# Patient Record
Sex: MALE | Race: BLACK OR AFRICAN AMERICAN | Employment: OTHER | URBAN - METROPOLITAN AREA
[De-identification: names, ages, dates, MRNs, and addresses within clinical notes are randomized per-mention and may not be internally consistent; named-entity substitution may affect disease eponyms.]

---

## 2017-01-03 ENCOUNTER — HOSPITAL ENCOUNTER (OUTPATIENT)
Dept: OTHER | Age: 52
Discharge: HOME | End: 2017-01-03
Attending: NURSE PRACTITIONER | Admitting: NURSE PRACTITIONER

## 2017-01-03 ENCOUNTER — OFFICE VISIT (OUTPATIENT)
Dept: FAMILY MEDICINE CLINIC | Age: 52
End: 2017-01-03

## 2017-01-03 VITALS
TEMPERATURE: 98.1 F | SYSTOLIC BLOOD PRESSURE: 120 MMHG | WEIGHT: 315 LBS | OXYGEN SATURATION: 96 % | HEART RATE: 98 BPM | BODY MASS INDEX: 49.44 KG/M2 | DIASTOLIC BLOOD PRESSURE: 82 MMHG | HEIGHT: 67 IN

## 2017-01-03 DIAGNOSIS — M79.89 FOOT SWELLING: Primary | ICD-10-CM

## 2017-01-03 DIAGNOSIS — M79.89 FOOT SWELLING: ICD-10-CM

## 2017-01-03 LAB
ALBUMIN SERPL-MCNC: 4.5 G/DL (ref 3.4–5)
ANION GAP SERPL CALCULATED.3IONS-SCNC: 15 MMOL/L (ref 3–16)
BASOPHILS ABSOLUTE: 0 K/UL (ref 0–0.2)
BASOPHILS RELATIVE PERCENT: 0.5 %
BUN BLDV-MCNC: 14 MG/DL (ref 7–20)
CALCIUM SERPL-MCNC: 9.5 MG/DL (ref 8.3–10.6)
CHLORIDE BLD-SCNC: 103 MMOL/L (ref 99–110)
CO2: 25 MMOL/L (ref 21–32)
CREAT SERPL-MCNC: 1.2 MG/DL (ref 0.9–1.3)
EOSINOPHILS ABSOLUTE: 0.1 K/UL (ref 0–0.6)
EOSINOPHILS RELATIVE PERCENT: 1.8 %
GFR AFRICAN AMERICAN: >60
GFR NON-AFRICAN AMERICAN: >60
GLUCOSE BLD-MCNC: 126 MG/DL (ref 70–99)
HCT VFR BLD CALC: 43.5 % (ref 40.5–52.5)
HEMOGLOBIN: 14.1 G/DL (ref 13.5–17.5)
LYMPHOCYTES ABSOLUTE: 1.9 K/UL (ref 1–5.1)
LYMPHOCYTES RELATIVE PERCENT: 28.3 %
MCH RBC QN AUTO: 28.9 PG (ref 26–34)
MCHC RBC AUTO-ENTMCNC: 32.5 G/DL (ref 31–36)
MCV RBC AUTO: 89.2 FL (ref 80–100)
MONOCYTES ABSOLUTE: 0.4 K/UL (ref 0–1.3)
MONOCYTES RELATIVE PERCENT: 6.1 %
NEUTROPHILS ABSOLUTE: 4.2 K/UL (ref 1.7–7.7)
NEUTROPHILS RELATIVE PERCENT: 63.3 %
PDW BLD-RTO: 15.3 % (ref 12.4–15.4)
PHOSPHORUS: 3.6 MG/DL (ref 2.5–4.9)
PLATELET # BLD: 293 K/UL (ref 135–450)
PMV BLD AUTO: 8 FL (ref 5–10.5)
POTASSIUM SERPL-SCNC: 5.1 MMOL/L (ref 3.5–5.1)
RBC # BLD: 4.88 M/UL (ref 4.2–5.9)
SODIUM BLD-SCNC: 143 MMOL/L (ref 136–145)
URIC ACID, SERUM: 9.5 MG/DL (ref 3.5–7.2)
WBC # BLD: 6.7 K/UL (ref 4–11)

## 2017-01-03 PROCEDURE — 99213 OFFICE O/P EST LOW 20 MIN: CPT | Performed by: NURSE PRACTITIONER

## 2017-01-03 RX ORDER — INDOMETHACIN 50 MG/1
50 CAPSULE ORAL 3 TIMES DAILY
Qty: 30 CAPSULE | Refills: 0 | Status: SHIPPED | OUTPATIENT
Start: 2017-01-03 | End: 2017-04-07 | Stop reason: SDUPTHER

## 2017-01-03 ASSESSMENT — ENCOUNTER SYMPTOMS
NAUSEA: 0
SORE THROAT: 0
ALLERGIC/IMMUNOLOGIC NEGATIVE: 1
VISUAL CHANGE: 0
CHANGE IN BOWEL HABIT: 0
SWOLLEN GLANDS: 0
VOMITING: 0
GASTROINTESTINAL NEGATIVE: 1
COUGH: 0
ABDOMINAL PAIN: 0
RESPIRATORY NEGATIVE: 1
EYES NEGATIVE: 1

## 2017-01-04 LAB — SEDIMENTATION RATE, ERYTHROCYTE: 18 MM/HR (ref 0–20)

## 2017-04-07 ENCOUNTER — OFFICE VISIT (OUTPATIENT)
Dept: FAMILY MEDICINE CLINIC | Age: 52
End: 2017-04-07

## 2017-04-07 ENCOUNTER — HOSPITAL ENCOUNTER (OUTPATIENT)
Dept: OTHER | Age: 52
Discharge: HOME | End: 2017-04-07
Attending: FAMILY MEDICINE | Admitting: FAMILY MEDICINE

## 2017-04-07 VITALS
TEMPERATURE: 98 F | SYSTOLIC BLOOD PRESSURE: 130 MMHG | BODY MASS INDEX: 49.44 KG/M2 | HEART RATE: 106 BPM | DIASTOLIC BLOOD PRESSURE: 86 MMHG | WEIGHT: 315 LBS | HEIGHT: 67 IN | OXYGEN SATURATION: 96 %

## 2017-04-07 DIAGNOSIS — R35.0 URINATION FREQUENCY: ICD-10-CM

## 2017-04-07 DIAGNOSIS — M79.89 FOOT SWELLING: ICD-10-CM

## 2017-04-07 DIAGNOSIS — Z11.4 SCREENING FOR HIV (HUMAN IMMUNODEFICIENCY VIRUS): ICD-10-CM

## 2017-04-07 DIAGNOSIS — E78.2 MIXED HYPERLIPIDEMIA: Chronic | ICD-10-CM

## 2017-04-07 DIAGNOSIS — Z11.59 NEED FOR HEPATITIS C SCREENING TEST: ICD-10-CM

## 2017-04-07 DIAGNOSIS — M10.00 IDIOPATHIC GOUT, UNSPECIFIED CHRONICITY, UNSPECIFIED SITE: ICD-10-CM

## 2017-04-07 DIAGNOSIS — M17.0 PRIMARY OSTEOARTHRITIS OF BOTH KNEES: ICD-10-CM

## 2017-04-07 DIAGNOSIS — R73.9 HYPERGLYCEMIA: ICD-10-CM

## 2017-04-07 DIAGNOSIS — Z12.5 SCREENING PSA (PROSTATE SPECIFIC ANTIGEN): ICD-10-CM

## 2017-04-07 DIAGNOSIS — E55.9 VITAMIN D DEFICIENCY: ICD-10-CM

## 2017-04-07 DIAGNOSIS — M17.0 PRIMARY OSTEOARTHRITIS OF BOTH KNEES: Primary | ICD-10-CM

## 2017-04-07 LAB
BILIRUBIN, POC: NORMAL
BLOOD URINE, POC: NORMAL
CLARITY, POC: CLEAR
COLOR, POC: YELLOW
GLUCOSE URINE, POC: NORMAL
KETONES, POC: NORMAL
LEUKOCYTE EST, POC: NORMAL
NITRITE, POC: NORMAL
PH, POC: 8
PROTEIN, POC: NORMAL
SPECIFIC GRAVITY, POC: 1.01
UROBILINOGEN, POC: 0.2

## 2017-04-07 PROCEDURE — 81002 URINALYSIS NONAUTO W/O SCOPE: CPT | Performed by: FAMILY MEDICINE

## 2017-04-07 PROCEDURE — 99214 OFFICE O/P EST MOD 30 MIN: CPT | Performed by: FAMILY MEDICINE

## 2017-04-07 RX ORDER — ALLOPURINOL 300 MG/1
300 TABLET ORAL DAILY
Qty: 90 TABLET | Refills: 1 | Status: SHIPPED | OUTPATIENT
Start: 2017-04-07 | End: 2018-06-28

## 2017-04-07 RX ORDER — ATORVASTATIN CALCIUM 10 MG/1
10 TABLET, FILM COATED ORAL NIGHTLY
Qty: 30 TABLET | Refills: 3 | Status: SHIPPED | OUTPATIENT
Start: 2017-04-07 | End: 2018-03-28

## 2017-04-07 RX ORDER — INDOMETHACIN 50 MG/1
50 CAPSULE ORAL 3 TIMES DAILY
Qty: 30 CAPSULE | Refills: 0 | Status: SHIPPED | OUTPATIENT
Start: 2017-04-07 | End: 2017-05-23 | Stop reason: ALTCHOICE

## 2017-04-08 DIAGNOSIS — Z11.59 NEED FOR HEPATITIS C SCREENING TEST: ICD-10-CM

## 2017-04-08 DIAGNOSIS — R73.9 HYPERGLYCEMIA: ICD-10-CM

## 2017-04-08 DIAGNOSIS — Z12.5 SCREENING PSA (PROSTATE SPECIFIC ANTIGEN): ICD-10-CM

## 2017-04-08 DIAGNOSIS — Z11.4 SCREENING FOR HIV (HUMAN IMMUNODEFICIENCY VIRUS): ICD-10-CM

## 2017-04-08 DIAGNOSIS — E78.2 MIXED HYPERLIPIDEMIA: Chronic | ICD-10-CM

## 2017-04-08 DIAGNOSIS — E55.9 VITAMIN D DEFICIENCY: ICD-10-CM

## 2017-04-08 DIAGNOSIS — M10.00 IDIOPATHIC GOUT, UNSPECIFIED CHRONICITY, UNSPECIFIED SITE: ICD-10-CM

## 2017-04-08 LAB
A/G RATIO: 1.7 (ref 1.1–2.2)
ALBUMIN SERPL-MCNC: 4.5 G/DL (ref 3.4–5)
ALP BLD-CCNC: 82 U/L (ref 40–129)
ALT SERPL-CCNC: 27 U/L (ref 10–40)
ANION GAP SERPL CALCULATED.3IONS-SCNC: 13 MMOL/L (ref 3–16)
AST SERPL-CCNC: 22 U/L (ref 15–37)
BILIRUB SERPL-MCNC: 0.5 MG/DL (ref 0–1)
BUN BLDV-MCNC: 12 MG/DL (ref 7–20)
CALCIUM SERPL-MCNC: 9.8 MG/DL (ref 8.3–10.6)
CHLORIDE BLD-SCNC: 105 MMOL/L (ref 99–110)
CHOLESTEROL, TOTAL: 250 MG/DL (ref 0–199)
CO2: 26 MMOL/L (ref 21–32)
CREAT SERPL-MCNC: 1.2 MG/DL (ref 0.9–1.3)
GFR AFRICAN AMERICAN: >60
GFR NON-AFRICAN AMERICAN: >60
GLOBULIN: 2.7 G/DL
GLUCOSE BLD-MCNC: 129 MG/DL (ref 70–99)
HDLC SERPL-MCNC: 49 MG/DL (ref 40–60)
HEPATITIS C ANTIBODY INTERPRETATION: NORMAL
LDL CHOLESTEROL CALCULATED: 175 MG/DL
POTASSIUM SERPL-SCNC: 4.6 MMOL/L (ref 3.5–5.1)
PROSTATE SPECIFIC ANTIGEN: 0.4 NG/ML (ref 0–4)
SODIUM BLD-SCNC: 144 MMOL/L (ref 136–145)
TOTAL PROTEIN: 7.2 G/DL (ref 6.4–8.2)
TRIGL SERPL-MCNC: 129 MG/DL (ref 0–150)
URIC ACID, SERUM: 7.9 MG/DL (ref 3.5–7.2)
VITAMIN D 25-HYDROXY: 20.3 NG/ML
VLDLC SERPL CALC-MCNC: 26 MG/DL

## 2017-04-09 LAB
ESTIMATED AVERAGE GLUCOSE: 142.7 MG/DL
HBA1C MFR BLD: 6.6 %

## 2017-04-09 ASSESSMENT — ENCOUNTER SYMPTOMS
GASTROINTESTINAL NEGATIVE: 1
RESPIRATORY NEGATIVE: 1
EYES NEGATIVE: 1

## 2017-04-10 LAB — HIV-1 AND HIV-2 ANTIBODIES: NORMAL

## 2017-04-11 DIAGNOSIS — E55.9 VITAMIN D DEFICIENCY: ICD-10-CM

## 2017-04-11 DIAGNOSIS — E11.9 CONTROLLED TYPE 2 DIABETES MELLITUS WITHOUT COMPLICATION, WITHOUT LONG-TERM CURRENT USE OF INSULIN (HCC): Primary | ICD-10-CM

## 2017-05-16 ENCOUNTER — TELEPHONE (OUTPATIENT)
Dept: FAMILY MEDICINE CLINIC | Age: 52
End: 2017-05-16

## 2017-05-16 DIAGNOSIS — M17.0 PRIMARY OSTEOARTHRITIS OF BOTH KNEES: Primary | ICD-10-CM

## 2017-05-23 ENCOUNTER — TELEPHONE (OUTPATIENT)
Dept: FAMILY MEDICINE CLINIC | Age: 52
End: 2017-05-23

## 2017-05-23 DIAGNOSIS — M17.0 PRIMARY OSTEOARTHRITIS OF BOTH KNEES: ICD-10-CM

## 2017-05-23 RX ORDER — DICLOFENAC SODIUM 75 MG/1
75 TABLET, DELAYED RELEASE ORAL 2 TIMES DAILY
Qty: 60 TABLET | Refills: 3 | Status: SHIPPED | OUTPATIENT
Start: 2017-05-23 | End: 2018-07-19

## 2017-05-24 ENCOUNTER — OFFICE VISIT (OUTPATIENT)
Dept: FAMILY MEDICINE CLINIC | Age: 52
End: 2017-05-24

## 2017-05-24 VITALS
HEIGHT: 68 IN | OXYGEN SATURATION: 95 % | BODY MASS INDEX: 47.74 KG/M2 | HEART RATE: 97 BPM | DIASTOLIC BLOOD PRESSURE: 90 MMHG | WEIGHT: 315 LBS | SYSTOLIC BLOOD PRESSURE: 124 MMHG | TEMPERATURE: 98.6 F

## 2017-05-24 DIAGNOSIS — E11.9 CONTROLLED TYPE 2 DIABETES MELLITUS WITHOUT COMPLICATION, WITHOUT LONG-TERM CURRENT USE OF INSULIN (HCC): Primary | ICD-10-CM

## 2017-05-24 DIAGNOSIS — E55.9 VITAMIN D DEFICIENCY: ICD-10-CM

## 2017-05-24 DIAGNOSIS — E78.2 MIXED HYPERLIPIDEMIA: Chronic | ICD-10-CM

## 2017-05-24 DIAGNOSIS — E11.9 CONTROLLED TYPE 2 DIABETES MELLITUS WITHOUT COMPLICATION, WITHOUT LONG-TERM CURRENT USE OF INSULIN (HCC): ICD-10-CM

## 2017-05-24 LAB
ALBUMIN SERPL-MCNC: 4.4 G/DL (ref 3.4–5)
ANION GAP SERPL CALCULATED.3IONS-SCNC: 17 MMOL/L (ref 3–16)
BILIRUBIN, POC: NORMAL
BLOOD URINE, POC: NORMAL
BUN BLDV-MCNC: 13 MG/DL (ref 7–20)
CALCIUM SERPL-MCNC: 9.7 MG/DL (ref 8.3–10.6)
CHLORIDE BLD-SCNC: 102 MMOL/L (ref 99–110)
CHOLESTEROL, TOTAL: 198 MG/DL (ref 0–199)
CLARITY, POC: CLEAR
CO2: 21 MMOL/L (ref 21–32)
COLOR, POC: YELLOW
CREAT SERPL-MCNC: 1 MG/DL (ref 0.9–1.3)
CREATININE URINE POCT: NORMAL
GFR AFRICAN AMERICAN: >60
GFR NON-AFRICAN AMERICAN: >60
GLUCOSE BLD-MCNC: 88 MG/DL (ref 70–99)
GLUCOSE URINE, POC: NORMAL
HDLC SERPL-MCNC: 52 MG/DL (ref 40–60)
KETONES, POC: NORMAL
LDL CHOLESTEROL CALCULATED: 123 MG/DL
LEUKOCYTE EST, POC: NORMAL
MICROALBUMIN/CREAT 24H UR: NORMAL MG/G{CREAT}
MICROALBUMIN/CREAT UR-RTO: NORMAL
NITRITE, POC: NORMAL
PH, POC: 7
PHOSPHORUS: 3.2 MG/DL (ref 2.5–4.9)
POTASSIUM SERPL-SCNC: 5.1 MMOL/L (ref 3.5–5.1)
PROTEIN, POC: NORMAL
SODIUM BLD-SCNC: 140 MMOL/L (ref 136–145)
SPECIFIC GRAVITY, POC: 1.01
TRIGL SERPL-MCNC: 115 MG/DL (ref 0–150)
UROBILINOGEN, POC: 0.2
VITAMIN D 25-HYDROXY: 31.9 NG/ML
VLDLC SERPL CALC-MCNC: 23 MG/DL

## 2017-05-24 PROCEDURE — 82044 UR ALBUMIN SEMIQUANTITATIVE: CPT | Performed by: NURSE PRACTITIONER

## 2017-05-24 PROCEDURE — 99214 OFFICE O/P EST MOD 30 MIN: CPT | Performed by: NURSE PRACTITIONER

## 2017-05-24 PROCEDURE — 81002 URINALYSIS NONAUTO W/O SCOPE: CPT | Performed by: NURSE PRACTITIONER

## 2017-05-24 ASSESSMENT — ENCOUNTER SYMPTOMS
SHORTNESS OF BREATH: 0
GASTROINTESTINAL NEGATIVE: 1
EYES NEGATIVE: 1
RESPIRATORY NEGATIVE: 1
ALLERGIC/IMMUNOLOGIC NEGATIVE: 1

## 2017-05-25 LAB
ESTIMATED AVERAGE GLUCOSE: 134.1 MG/DL
HBA1C MFR BLD: 6.3 %

## 2017-06-05 ENCOUNTER — HOSPITAL ENCOUNTER (OUTPATIENT)
Dept: PHYSICAL THERAPY | Facility: MEDICAL CENTER | Age: 52
Discharge: HOME | End: 2017-06-30
Admitting: FAMILY MEDICINE

## 2017-06-05 ENCOUNTER — TELEPHONE (OUTPATIENT)
Dept: FAMILY MEDICINE CLINIC | Age: 52
End: 2017-06-05

## 2017-06-05 DIAGNOSIS — E11.9 CONTROLLED TYPE 2 DIABETES MELLITUS WITHOUT COMPLICATION, WITHOUT LONG-TERM CURRENT USE OF INSULIN (HCC): Primary | ICD-10-CM

## 2017-06-28 ENCOUNTER — TELEPHONE (OUTPATIENT)
Dept: FAMILY MEDICINE CLINIC | Age: 52
End: 2017-06-28

## 2017-06-28 DIAGNOSIS — E11.9 CONTROLLED TYPE 2 DIABETES MELLITUS WITHOUT COMPLICATION, WITHOUT LONG-TERM CURRENT USE OF INSULIN (HCC): ICD-10-CM

## 2017-06-28 DIAGNOSIS — M17.0 PRIMARY OSTEOARTHRITIS OF BOTH KNEES: Primary | ICD-10-CM

## 2017-07-10 ENCOUNTER — OFFICE VISIT (OUTPATIENT)
Dept: ORTHOPEDIC SURGERY | Age: 52
End: 2017-07-10

## 2017-07-10 VITALS — WEIGHT: 315 LBS | BODY MASS INDEX: 47.74 KG/M2 | HEIGHT: 68 IN

## 2017-07-10 DIAGNOSIS — M25.461 EFFUSION OF RIGHT KNEE: ICD-10-CM

## 2017-07-10 DIAGNOSIS — M25.561 RIGHT KNEE PAIN, UNSPECIFIED CHRONICITY: Primary | ICD-10-CM

## 2017-07-10 PROCEDURE — 99243 OFF/OP CNSLTJ NEW/EST LOW 30: CPT | Performed by: ORTHOPAEDIC SURGERY

## 2017-07-10 PROCEDURE — 73564 X-RAY EXAM KNEE 4 OR MORE: CPT | Performed by: ORTHOPAEDIC SURGERY

## 2017-07-10 PROCEDURE — 20610 DRAIN/INJ JOINT/BURSA W/O US: CPT | Performed by: ORTHOPAEDIC SURGERY

## 2017-08-15 ENCOUNTER — OFFICE VISIT (OUTPATIENT)
Dept: ORTHOPEDIC SURGERY | Age: 52
End: 2017-08-15

## 2017-08-15 VITALS — BODY MASS INDEX: 46.65 KG/M2 | HEIGHT: 69 IN | WEIGHT: 315 LBS

## 2017-08-15 DIAGNOSIS — S83.231D COMPLEX TEAR OF MEDIAL MENISCUS OF RIGHT KNEE AS CURRENT INJURY, SUBSEQUENT ENCOUNTER: Primary | ICD-10-CM

## 2017-08-15 PROCEDURE — 99213 OFFICE O/P EST LOW 20 MIN: CPT | Performed by: ORTHOPAEDIC SURGERY

## 2017-08-25 ENCOUNTER — OFFICE VISIT (OUTPATIENT)
Dept: ORTHOPEDIC SURGERY | Age: 52
End: 2017-08-25

## 2017-08-25 VITALS — WEIGHT: 315 LBS | HEIGHT: 69 IN | BODY MASS INDEX: 46.65 KG/M2

## 2017-08-25 DIAGNOSIS — S83.271D COMPLEX TEAR OF LATERAL MENISCUS OF RIGHT KNEE AS CURRENT INJURY, SUBSEQUENT ENCOUNTER: ICD-10-CM

## 2017-08-25 DIAGNOSIS — S83.231D COMPLEX TEAR OF MEDIAL MENISCUS OF RIGHT KNEE AS CURRENT INJURY, SUBSEQUENT ENCOUNTER: Primary | ICD-10-CM

## 2017-08-25 PROBLEM — S83.271A COMPLEX TEAR OF LATERAL MENISCUS OF RIGHT KNEE AS CURRENT INJURY: Status: ACTIVE | Noted: 2017-08-25

## 2017-08-25 PROBLEM — S83.231A COMPLEX TEAR OF MEDIAL MENISCUS OF RIGHT KNEE AS CURRENT INJURY: Status: ACTIVE | Noted: 2017-08-25

## 2017-08-25 PROCEDURE — 99213 OFFICE O/P EST LOW 20 MIN: CPT | Performed by: ORTHOPAEDIC SURGERY

## 2017-08-28 ENCOUNTER — TELEPHONE (OUTPATIENT)
Dept: ORTHOPEDIC SURGERY | Age: 52
End: 2017-08-28

## 2017-09-01 ENCOUNTER — TELEPHONE (OUTPATIENT)
Dept: ORTHOPEDIC SURGERY | Age: 52
End: 2017-09-01

## 2017-09-01 ENCOUNTER — OFFICE VISIT (OUTPATIENT)
Dept: FAMILY MEDICINE CLINIC | Age: 52
End: 2017-09-01

## 2017-09-01 VITALS
HEART RATE: 105 BPM | BODY MASS INDEX: 46.65 KG/M2 | HEIGHT: 69 IN | SYSTOLIC BLOOD PRESSURE: 126 MMHG | OXYGEN SATURATION: 98 % | TEMPERATURE: 98.1 F | WEIGHT: 315 LBS | DIASTOLIC BLOOD PRESSURE: 86 MMHG

## 2017-09-01 DIAGNOSIS — S83.271S COMPLEX TEAR OF LATERAL MENISCUS OF RIGHT KNEE AS CURRENT INJURY, SEQUELA: ICD-10-CM

## 2017-09-01 DIAGNOSIS — G47.33 OBSTRUCTIVE SLEEP APNEA SYNDROME: Chronic | ICD-10-CM

## 2017-09-01 DIAGNOSIS — Z20.2 EXPOSURE TO TRICHOMONAS: ICD-10-CM

## 2017-09-01 DIAGNOSIS — M10.00 IDIOPATHIC GOUT, UNSPECIFIED CHRONICITY, UNSPECIFIED SITE: ICD-10-CM

## 2017-09-01 DIAGNOSIS — E66.01 MORBID OBESITY WITH BMI OF 45.0-49.9, ADULT (HCC): ICD-10-CM

## 2017-09-01 DIAGNOSIS — N18.2 CHRONIC KIDNEY DISEASE (CKD), STAGE II (MILD): Chronic | ICD-10-CM

## 2017-09-01 DIAGNOSIS — M17.0 PRIMARY OSTEOARTHRITIS OF BOTH KNEES: ICD-10-CM

## 2017-09-01 DIAGNOSIS — E78.2 MIXED HYPERLIPIDEMIA: Chronic | ICD-10-CM

## 2017-09-01 DIAGNOSIS — E11.9 CONTROLLED TYPE 2 DIABETES MELLITUS WITHOUT COMPLICATION, WITHOUT LONG-TERM CURRENT USE OF INSULIN (HCC): ICD-10-CM

## 2017-09-01 DIAGNOSIS — S83.231S COMPLEX TEAR OF MEDIAL MENISCUS OF RIGHT KNEE AS CURRENT INJURY, SEQUELA: ICD-10-CM

## 2017-09-01 DIAGNOSIS — Z01.818 PREOP EXAMINATION: Primary | ICD-10-CM

## 2017-09-01 PROCEDURE — 93000 ELECTROCARDIOGRAM COMPLETE: CPT | Performed by: NURSE PRACTITIONER

## 2017-09-01 PROCEDURE — 99244 OFF/OP CNSLTJ NEW/EST MOD 40: CPT | Performed by: NURSE PRACTITIONER

## 2017-09-04 LAB
APTIMA MEDIA TYPE: NORMAL
SPECIMEN SOURCE: NORMAL
T. VAGINALIS AMPLIFIED: NEGATIVE

## 2017-09-14 ENCOUNTER — HOSPITAL ENCOUNTER (OUTPATIENT)
Dept: SURGERY | Age: 52
Discharge: HOME | End: 2017-09-14
Attending: ORTHOPAEDIC SURGERY | Admitting: ORTHOPAEDIC SURGERY

## 2017-09-14 VITALS
WEIGHT: 315 LBS | BODY MASS INDEX: 47.74 KG/M2 | SYSTOLIC BLOOD PRESSURE: 119 MMHG | HEART RATE: 91 BPM | DIASTOLIC BLOOD PRESSURE: 91 MMHG | HEIGHT: 68 IN | OXYGEN SATURATION: 98 % | RESPIRATION RATE: 16 BRPM | TEMPERATURE: 97 F

## 2017-09-14 LAB
GLUCOSE BLD-MCNC: 127 MG/DL (ref 70–99)
GLUCOSE BLD-MCNC: 90 MG/DL (ref 70–99)
HCT VFR BLD CALC: 45.5 % (ref 40.5–52.5)
HEMOGLOBIN: 14.3 G/DL (ref 13.5–17.5)
MCH RBC QN AUTO: 28.4 PG (ref 26–34)
MCHC RBC AUTO-ENTMCNC: 31.4 G/DL (ref 31–36)
MCV RBC AUTO: 90.3 FL (ref 80–100)
PDW BLD-RTO: 15.1 % (ref 12.4–15.4)
PERFORMED ON: ABNORMAL
PERFORMED ON: NORMAL
PLATELET # BLD: 252 K/UL (ref 135–450)
PMV BLD AUTO: 8 FL (ref 5–10.5)
RBC # BLD: 5.04 M/UL (ref 4.2–5.9)
WBC # BLD: 7.5 K/UL (ref 4–11)

## 2017-09-14 RX ORDER — IBUPROFEN 800 MG/1
800 TABLET ORAL EVERY 6 HOURS PRN
Qty: 60 TABLET | Refills: 3 | Status: SHIPPED | OUTPATIENT
Start: 2017-09-14 | End: 2018-07-19

## 2017-09-14 RX ORDER — HYDROMORPHONE HCL 110MG/55ML
0.5 PATIENT CONTROLLED ANALGESIA SYRINGE INTRAVENOUS EVERY 5 MIN PRN
Status: DISCONTINUED | OUTPATIENT
Start: 2017-09-14 | End: 2017-09-15 | Stop reason: HOSPADM

## 2017-09-14 RX ORDER — SODIUM CHLORIDE, SODIUM LACTATE, POTASSIUM CHLORIDE, CALCIUM CHLORIDE 600; 310; 30; 20 MG/100ML; MG/100ML; MG/100ML; MG/100ML
INJECTION, SOLUTION INTRAVENOUS CONTINUOUS
Status: DISCONTINUED | OUTPATIENT
Start: 2017-09-14 | End: 2017-09-15 | Stop reason: HOSPADM

## 2017-09-14 RX ORDER — SODIUM CHLORIDE 9 MG/ML
INJECTION, SOLUTION INTRAVENOUS CONTINUOUS
Status: DISCONTINUED | OUTPATIENT
Start: 2017-09-14 | End: 2017-09-15 | Stop reason: HOSPADM

## 2017-09-14 RX ORDER — SODIUM CHLORIDE 0.9 % (FLUSH) 0.9 %
10 SYRINGE (ML) INJECTION PRN
Status: DISCONTINUED | OUTPATIENT
Start: 2017-09-14 | End: 2017-09-15 | Stop reason: HOSPADM

## 2017-09-14 RX ORDER — DIPHENHYDRAMINE HYDROCHLORIDE 50 MG/ML
25 INJECTION INTRAMUSCULAR; INTRAVENOUS PRN
Status: DISCONTINUED | OUTPATIENT
Start: 2017-09-14 | End: 2017-09-15 | Stop reason: HOSPADM

## 2017-09-14 RX ORDER — OXYCODONE HYDROCHLORIDE 5 MG/1
5 TABLET ORAL EVERY 4 HOURS PRN
Qty: 20 TABLET | Refills: 0 | Status: SHIPPED | OUTPATIENT
Start: 2017-09-14 | End: 2017-09-21

## 2017-09-14 RX ORDER — SODIUM CHLORIDE 0.9 % (FLUSH) 0.9 %
10 SYRINGE (ML) INJECTION EVERY 12 HOURS SCHEDULED
Status: DISCONTINUED | OUTPATIENT
Start: 2017-09-14 | End: 2017-09-15 | Stop reason: HOSPADM

## 2017-09-14 RX ORDER — OXYCODONE HYDROCHLORIDE 5 MG/1
TABLET ORAL
Status: DISPENSED
Start: 2017-09-14 | End: 2017-09-14

## 2017-09-14 RX ORDER — LIDOCAINE HYDROCHLORIDE 10 MG/ML
0.5 INJECTION, SOLUTION EPIDURAL; INFILTRATION; INTRACAUDAL; PERINEURAL ONCE
Status: DISCONTINUED | OUTPATIENT
Start: 2017-09-14 | End: 2017-09-15 | Stop reason: HOSPADM

## 2017-09-14 RX ORDER — MORPHINE SULFATE 2 MG/ML
1 INJECTION, SOLUTION INTRAMUSCULAR; INTRAVENOUS EVERY 5 MIN PRN
Status: DISCONTINUED | OUTPATIENT
Start: 2017-09-14 | End: 2017-09-15 | Stop reason: HOSPADM

## 2017-09-14 RX ORDER — FENTANYL CITRATE 50 UG/ML
25 INJECTION, SOLUTION INTRAMUSCULAR; INTRAVENOUS EVERY 5 MIN PRN
Status: DISCONTINUED | OUTPATIENT
Start: 2017-09-14 | End: 2017-09-15 | Stop reason: HOSPADM

## 2017-09-14 RX ORDER — OXYCODONE HYDROCHLORIDE 5 MG/1
5 TABLET ORAL
Status: COMPLETED | OUTPATIENT
Start: 2017-09-14 | End: 2017-09-14

## 2017-09-14 RX ORDER — ACETAMINOPHEN 500 MG
1000 TABLET ORAL EVERY 6 HOURS PRN
Qty: 60 TABLET | Refills: 3 | Status: SHIPPED | OUTPATIENT
Start: 2017-09-14 | End: 2018-07-12

## 2017-09-14 RX ORDER — ONDANSETRON 2 MG/ML
4 INJECTION INTRAMUSCULAR; INTRAVENOUS
Status: ACTIVE | OUTPATIENT
Start: 2017-09-14 | End: 2017-09-14

## 2017-09-14 RX ADMIN — FENTANYL CITRATE 25 MCG: 50 INJECTION, SOLUTION INTRAMUSCULAR; INTRAVENOUS at 08:11

## 2017-09-14 RX ADMIN — OXYCODONE HYDROCHLORIDE 5 MG: 5 TABLET ORAL at 08:35

## 2017-09-14 RX ADMIN — FENTANYL CITRATE 25 MCG: 50 INJECTION, SOLUTION INTRAMUSCULAR; INTRAVENOUS at 08:27

## 2017-09-14 RX ADMIN — SODIUM CHLORIDE: 9 INJECTION, SOLUTION INTRAVENOUS at 06:46

## 2017-09-14 ASSESSMENT — PAIN SCALES - GENERAL
PAINLEVEL_OUTOF10: 4
PAINLEVEL_OUTOF10: 8
PAINLEVEL_OUTOF10: 7

## 2017-09-14 ASSESSMENT — PAIN - FUNCTIONAL ASSESSMENT: PAIN_FUNCTIONAL_ASSESSMENT: 0-10

## 2017-09-14 ASSESSMENT — PAIN DESCRIPTION - DESCRIPTORS: DESCRIPTORS: ACHING

## 2017-09-18 DIAGNOSIS — S83.231D COMPLEX TEAR OF MEDIAL MENISCUS OF RIGHT KNEE AS CURRENT INJURY, SUBSEQUENT ENCOUNTER: Primary | ICD-10-CM

## 2017-09-18 DIAGNOSIS — M94.261 CHONDROMALACIA OF MEDIAL FEMORAL CONDYLE, RIGHT: ICD-10-CM

## 2017-09-18 DIAGNOSIS — M22.41 CHONDROMALACIA OF PATELLOFEMORAL JOINT, RIGHT: ICD-10-CM

## 2017-09-18 DIAGNOSIS — M67.261 SYNOVIAL HYPERTROPHY OF RIGHT KNEE: ICD-10-CM

## 2017-09-18 DIAGNOSIS — S83.271D COMPLEX TEAR OF LATERAL MENISCUS OF RIGHT KNEE AS CURRENT INJURY, SUBSEQUENT ENCOUNTER: ICD-10-CM

## 2017-09-19 ENCOUNTER — OFFICE VISIT (OUTPATIENT)
Dept: ORTHOPEDIC SURGERY | Age: 52
End: 2017-09-19

## 2017-09-19 DIAGNOSIS — M17.11 PRIMARY OSTEOARTHRITIS OF RIGHT KNEE: ICD-10-CM

## 2017-09-19 DIAGNOSIS — S83.231D COMPLEX TEAR OF MEDIAL MENISCUS OF RIGHT KNEE AS CURRENT INJURY, SUBSEQUENT ENCOUNTER: Primary | ICD-10-CM

## 2017-09-19 PROCEDURE — 99024 POSTOP FOLLOW-UP VISIT: CPT | Performed by: ORTHOPAEDIC SURGERY

## 2017-09-22 ENCOUNTER — HOSPITAL ENCOUNTER (OUTPATIENT)
Dept: PHYSICAL THERAPY | Age: 52
Discharge: HOME | End: 2017-09-30
Admitting: ORTHOPAEDIC SURGERY

## 2017-09-22 NOTE — FLOWSHEET NOTE
[facility]    Physical Therapy Daily Treatment Note  Date:  2017    Patient Name:  Ekaterina Rodriguez    :  1965  MRN: [id number]  Restrictions/Precautions:    Medical/Treatment Diagnosis Information:  · Diagnosis: S83.231D (ICD-10-CM) - Complex tear of medial meniscus of right knee, S83.271D (ICD-10-CM) - Complex tear of lateral meniscus of right knee  · Treatment Diagnosis: Right Knee Pain  Insurance/Certification information:  PT Insurance Information: [employer]  Physician Information:  Referring Practitioner: Dr. Evy Jones of care signed (Y/N):     Date of Patient follow up with Physician:     G-Code (if applicable):      Date G-Code Applied:         Progress Note: [x]  Yes  []  No  Next due by: Visit #10       Latex Allergy:  [x]NO      []YES  Preferred Language for Healthcare:   [x]English       []other:    Visit # Insurance Allowable   1 30     Pain level:  10/10     SUBJECTIVE:  See eval    OBJECTIVE: See eval  Observation:   Test measurements:      RESTRICTIONS/PRECAUTIONS: OA, Anxiety, Depression    Exercises/Interventions:     DOS-17        Script-11/3/17  Stretching Reps Notes/Last Progression   Bike      Airdyne     Eliptical     Gastroc Stretch  4x30\"    Hamstring Stretch: Tableside 4x30\"    Piriformis Cross Over     Figure 4 Piriformis     Supine ITB      SKC     DKC     Adductor Stretch     Heel Prop/ Prone Hang     Wallslide     Heel Slides/ DKTC with SB     BKFO                   Exercises     Add Iso     Quad Sets 15x5\"    SAQ 2x10    SLR Flex 2x10    SLR ABD     SLR Ext     Clamshells     Prone Donkey Kick     Bridge     Ankle Theraband     BAPS     HR/TR     Squats     Lateral Walk     Single Leg Balance     EOT Hip Ext/ Donkey Kick                  Manual      Hip Mobs with Belt     Knee Mobs/PROM Grade-     Patellar Mobs     Ankle Mobs/PROM Grade-         Therapeutic Exercise and NMR EXR  [] (52705) Provided verbal/tactile cueing for activities related to strengthening, flexibility, endurance, ROM for improvements in LE, proximal hip, and core control with self care, mobility, lifting, ambulation.  [] (58658) Provided verbal/tactile cueing for activities related to improving balance, coordination, kinesthetic sense, posture, motor skill, proprioception  to assist with LE, proximal hip, and core control in self care, mobility, lifting, ambulation and eccentric single leg control.      NMR and Therapeutic Activities:    [] (97426 or 39237) Provided verbal/tactile cueing for activities related to improving balance, coordination, kinesthetic sense, posture, motor skill, proprioception and motor activation to allow for proper function of core, proximal hip and LE with self care and ADLs  [] (07582) Gait Re-education- Provided training and instruction to the patient for proper LE, core and proximal hip recruitment and positioning and eccentric body weight control with ambulation re-education including up and down stairs     Home Exercise Program:    [x] (59162) Reviewed/Progressed HEP activities related to strengthening, flexibility, endurance, ROM of core, proximal hip and LE for functional self-care, mobility, lifting and ambulation/stair navigation   [] (29462)Reviewed/Progressed HEP activities related to improving balance, coordination, kinesthetic sense, posture, motor skill, proprioception of core, proximal hip and LE for self care, mobility, lifting, and ambulation/stair navigation      Manual Treatments:  PROM / STM / Oscillations-Mobs:  G-I, II, III, IV (PA's, Inf., Post.)  [] (26982) Provided manual therapy to mobilize LE, proximal hip and/or LS spine soft tissue/joints for the purpose of modulating pain, promoting relaxation,  increasing ROM, reducing/eliminating soft tissue swelling/inflammation/restriction, improving soft tissue extensibility and allowing for proper ROM for normal function with self care, mobility, lifting and ambulation. Modalities:  CP x15'    Charges:  Timed Code Treatment Minutes: 20   Total Treatment Minutes: 50     [x] EVAL (LOW) 73541 (typically 20 minutes face-to-face)  [] EVAL (MOD) 23955 (typically 30 minutes face-to-face)  [] EVAL (HIGH) 30924 (typically 45 minutes face-to-face)  [] RE-EVAL     [x] CQ(14321) x  1   [] IONTO  [] NMR (84202) x      [] VASO  [] Manual (94017) x       [] Other:  [] TA x       [] Mech Traction (69142)  [] ES(attended) (04564)      [] ES (un) (21791):     GOALS:  1. Independent in HEP and progression per patient tolerance, in order to prevent re-injury. 2. Patient will have a decrease in pain to facilitate improvement in movement, function, and ADLs as indicated by Functional Deficits. Long Term Goals: To be achieved in: 6 weeks  1. Disability index score of 20% or less for the LEFS to assist with reaching prior level of function. 2. Patient will demonstrate increased AROM to 0-120 knee flex and ext to allow for proper joint functioning as indicated by patients Functional Deficits. 3. Patient will demonstrate an increase in Strength to * to allow for proper functional mobility as indicated by patients Functional Deficits. 4. Patient will return to normal stair ambulation without increased symptoms or restriction. 5. Patient will be able to walk all ADL distances with LRAD. Progression Towards Functional goals:  [] Patient is progressing as expected towards functional goals listed. [] Progression is slowed due to complexities listed. [] Progression has been slowed due to co-morbidities.   [x] Plan just implemented, too soon to assess goals progression  [] Other:     ASSESSMENT:  See eval    Treatment/Activity Tolerance:  [x] Patient tolerated treatment well [] Patient limited by fatique  [] Patient limited by pain  [] Patient limited by other medical complications  [] Other:     Prognosis: [] Good [x] Fair  [] Poor    Patient Requires Follow-up: [x] Yes  []

## 2017-09-22 NOTE — PLAN OF CARE
All weightbearing, sleeping, Sit to stand, walking     Type: [x]Constant   []Intermittent  []Radiating []Localized []other:     Numbness/Tingling:     Functional Limitations/Impairments: [x]Sitting [x]Standing [x]Walking    [x]Squatting/bending  [x]Stairs           [x]ADL's  [x]Transfers []Sports/Recreation []Other:    Occupation/School:Unemplyed. Living Status/Prior Level of Function: Independent with ADLs and IADLs, prior to the last year.   (insert highest prior level of function)    OBJECTIVE:     ROM LEFT RIGHT   HIP Flex     HIP Abd     HIP Ext     HIP IR     HIP ER     Knee ext 6 12   Knee Flex 107 60   Ankle PF     Ankle DF     Ankle In     Ankle Ev     Strength  LEFT RIGHT   HIP Flexors     HIP Abductors     HIP Ext     Hip ER     Knee EXT (quad) Good Fair +   Knee Flex (HS)     Ankle DF     Ankle PF     Ankle Inv     Ankle EV          Circumference  Mid  7 cm       LE Dermatomes     LE myotomes       Single Leg Squat:    Joint mobility:    []Normal    [x]Hypo   []Hyper    Palpation: TTP lateral knee    Functional Mobility/Transfers: Min A with step onto table, slow and labored with table mobility and sit to stand    Posture: Obese    Bandages/Dressings/Incisions: Fixated by steri-strip    Gait: (include devices/WB status) antalgic with standard walking. Orthopedic Special Tests: Low Risk of DVT. [x] Patient history, allergies, meds reviewed. Medical chart reviewed. See intake form. Review Of Systems (ROS):  [x]Performed Review of systems (Integumentary, CardioPulmonary, Neurological) by intake and observation. Intake form has been scanned into medical record. Patient has been instructed to contact their primary care physician regarding ROS issues if not already being addressed at this time.         Co-morbidities/Complexities (which will affect course of rehabilitation):   []None           Arthritic conditions   []Rheumatoid arthritis (M05.9)  [x]Osteoarthritis (M19.91) consistent with tendinitis/tendinosis    []signs/symptoms consistent with pathology which may benefit from Dry needling      []other:      Prognosis/Rehab Potential:      []Excellent   []Good    [x]Fair   []Poor    Tolerance of evaluation/treatment:    []Excellent   [x]Good    []Fair   []Poor    Physical Therapy Evaluation Complexity Justification  [x] A history of present problem with:  [] no personal factors and/or comorbidities that impact the plan of care;  [x]1-2 personal factors and/or comorbidities that impact the plan of care  []3 personal factors and/or comorbidities that impact the plan of care  [x] An examination of body systems using standardized tests and measures addressing any of the following: body structures and functions (impairments), activity limitations, and/or participation restrictions;:  [] a total of 1-2 or more elements   [x] a total of 3 or more elements   [] a total of 4 or more elements   [x] A clinical presentation with:  [x] stable and/or uncomplicated characteristics   [] evolving clinical presentation with changing characteristics  [] unstable and unpredictable characteristics;   [x] Clinical decision making of [x] low, [] moderate, [] high complexity using standardized patient assessment instrument and/or measurable assessment of functional outcome. [x] EVAL (LOW) 55700 (typically 20 minutes face-to-face)  [] EVAL (MOD) 54591 (typically 30 minutes face-to-face)  [] EVAL (HIGH) 02533 (typically 45 minutes face-to-face)  [] RE-EVAL     PLAN  Frequency/Duration:  2 days per week for 6 Weeks:  Interventions:  1. Therapeutic exercise including: strength training, ROM/flexibility, NMR and proprioception for the proximal hip, core and Lower extremity  2. Manual therapy as indicated including Dry Needling/IASTM, STM, PROM, Gr I-IV mobilizations, spinal mobilization/manipulation. 3. Modalities as needed including: thermal agents, E-stim, US, iontophoresis as indicated.    4. Patient

## 2017-09-27 ENCOUNTER — HOSPITAL ENCOUNTER (OUTPATIENT)
Dept: PHYSICAL THERAPY | Age: 52
Discharge: HOME | End: 2017-09-28
Admitting: ORTHOPAEDIC SURGERY

## 2017-10-04 ENCOUNTER — HOSPITAL ENCOUNTER (OUTPATIENT)
Dept: PHYSICAL THERAPY | Age: 52
Discharge: HOME | End: 2017-10-05
Admitting: ORTHOPAEDIC SURGERY

## 2017-10-06 ENCOUNTER — HOSPITAL ENCOUNTER (OUTPATIENT)
Dept: PHYSICAL THERAPY | Age: 52
Discharge: HOME | End: 2017-10-07
Admitting: ORTHOPAEDIC SURGERY

## 2017-10-09 ENCOUNTER — HOSPITAL ENCOUNTER (OUTPATIENT)
Dept: PHYSICAL THERAPY | Age: 52
Discharge: HOME | End: 2017-10-10
Admitting: ORTHOPAEDIC SURGERY

## 2017-10-17 ENCOUNTER — OFFICE VISIT (OUTPATIENT)
Dept: ORTHOPEDIC SURGERY | Age: 52
End: 2017-10-17

## 2017-10-17 DIAGNOSIS — S83.231D COMPLEX TEAR OF MEDIAL MENISCUS OF RIGHT KNEE AS CURRENT INJURY, SUBSEQUENT ENCOUNTER: Primary | ICD-10-CM

## 2017-10-17 DIAGNOSIS — S83.271D COMPLEX TEAR OF LATERAL MENISCUS OF RIGHT KNEE AS CURRENT INJURY, SUBSEQUENT ENCOUNTER: ICD-10-CM

## 2017-10-17 PROCEDURE — 99024 POSTOP FOLLOW-UP VISIT: CPT | Performed by: ORTHOPAEDIC SURGERY

## 2017-10-17 NOTE — PROGRESS NOTES
Chief Complaint    Pain (right knee)      History of Present Illness:  Joseph Cueva is a 47 y.o. male. Follow-up for his right knee. Status post right knee arthroscopy with partial medial and lateral meniscectomy. Doing well at this time. Has made progress of physical therapy. States he's having much less pain within the right knee. He does still get some swelling within his right knee           Medical History:  Patient's medications, allergies, past medical, surgical, social and family histories were reviewed and updated as appropriate. Review of Systems:  Pertinent items are noted in HPI  Review of systems reviewed from Patient History Form dated on 10/17/17 and available in the patient's chart under the Media tab. Vital Signs: There were no vitals taken for this visit. General Exam:   Constitutional: Patient is adequately groomed with no evidence of malnutrition  DTRs: Deep tendon reflexes are intact  Mental Status: The patient is oriented to time, place and person. The patient's mood and affect are appropriate. Knee Examination:    Inspection:  Portal sites are well-healed. No erythema or drainage    Palpation:  Mild palpable effusion within the right knee. There is some mild tenderness along the medial joint line    Range of Motion:  Extension of the right knee is to 0°, knee flexion is 100°    Strength:  He is able to do straight leg raise    Special Tests:  Negative Homans sign. No instability varus valgus stress testing    Skin: There are no rashes, ulcerations or lesions. Gait: He is walking with a normal gait      Additional Comments:       Additional Examinations:         Lower Back: Examination of the lower back does not show any tenderness, deformity or injury. Range of motion is unremarkable. There is no gross instability. There are no rashes, ulcerations or lesions.   Strength and tone are normal.           Assessment :  Status post right knee arthroscopy with

## 2017-11-01 ENCOUNTER — HOSPITAL ENCOUNTER (OUTPATIENT)
Dept: PHYSICAL THERAPY | Age: 52
Discharge: HOME | End: 2017-11-30
Attending: ORTHOPAEDIC SURGERY | Admitting: ORTHOPAEDIC SURGERY

## 2018-01-23 ENCOUNTER — OFFICE VISIT (OUTPATIENT)
Dept: FAMILY MEDICINE CLINIC | Age: 53
End: 2018-01-23

## 2018-01-23 VITALS
TEMPERATURE: 98.1 F | WEIGHT: 315 LBS | DIASTOLIC BLOOD PRESSURE: 86 MMHG | SYSTOLIC BLOOD PRESSURE: 134 MMHG | BODY MASS INDEX: 49.93 KG/M2

## 2018-01-23 DIAGNOSIS — E11.9 CONTROLLED TYPE 2 DIABETES MELLITUS WITHOUT COMPLICATION, WITHOUT LONG-TERM CURRENT USE OF INSULIN (HCC): ICD-10-CM

## 2018-01-23 DIAGNOSIS — M10.09 IDIOPATHIC GOUT OF MULTIPLE SITES, UNSPECIFIED CHRONICITY: ICD-10-CM

## 2018-01-23 DIAGNOSIS — H61.23 BILATERAL IMPACTED CERUMEN: ICD-10-CM

## 2018-01-23 DIAGNOSIS — H60.312 ACUTE DIFFUSE OTITIS EXTERNA OF LEFT EAR: ICD-10-CM

## 2018-01-23 DIAGNOSIS — H90.0 CONDUCTIVE HEARING LOSS, BILATERAL: Primary | ICD-10-CM

## 2018-01-23 DIAGNOSIS — E78.2 MIXED HYPERLIPIDEMIA: Chronic | ICD-10-CM

## 2018-01-23 DIAGNOSIS — G47.33 OBSTRUCTIVE SLEEP APNEA SYNDROME: Chronic | ICD-10-CM

## 2018-01-23 LAB
A/G RATIO: 1.5 (ref 1.1–2.2)
ALBUMIN SERPL-MCNC: 4.4 G/DL (ref 3.4–5)
ALP BLD-CCNC: 81 U/L (ref 40–129)
ALT SERPL-CCNC: 16 U/L (ref 10–40)
ANION GAP SERPL CALCULATED.3IONS-SCNC: 13 MMOL/L (ref 3–16)
AST SERPL-CCNC: 14 U/L (ref 15–37)
BILIRUB SERPL-MCNC: <0.2 MG/DL (ref 0–1)
BUN BLDV-MCNC: 15 MG/DL (ref 7–20)
CALCIUM SERPL-MCNC: 9.7 MG/DL (ref 8.3–10.6)
CHLORIDE BLD-SCNC: 106 MMOL/L (ref 99–110)
CHOLESTEROL, TOTAL: 225 MG/DL (ref 0–199)
CO2: 27 MMOL/L (ref 21–32)
CREAT SERPL-MCNC: 1.1 MG/DL (ref 0.9–1.3)
GFR AFRICAN AMERICAN: >60
GFR NON-AFRICAN AMERICAN: >60
GLOBULIN: 2.9 G/DL
GLUCOSE BLD-MCNC: 104 MG/DL (ref 70–99)
HDLC SERPL-MCNC: 46 MG/DL (ref 40–60)
LDL CHOLESTEROL CALCULATED: 164 MG/DL
POTASSIUM SERPL-SCNC: 5.2 MMOL/L (ref 3.5–5.1)
SODIUM BLD-SCNC: 146 MMOL/L (ref 136–145)
TOTAL PROTEIN: 7.3 G/DL (ref 6.4–8.2)
TRIGL SERPL-MCNC: 76 MG/DL (ref 0–150)
URIC ACID, SERUM: 9.3 MG/DL (ref 3.5–7.2)
VLDLC SERPL CALC-MCNC: 15 MG/DL

## 2018-01-23 PROCEDURE — G8417 CALC BMI ABV UP PARAM F/U: HCPCS | Performed by: FAMILY MEDICINE

## 2018-01-23 PROCEDURE — 4130F TOPICAL PREP RX AOE: CPT | Performed by: FAMILY MEDICINE

## 2018-01-23 PROCEDURE — 3017F COLORECTAL CA SCREEN DOC REV: CPT | Performed by: FAMILY MEDICINE

## 2018-01-23 PROCEDURE — G8484 FLU IMMUNIZE NO ADMIN: HCPCS | Performed by: FAMILY MEDICINE

## 2018-01-23 PROCEDURE — 99214 OFFICE O/P EST MOD 30 MIN: CPT | Performed by: FAMILY MEDICINE

## 2018-01-23 PROCEDURE — G8427 DOCREV CUR MEDS BY ELIG CLIN: HCPCS | Performed by: FAMILY MEDICINE

## 2018-01-23 PROCEDURE — 1036F TOBACCO NON-USER: CPT | Performed by: FAMILY MEDICINE

## 2018-01-23 PROCEDURE — 3046F HEMOGLOBIN A1C LEVEL >9.0%: CPT | Performed by: FAMILY MEDICINE

## 2018-01-23 ASSESSMENT — ENCOUNTER SYMPTOMS
GASTROINTESTINAL NEGATIVE: 1
RESPIRATORY NEGATIVE: 1
EYES NEGATIVE: 1

## 2018-01-24 LAB
ESTIMATED AVERAGE GLUCOSE: 134.1 MG/DL
HBA1C MFR BLD: 6.3 %

## 2018-01-24 NOTE — PROGRESS NOTES
1/23/18    Anne Persaud  1965      Chief Complaint   Patient presents with    Otalgia     left ear pain      Ear Pain: Patient presents with bilateral ear pain. Symptoms include bilateral ear pain and plugged sensation in both ears. Symptoms began a few days ago and are gradually worsening since that time. Patient denies dyspnea. Ear history: several previous ear infections. Gout: Patient here for evaluation of acute gouty arthritis. The patient reports no acute gout attacks since last clinic visit. Attacks occur primarily in the right foot. Patient reports his chronic pain is resolved, his joint stiffness is resolved and his joint swelling is resolved. Limitation on activities include none. The patient is avoiding high purine foods and reports consuming 0 alcoholic drinks per days. Dyslipidemia: Patient presents for evaluation of lipids. Compliance with treatment thus far has been good. A repeat fasting lipid profile was not done. The patient does not use medications that may worsen dyslipidemias (corticosteroids, progestins, anabolic steroids, diuretics, beta-blockers, amiodarone, cyclosporine, olanzapine). The patient exercises intermittently. The patient is not known to have coexisting coronary artery disease. Diabetes Mellitus Type II, Follow-up: Patient here for follow-up of Type 2 diabetes mellitus. Current symptoms/problems include hyperglycemia and have been stable. Symptoms have been present for several years. Known diabetic complications: nephropathy  Cardiovascular risk factors: advanced age (older than 54 for men, 72 for women) and obesity (BMI >= 30 kg/m2)  Current diabetic medications include none.      Eye exam current (within one year): yes  Weight trend: stable  Prior visit with dietician: yes -   Current diet: in general, a \"healthy\" diet    Current exercise: aerobics    Current monitoring regimen: none  Home blood sugar records: patient does not test  Any episodes of hypoglycemia? no    Is He on ACE inhibitor or angiotensin II receptor blocker?    Yes   lisinopril (generic)        Vitals:    01/23/18 1132   BP: 134/86   Temp: 98.1 °F (36.7 °C)         Immunization History   Administered Date(s) Administered    Influenza Vaccine, unspecified formulation 10/26/2010, 01/24/2012    Influenza Virus Vaccine 11/09/2012    Tdap (Boostrix, Adacel) 08/09/2017       No Known Allergies  Outpatient Prescriptions Marked as Taking for the 1/23/18 encounter (Office Visit) with Dae Vo MD   Medication Sig Dispense Refill    neomycin-polymyxin-hydrocortisone (CORTISPORIN) [id number] otic solution Place 3 drops in ear(s) 3 times daily for 10 days 1 Bottle 0       Past Medical History:   Diagnosis Date    Chicken pox     Diabetes mellitus ([address]     per H&P    CORTES (generalized anxiety disorder)     Gout     Hyperlipidemia     Obstructive sleep apnea syndrome 08/08/2016    doesn't use CPAP    Panic attacks      Past Surgical History:   Procedure Laterality Date    COLONOSCOPY      HAND SURGERY      KNEE ARTHROSCOPY Right 09/14/2017    RIGHT KNEE ARTHROSCOPY WITH PARTIAL MEDIAL AND LATERAL MENISCECTOMY,CHONDROPLASTY,SYNOVECTOMY    KNEE SURGERY      left meniscus    MENISCECTOMY Left     TONSILLECTOMY      WISDOM TOOTH EXTRACTION       Family History   Problem Relation Age of Onset    Elevated Lipids Mother     High Cholesterol Mother      Social History     Social History    Marital status:      Spouse name: N/A    Number of children: 2    Years of education: N/A     Occupational History    [employer]      Social History Main Topics    Smoking status: Never Smoker    Smokeless tobacco: Never Used    Alcohol use No      Comment: rarely    Drug use: No    Sexual activity: Yes     Partners: Female     Other Topics Concern    Not on file     Social History Narrative    No narrative on file         Any recent diagnostic tests,

## 2018-03-27 DIAGNOSIS — E78.2 MIXED HYPERLIPIDEMIA: ICD-10-CM

## 2018-03-28 RX ORDER — ATORVASTATIN CALCIUM 10 MG/1
10 TABLET, FILM COATED ORAL NIGHTLY
Qty: 90 TABLET | Refills: 0 | Status: SHIPPED | OUTPATIENT
Start: 2018-03-28 | End: 2018-06-28 | Stop reason: SINTOL

## 2018-05-07 ENCOUNTER — TELEPHONE (OUTPATIENT)
Dept: FAMILY MEDICINE CLINIC | Age: 53
End: 2018-05-07

## 2018-05-21 ENCOUNTER — OFFICE VISIT (OUTPATIENT)
Dept: FAMILY MEDICINE CLINIC | Age: 53
End: 2018-05-21

## 2018-05-21 VITALS
DIASTOLIC BLOOD PRESSURE: 80 MMHG | HEIGHT: 67 IN | WEIGHT: 315 LBS | HEART RATE: 88 BPM | OXYGEN SATURATION: 95 % | SYSTOLIC BLOOD PRESSURE: 125 MMHG | BODY MASS INDEX: 49.44 KG/M2

## 2018-05-21 DIAGNOSIS — E11.9 CONTROLLED TYPE 2 DIABETES MELLITUS WITHOUT COMPLICATION, WITHOUT LONG-TERM CURRENT USE OF INSULIN (HCC): ICD-10-CM

## 2018-05-21 DIAGNOSIS — E78.2 MIXED HYPERLIPIDEMIA: Chronic | ICD-10-CM

## 2018-05-21 DIAGNOSIS — E66.01 MORBID OBESITY WITH BMI OF 45.0-49.9, ADULT (HCC): ICD-10-CM

## 2018-05-21 DIAGNOSIS — M50.30 DEGENERATIVE DISC DISEASE, CERVICAL: Primary | ICD-10-CM

## 2018-05-21 DIAGNOSIS — M1A.09X0 IDIOPATHIC CHRONIC GOUT OF MULTIPLE SITES WITHOUT TOPHUS: ICD-10-CM

## 2018-05-21 PROCEDURE — 3017F COLORECTAL CA SCREEN DOC REV: CPT | Performed by: FAMILY MEDICINE

## 2018-05-21 PROCEDURE — 1036F TOBACCO NON-USER: CPT | Performed by: FAMILY MEDICINE

## 2018-05-21 PROCEDURE — G8417 CALC BMI ABV UP PARAM F/U: HCPCS | Performed by: FAMILY MEDICINE

## 2018-05-21 PROCEDURE — 2022F DILAT RTA XM EVC RTNOPTHY: CPT | Performed by: FAMILY MEDICINE

## 2018-05-21 PROCEDURE — 3044F HG A1C LEVEL LT 7.0%: CPT | Performed by: FAMILY MEDICINE

## 2018-05-21 PROCEDURE — 99214 OFFICE O/P EST MOD 30 MIN: CPT | Performed by: FAMILY MEDICINE

## 2018-05-21 PROCEDURE — G8427 DOCREV CUR MEDS BY ELIG CLIN: HCPCS | Performed by: FAMILY MEDICINE

## 2018-05-21 RX ORDER — CELERY SEED OIL
OIL (ML) MISCELLANEOUS
COMMUNITY
End: 2018-07-19

## 2018-05-21 RX ORDER — MULTIVIT-MIN/IRON/FOLIC ACID/K 18-600-40
CAPSULE ORAL
COMMUNITY
End: 2018-07-19

## 2018-05-22 LAB
A/G RATIO: 1.6 (ref 1.1–2.2)
ALBUMIN SERPL-MCNC: 4.5 G/DL (ref 3.4–5)
ALP BLD-CCNC: 83 U/L (ref 40–129)
ALT SERPL-CCNC: 22 U/L (ref 10–40)
ANION GAP SERPL CALCULATED.3IONS-SCNC: 14 MMOL/L (ref 3–16)
AST SERPL-CCNC: 19 U/L (ref 15–37)
BILIRUB SERPL-MCNC: 0.5 MG/DL (ref 0–1)
BUN BLDV-MCNC: 9 MG/DL (ref 7–20)
CALCIUM SERPL-MCNC: 9.6 MG/DL (ref 8.3–10.6)
CHLORIDE BLD-SCNC: 101 MMOL/L (ref 99–110)
CHOLESTEROL, TOTAL: 202 MG/DL (ref 0–199)
CO2: 26 MMOL/L (ref 21–32)
CREAT SERPL-MCNC: 1 MG/DL (ref 0.9–1.3)
ESTIMATED AVERAGE GLUCOSE: 137 MG/DL
GFR AFRICAN AMERICAN: >60
GFR NON-AFRICAN AMERICAN: >60
GLOBULIN: 2.8 G/DL
GLUCOSE BLD-MCNC: 87 MG/DL (ref 70–99)
HBA1C MFR BLD: 6.4 %
HDLC SERPL-MCNC: 51 MG/DL (ref 40–60)
LDL CHOLESTEROL CALCULATED: 117 MG/DL
POTASSIUM SERPL-SCNC: 4.2 MMOL/L (ref 3.5–5.1)
SODIUM BLD-SCNC: 141 MMOL/L (ref 136–145)
TOTAL PROTEIN: 7.3 G/DL (ref 6.4–8.2)
TRIGL SERPL-MCNC: 169 MG/DL (ref 0–150)
URIC ACID, SERUM: 9.6 MG/DL (ref 3.5–7.2)
VLDLC SERPL CALC-MCNC: 34 MG/DL

## 2018-05-23 ENCOUNTER — PATIENT MESSAGE (OUTPATIENT)
Dept: FAMILY MEDICINE CLINIC | Age: 53
End: 2018-05-23

## 2018-05-23 DIAGNOSIS — H69.83 DYSFUNCTION OF BOTH EUSTACHIAN TUBES: Primary | ICD-10-CM

## 2018-05-23 ASSESSMENT — PATIENT HEALTH QUESTIONNAIRE - PHQ9
SUM OF ALL RESPONSES TO PHQ QUESTIONS 1-9: 2
1. LITTLE INTEREST OR PLEASURE IN DOING THINGS: 1
SUM OF ALL RESPONSES TO PHQ9 QUESTIONS 1 & 2: 2
2. FEELING DOWN, DEPRESSED OR HOPELESS: 1

## 2018-05-23 ASSESSMENT — ENCOUNTER SYMPTOMS
GASTROINTESTINAL NEGATIVE: 1
RESPIRATORY NEGATIVE: 1
EYES NEGATIVE: 1

## 2018-06-07 ENCOUNTER — PATIENT MESSAGE (OUTPATIENT)
Dept: FAMILY MEDICINE CLINIC | Age: 53
End: 2018-06-07

## 2018-06-07 DIAGNOSIS — H60.312 ACUTE DIFFUSE OTITIS EXTERNA OF LEFT EAR: ICD-10-CM

## 2018-06-26 ENCOUNTER — PATIENT MESSAGE (OUTPATIENT)
Dept: FAMILY MEDICINE CLINIC | Age: 53
End: 2018-06-26

## 2018-06-26 DIAGNOSIS — M1A.09X0 IDIOPATHIC CHRONIC GOUT OF MULTIPLE SITES WITHOUT TOPHUS: Primary | ICD-10-CM

## 2018-06-28 ENCOUNTER — PATIENT MESSAGE (OUTPATIENT)
Dept: FAMILY MEDICINE CLINIC | Age: 53
End: 2018-06-28

## 2018-06-28 DIAGNOSIS — E78.2 MIXED HYPERLIPIDEMIA: ICD-10-CM

## 2018-06-28 RX ORDER — ALLOPURINOL 300 MG/1
300 TABLET ORAL DAILY
Qty: 90 TABLET | Refills: 1 | Status: SHIPPED | OUTPATIENT
Start: 2018-06-28 | End: 2018-11-26 | Stop reason: CLARIF

## 2018-06-28 RX ORDER — METHYLPREDNISOLONE 4 MG/1
TABLET ORAL
Qty: 1 KIT | Refills: 0 | Status: SHIPPED | OUTPATIENT
Start: 2018-06-28 | End: 2018-07-12

## 2018-06-30 RX ORDER — ATORVASTATIN CALCIUM 10 MG/1
10 TABLET, FILM COATED ORAL NIGHTLY
Qty: 90 TABLET | Refills: 1 | Status: SHIPPED | OUTPATIENT
Start: 2018-06-30 | End: 2018-08-29

## 2018-07-12 ENCOUNTER — OFFICE VISIT (OUTPATIENT)
Dept: ENT CLINIC | Age: 53
End: 2018-07-12

## 2018-07-12 VITALS
DIASTOLIC BLOOD PRESSURE: 77 MMHG | HEART RATE: 102 BPM | SYSTOLIC BLOOD PRESSURE: 118 MMHG | WEIGHT: 315 LBS | TEMPERATURE: 98.1 F | HEIGHT: 68 IN | BODY MASS INDEX: 47.74 KG/M2

## 2018-07-12 DIAGNOSIS — H90.2 EXTERNAL EAR CONDUCTIVE HEARING LOSS: ICD-10-CM

## 2018-07-12 DIAGNOSIS — H61.23 BILATERAL IMPACTED CERUMEN: ICD-10-CM

## 2018-07-12 PROCEDURE — 69210 REMOVE IMPACTED EAR WAX UNI: CPT | Performed by: OTOLARYNGOLOGY

## 2018-07-12 PROCEDURE — 99999 PR OFFICE/OUTPT VISIT,PROCEDURE ONLY: CPT | Performed by: OTOLARYNGOLOGY

## 2018-07-19 ENCOUNTER — OFFICE VISIT (OUTPATIENT)
Dept: BARIATRICS/WEIGHT MGMT | Age: 53
End: 2018-07-19

## 2018-07-19 VITALS
SYSTOLIC BLOOD PRESSURE: 139 MMHG | DIASTOLIC BLOOD PRESSURE: 92 MMHG | HEIGHT: 68 IN | BODY MASS INDEX: 47.74 KG/M2 | WEIGHT: 315 LBS | HEART RATE: 80 BPM

## 2018-07-19 DIAGNOSIS — E66.01 MORBID OBESITY WITH BMI OF 50.0-59.9, ADULT (HCC): Primary | ICD-10-CM

## 2018-07-19 DIAGNOSIS — G47.33 OBSTRUCTIVE SLEEP APNEA: ICD-10-CM

## 2018-07-19 DIAGNOSIS — K21.9 CHRONIC GERD: ICD-10-CM

## 2018-07-19 PROCEDURE — 3017F COLORECTAL CA SCREEN DOC REV: CPT | Performed by: SURGERY

## 2018-07-19 PROCEDURE — 1036F TOBACCO NON-USER: CPT | Performed by: SURGERY

## 2018-07-19 PROCEDURE — 99205 OFFICE O/P NEW HI 60 MIN: CPT | Performed by: SURGERY

## 2018-07-19 PROCEDURE — G8417 CALC BMI ABV UP PARAM F/U: HCPCS | Performed by: SURGERY

## 2018-07-19 PROCEDURE — G8427 DOCREV CUR MEDS BY ELIG CLIN: HCPCS | Performed by: SURGERY

## 2018-07-19 NOTE — PROGRESS NOTES
describes level of activity as - sedentary, struggling w/ gout & steroids, needs knee replacement. Surgery  Patient's greatest concern about having surgery is: anastesia. Patient describes the motivation for weight loss surgery to be: has a  & needs to be able to run around w/ her / look better. Patient does feel confident in his ability to make these changes. The patient's expectations of post-surgical eating habits - voices understanding. Patient states he does understand the consequences of not complying with post-op food guidelines. Patient states he understands the long term changes in food intake that will be necessary for all occasions after surgery for the rest of her life. he does understand the long term food changes. Patient is deemed nutritionally appropriate to proceed. Goals  Weight: 185 lb / 250 lb (inital goal)  Health Improvement: improved joint / lower cholesterol / decreased medication / decreased GERD    Assessment  Nutritional Needs: RMR=(9.99 x 160) + (6.25 x 173) - (4.92 x 53 y.o.) +5= 2418 kcal x 1.4 (sedentary activity factor)= 3385 kcal - 1000 (for 2 lb weight loss/week)= 2385 kcal.    Plan  Surgical procedure desired: sleeve    Plan/Recommendations: NP packet / dietary guidelines    Goals:   -Eat 4-5 times daily  -Avoid high fat and high sugar foods  -Include protein with all meals and snacks  -Avoid carbonation and caffeine  -Avoid calorie containing beverages  -Increase physical activity as tolerated    PES Statement:  Overweight/Obesity related to lack of exercise, sedentary lifestyle, unhealthy eating habits, and unsuccessful diet attempts as evidenced by BMI. Body mass index is 53.58 kg/m². .    Will follow up as necessary.     Abraham Silverman
left eye. No scleral icterus. Neck: Trachea normal and normal range of motion. Neck supple. No JVD present. No tracheal tenderness present. Carotid bruit is not present. No rigidity. No tracheal deviation and no edema present. No thyromegaly present. Cardiovascular: Normal rate, regular rhythm, normal heart sounds, intact distal pulses and normal pulses. Pulmonary/Chest: Effort normal and breath sounds normal. No stridor. No respiratory distress. Patient  has no wheezes. Patient has no rales. Patient exhibits no tenderness and no crepitus. Abdominal: Soft. Normal appearance and bowel sounds are normal. Patient exhibits no distension, no abdominal bruit, no ascites and no mass. There is no hepatosplenomegaly. There is no tenderness. There is no rigidity, no rebound, no guarding and no CVA tenderness. No hernia. Hernia confirmed negative in the ventral area. Musculoskeletal: Normal range of motion. Patient exhibits no edema or tenderness. Lymphadenopathy:        Head (right side): No submental, no submandibular, no preauricular, no posterior auricular and no occipital adenopathy present. Head (left side): No submental, no submandibular, no preauricular, no posterior auricular and no occipital adenopathy present. Patient  has no cervical adenopathy. Right: No supraclavicular adenopathy present. Left: No supraclavicular adenopathy present. Neurological: Patient is alert and oriented to person, place, and time. Patient has normal strength. Coordination and gait normal. GCS eye subscore is 4. GCS verbal subscore is 5. GCS motor subscore is 6. Skin: Skin is warm and dry. No abrasion and no rash noted. Patient  is not diaphoretic. No cyanosis or erythema. Psychiatric: Patient has a normal mood and affect.   speech is normal and behavior is normal. Cognition and memory are normal.             A/P  Patrick Gorman is a very pleasant 46 y.o. male with Obesity,  Body mass index is 53.58

## 2018-08-07 DIAGNOSIS — E66.01 MORBID OBESITY WITH BMI OF 50.0-59.9, ADULT (HCC): ICD-10-CM

## 2018-08-07 LAB
A/G RATIO: 1.7 (ref 1.1–2.2)
ALBUMIN SERPL-MCNC: 4.6 G/DL (ref 3.4–5)
ALP BLD-CCNC: 79 U/L (ref 40–129)
ALT SERPL-CCNC: 25 U/L (ref 10–40)
ANION GAP SERPL CALCULATED.3IONS-SCNC: 11 MMOL/L (ref 3–16)
AST SERPL-CCNC: 22 U/L (ref 15–37)
BASOPHILS ABSOLUTE: 0 K/UL (ref 0–0.2)
BASOPHILS RELATIVE PERCENT: 0.7 %
BILIRUB SERPL-MCNC: 0.6 MG/DL (ref 0–1)
BUN BLDV-MCNC: 10 MG/DL (ref 7–20)
CALCIUM SERPL-MCNC: 9.8 MG/DL (ref 8.3–10.6)
CHLORIDE BLD-SCNC: 106 MMOL/L (ref 99–110)
CHOLESTEROL, TOTAL: 155 MG/DL (ref 0–199)
CO2: 27 MMOL/L (ref 21–32)
CREAT SERPL-MCNC: 1 MG/DL (ref 0.9–1.3)
EOSINOPHILS ABSOLUTE: 0.2 K/UL (ref 0–0.6)
EOSINOPHILS RELATIVE PERCENT: 4.5 %
FOLATE: 10.59 NG/ML (ref 4.78–24.2)
GFR AFRICAN AMERICAN: >60
GFR NON-AFRICAN AMERICAN: >60
GLOBULIN: 2.7 G/DL
GLUCOSE BLD-MCNC: 90 MG/DL (ref 70–99)
HCT VFR BLD CALC: 43.9 % (ref 40.5–52.5)
HDLC SERPL-MCNC: 42 MG/DL (ref 40–60)
HEMOGLOBIN: 14.3 G/DL (ref 13.5–17.5)
IRON SATURATION: 30 % (ref 20–50)
IRON: 97 UG/DL (ref 59–158)
LDL CHOLESTEROL CALCULATED: 92 MG/DL
LYMPHOCYTES ABSOLUTE: 2 K/UL (ref 1–5.1)
LYMPHOCYTES RELATIVE PERCENT: 42.7 %
MCH RBC QN AUTO: 29.2 PG (ref 26–34)
MCHC RBC AUTO-ENTMCNC: 32.6 G/DL (ref 31–36)
MCV RBC AUTO: 89.5 FL (ref 80–100)
MONOCYTES ABSOLUTE: 0.3 K/UL (ref 0–1.3)
MONOCYTES RELATIVE PERCENT: 7.4 %
NEUTROPHILS ABSOLUTE: 2.1 K/UL (ref 1.7–7.7)
NEUTROPHILS RELATIVE PERCENT: 44.7 %
PDW BLD-RTO: 14.8 % (ref 12.4–15.4)
PLATELET # BLD: 290 K/UL (ref 135–450)
PMV BLD AUTO: 8.5 FL (ref 5–10.5)
POTASSIUM SERPL-SCNC: 5 MMOL/L (ref 3.5–5.1)
RBC # BLD: 4.9 M/UL (ref 4.2–5.9)
SODIUM BLD-SCNC: 144 MMOL/L (ref 136–145)
TOTAL IRON BINDING CAPACITY: 326 UG/DL (ref 260–445)
TOTAL PROTEIN: 7.3 G/DL (ref 6.4–8.2)
TRIGL SERPL-MCNC: 104 MG/DL (ref 0–150)
TSH REFLEX: 1.42 UIU/ML (ref 0.27–4.2)
VITAMIN B-12: 628 PG/ML (ref 211–911)
VITAMIN D 25-HYDROXY: 27.9 NG/ML
VLDLC SERPL CALC-MCNC: 21 MG/DL
WBC # BLD: 4.7 K/UL (ref 4–11)

## 2018-08-08 LAB
ESTIMATED AVERAGE GLUCOSE: 142.7 MG/DL
HBA1C MFR BLD: 6.6 %

## 2018-08-09 ENCOUNTER — TELEPHONE (OUTPATIENT)
Dept: FAMILY MEDICINE CLINIC | Age: 53
End: 2018-08-09

## 2018-08-13 ENCOUNTER — HOSPITAL ENCOUNTER (EMERGENCY)
Age: 53
Discharge: LEFT BEFORE DISCHARGE | End: 2018-08-13

## 2018-08-13 PROCEDURE — 4500000002 HC ER NO CHARGE

## 2018-08-23 ENCOUNTER — OFFICE VISIT (OUTPATIENT)
Dept: BARIATRICS/WEIGHT MGMT | Age: 53
End: 2018-08-23

## 2018-08-23 VITALS
WEIGHT: 315 LBS | HEART RATE: 92 BPM | BODY MASS INDEX: 51.45 KG/M2 | SYSTOLIC BLOOD PRESSURE: 143 MMHG | DIASTOLIC BLOOD PRESSURE: 93 MMHG

## 2018-08-23 DIAGNOSIS — K21.9 CHRONIC GERD: ICD-10-CM

## 2018-08-23 DIAGNOSIS — E66.01 MORBID OBESITY WITH BMI OF 50.0-59.9, ADULT (HCC): Primary | ICD-10-CM

## 2018-08-23 DIAGNOSIS — G47.33 OBSTRUCTIVE SLEEP APNEA: ICD-10-CM

## 2018-08-23 PROCEDURE — G8417 CALC BMI ABV UP PARAM F/U: HCPCS | Performed by: SURGERY

## 2018-08-23 PROCEDURE — 1036F TOBACCO NON-USER: CPT | Performed by: SURGERY

## 2018-08-23 PROCEDURE — 99213 OFFICE O/P EST LOW 20 MIN: CPT | Performed by: SURGERY

## 2018-08-23 PROCEDURE — G8427 DOCREV CUR MEDS BY ELIG CLIN: HCPCS | Performed by: SURGERY

## 2018-08-23 PROCEDURE — 3017F COLORECTAL CA SCREEN DOC REV: CPT | Performed by: SURGERY

## 2018-08-23 NOTE — PROGRESS NOTES
Alma Dunham lost 14 lbs over past ~month. Pt gave up alcohol (occasional wine cooler), no fried foods & increased water. Breakfast: egg whites w/ meatless sausage     Snack: none    Lunch: grilled chicken/HB egg w/ pickles on salad    Snack: sometimes fruit w/ HB egg    Dinner: skinless smoke sausage w/ greens OR green beans OR asparagus some desserts a little bit of apple cobbler    Snack: none    Is pt consuming smaller portions? yes - mindful / eating till satisfied / smaller plate    Is pt consuming at least 64 oz of fluids per day? yes - only drinking water / vitamin water / flavor pkts    Is pt consuming carbonated, caffeinated, or sugary beverages? no    Has pt sampled Unjury and/or Nectar protein? Bought some, hasn't tried it yet    Exercise: walking a little bit more, limited some w/ know    Plan/Recommendations:   1. Continue plan  2. Focus on getting 4th meal/snack w/ protein consistently  3. Try protein powder  4.   Avoid simple sugars/dessert - apple cobbler    Handouts: none    Elena Beltran

## 2018-08-23 NOTE — PROGRESS NOTES
Fatty Acids (OMEGA-3 FISH OIL) 1000 MG CAPS, Take by mouth daily, Disp: , Rfl:       Review of Systems - History obtained from the patient  General ROS: negative  Psychological ROS: negative  Endocrine ROS: negative  Respiratory ROS: negative  Cardiovascular ROS: negative  Gastrointestinal ROS:negative  Genito-Urinary ROS: negative  Musculoskeletal ROS: negative   Skin ROS: negative    Physical Exam   Vitals Reviewed   Constitutional: Patient is oriented to person, place, and time. Patient appears well-developed and well-nourished. Patient is active and cooperative. Non-toxic appearance. No distress. Neck: Trachea normal and normal range of motion. No JVD present. Pulmonary/Chest: Effort normal. No accessory muscle usage or stridor. No apnea. No respiratory distress. Cardiovascular: Normal rate and no JVD. Abdominal: Normal appearance. Patient exhibits no distension. Abdomen is soft, obese, non tender. Musculoskeletal: Normal range of motion. Patient exhibits no edema. Neurological: Patient is alert and oriented to person, place, and time. Patient has normal strength. GCS eye subscore is 4. GCS verbal subscore is 5. GCS motor subscore is 6. Skin: Skin is warm and dry. No abrasion and no rash noted. Patient is not diaphoretic. No cyanosis or erythema. Psychiatric: Patient has a normal mood and affect. Speech is normal and behavior is normal. Cognition and memory are normal.       A/P    Hebert Morgan is 46 y.o. male, Body mass index is 51.45 kg/m². pre surgery, has lost 14# since last visit. The patient underwent dietary counseling with registered dietician. I have reviewed, discussed and agree with the dietary plan. Patient is trying hard to keep good dietary and behavior modifications. Patient is monitoring portion sizes, food choices and liquid calories. Patient is trying to exercise regularly as much as possible.   We discussed how his weight affects his overall health including:  Patient Active Problem List   Diagnosis    Routine general medical examination at a health care facility    Staph skin infection    Microalbuminuria    Hyperglycemia    Polyuria    Failure of erection    Chronic kidney disease, stage II (mild)    Benign paroxysmal positional nystagmus    Erectile dysfunction    Vitamin D deficiency    Allergic rhinitis    Benign paroxysmal positional vertigo    Morbid obesity with BMI of 50.0-59.9, adult (HCC)    HLD (hyperlipidemia)    Obstructive sleep apnea syndrome    Panic attack    Controlled type 2 diabetes mellitus without complication, without long-term current use of insulin (HCC)    Primary osteoarthritis of both knees    Complex tear of lateral meniscus of right knee as current injury    Complex tear of medial meniscus of right knee as current injury    Degenerative disc disease, cervical    Idiopathic chronic gout of multiple sites without tophus    Chronic GERD    Hemorrhoid    Numbness    and importance of weight loss to alleviate those co morbid conditions. I encouraged the patient to continue exercise and keeping healthy eating habits. Discussed pre-op labs and work up till now. Also counseled the patient extensively on Surgery. 15 minutes spent counseling the patient, answering questions and addressing concerns as well reviewing labs and/or other studies as well coordinating care. More than 50% was face to face time counseling the patient. RTC in 4 weeks  Obtain rest of pre-op work up / clearances  Diet and Exercise   EGD     Patient advised that its their responsibility to follow up for studies and/or labs ordered today.      Ana Jarquin, DO

## 2018-08-23 NOTE — PATIENT INSTRUCTIONS
Patient received dietary handouts and education. Plan/Recommendations:   1. Continue plan  2. Focus on getting 4th meal/snack w/ protein consistently  3. Try protein powder  4.   Avoid simple sugars - apple cobbler

## 2018-08-29 ENCOUNTER — OFFICE VISIT (OUTPATIENT)
Dept: PULMONOLOGY | Age: 53
End: 2018-08-29

## 2018-08-29 VITALS
SYSTOLIC BLOOD PRESSURE: 118 MMHG | DIASTOLIC BLOOD PRESSURE: 72 MMHG | HEART RATE: 68 BPM | HEIGHT: 68 IN | OXYGEN SATURATION: 98 % | WEIGHT: 315 LBS | BODY MASS INDEX: 47.74 KG/M2

## 2018-08-29 DIAGNOSIS — J30.9 ALLERGIC RHINITIS, UNSPECIFIED SEASONALITY, UNSPECIFIED TRIGGER: Chronic | ICD-10-CM

## 2018-08-29 DIAGNOSIS — G47.33 OBSTRUCTIVE SLEEP APNEA SYNDROME: Primary | Chronic | ICD-10-CM

## 2018-08-29 DIAGNOSIS — E11.9 CONTROLLED TYPE 2 DIABETES MELLITUS WITHOUT COMPLICATION, WITHOUT LONG-TERM CURRENT USE OF INSULIN (HCC): Chronic | ICD-10-CM

## 2018-08-29 DIAGNOSIS — N18.2 CHRONIC KIDNEY DISEASE, STAGE II (MILD): Chronic | ICD-10-CM

## 2018-08-29 DIAGNOSIS — K21.9 CHRONIC GERD: Chronic | ICD-10-CM

## 2018-08-29 PROCEDURE — 2022F DILAT RTA XM EVC RTNOPTHY: CPT | Performed by: NURSE PRACTITIONER

## 2018-08-29 PROCEDURE — 1036F TOBACCO NON-USER: CPT | Performed by: NURSE PRACTITIONER

## 2018-08-29 PROCEDURE — 3044F HG A1C LEVEL LT 7.0%: CPT | Performed by: NURSE PRACTITIONER

## 2018-08-29 PROCEDURE — 99214 OFFICE O/P EST MOD 30 MIN: CPT | Performed by: NURSE PRACTITIONER

## 2018-08-29 PROCEDURE — 3017F COLORECTAL CA SCREEN DOC REV: CPT | Performed by: NURSE PRACTITIONER

## 2018-08-29 PROCEDURE — G8417 CALC BMI ABV UP PARAM F/U: HCPCS | Performed by: NURSE PRACTITIONER

## 2018-08-29 PROCEDURE — G8427 DOCREV CUR MEDS BY ELIG CLIN: HCPCS | Performed by: NURSE PRACTITIONER

## 2018-08-29 RX ORDER — ATORVASTATIN CALCIUM 40 MG/1
40 TABLET, FILM COATED ORAL DAILY
COMMUNITY
End: 2018-11-26 | Stop reason: CLARIF

## 2018-08-29 ASSESSMENT — SLEEP AND FATIGUE QUESTIONNAIRES
HOW LIKELY ARE YOU TO NOD OFF OR FALL ASLEEP WHILE SITTING INACTIVE IN A PUBLIC PLACE: 3
HOW LIKELY ARE YOU TO NOD OFF OR FALL ASLEEP WHILE SITTING QUIETLY AFTER LUNCH WITHOUT ALCOHOL: 3
HOW LIKELY ARE YOU TO NOD OFF OR FALL ASLEEP WHILE SITTING AND READING: 3
ESS TOTAL SCORE: 22
HOW LIKELY ARE YOU TO NOD OFF OR FALL ASLEEP WHILE WATCHING TV: 3
HOW LIKELY ARE YOU TO NOD OFF OR FALL ASLEEP WHILE LYING DOWN TO REST IN THE AFTERNOON WHEN CIRCUMSTANCES PERMIT: 3
HOW LIKELY ARE YOU TO NOD OFF OR FALL ASLEEP IN A CAR, WHILE STOPPED FOR A FEW MINUTES IN TRAFFIC: 1
HOW LIKELY ARE YOU TO NOD OFF OR FALL ASLEEP WHILE SITTING AND TALKING TO SOMEONE: 3
HOW LIKELY ARE YOU TO NOD OFF OR FALL ASLEEP WHEN YOU ARE A PASSENGER IN A CAR FOR AN HOUR WITHOUT A BREAK: 3

## 2018-08-29 ASSESSMENT — ENCOUNTER SYMPTOMS
ABDOMINAL DISTENTION: 0
COUGH: 0
APNEA: 0
SINUS PRESSURE: 0
SHORTNESS OF BREATH: 0
RHINORRHEA: 0
ABDOMINAL PAIN: 0

## 2018-08-29 NOTE — LETTER
[facility]  [address]   [facility]  [address]  [place] [address]  Phone: [phone]  Fax: [phone]    August 29, 2018       Patient: Patrick Gorman   MR Number: [id number]   YOB: 1965   Date of Visit: 8/29/2018       Crow Gregg was seen for a follow up visit today. Here is my assessment and plan as well as an attached copy of his visit today:    No problem-specific Assessment & Plan notes found for this encounter. If you have questions or concerns, please do not hesitate to call me. I look forward to following Leatha Coronado along with you. Sincerely,    Lance Renteria, CARI - CNP    CC providers:   Marshall Wilson, [address] In Basket

## 2018-08-29 NOTE — PROGRESS NOTES
machine at this time  [] Yes  [x] No         Review of Systems   Constitutional: Negative for appetite change, chills, fatigue and fever. HENT: Negative for congestion, nosebleeds, rhinorrhea and sinus pressure. Respiratory: Negative for apnea, cough and shortness of breath. Cardiovascular: Negative for chest pain and palpitations. Gastrointestinal: Negative for abdominal distention and abdominal pain. Neurological: Negative for dizziness and headaches. Social History     Social History    Marital status: Single     Spouse name: N/A    Number of children: 2    Years of education: N/A     Occupational History    [employer]      Social History Main Topics    Smoking status: Never Smoker    Smokeless tobacco: Never Used    Alcohol use No      Comment: rarely    Drug use: No    Sexual activity: Yes     Partners: Female     Other Topics Concern    Not on file     Social History Narrative    No narrative on file       Prior to Admission medications    Medication Sig Start Date End Date Taking?  Authorizing Provider   atorvastatin (LIPITOR) 40 MG tablet Take 40 mg by mouth daily   Yes Historical Provider, MD   allopurinol (ZYLOPRIM) 300 MG tablet Take 1 tablet by mouth daily 6/28/18 6/28/19 Yes Abby Finnegan MD   Cholecalciferol (VITAMIN D) 2000 units CAPS capsule Take 1 capsule by mouth daily   Yes Historical Provider, MD   Omega-3 Fatty Acids (OMEGA-3 FISH OIL) 1000 MG CAPS Take by mouth daily   Yes Historical Provider, MD       Allergies as of 08/29/2018    (No Known Allergies)       Patient Active Problem List   Diagnosis    Routine general medical examination at a health care facility    Staph skin infection    Microalbuminuria    Hyperglycemia    Polyuria    Failure of erection    Chronic kidney disease, stage II (mild)    Benign paroxysmal positional nystagmus    Erectile dysfunction    Vitamin D deficiency    Allergic rhinitis    Benign paroxysmal positional vertigo Primary    needing to treat         Controlled type 2 diabetes mellitus without complication, without long-term current use of insulin (HCC)   (Stable)           Chronic kidney disease, stage II (mild)   (Stable, but not controlled)           Chronic GERD   (Stable)           Allergic rhinitis, unspecified seasonality, unspecified trigger   (Stable)           Class 3 obesity with body mass index (BMI) of 50.0 to 59.9 in adult, unspecified obesity type, unspecified whether serious comorbidity present (HCC)   (Stable)           ORDERS WITHOUT AN ASSOCIATED DIAGNOSIS    atorvastatin (LIPITOR) 40 MG tablet [81711]            The primary encounter diagnosis was Obstructive sleep apnea syndrome. Diagnoses of Controlled type 2 diabetes mellitus without complication, without long-term current use of insulin ([facility] [address] Chronic kidney disease, stage II (mild), Chronic GERD, Allergic rhinitis, unspecified seasonality, unspecified trigger, and Class 3 obesity with body mass index (BMI) of 50.0 to 59.9 in adult, unspecified obesity type, unspecified whether serious comorbidity present [facility]) were also pertinent to this visit. The chronic medical conditions listed are directly related to the primary diagnosis listed above. The management of the primary diagnosis affects the secondary diagnosis and vice versa. Plan:   - Educated patient and reviewed down load from [facility] with the patient   - Continue medications per his PCP and other physicians.   - he instructed not to drive unless had 4 hrs of effective therapy for his PARISH the night before. - Did review the risks of under or untreated PARISH including, but not limited to, higher risks of motor vehicle accidents, stroke, heart attacks, and death. - he understands and accepts all these risks.     - The patient is advised to use the machine every night.   - Patient using  Other [employer] for supplies  - Will see back to monitor compliance, comfort, and control   -F/U: 3 months    No

## 2018-09-04 DIAGNOSIS — E66.01 MORBID OBESITY WITH BMI OF 50.0-59.9, ADULT (HCC): Primary | ICD-10-CM

## 2018-09-04 DIAGNOSIS — G47.33 OBSTRUCTIVE SLEEP APNEA SYNDROME: Chronic | ICD-10-CM

## 2018-09-04 DIAGNOSIS — K21.9 CHRONIC GERD: ICD-10-CM

## 2018-09-10 ENCOUNTER — ANESTHESIA (OUTPATIENT)
Dept: ENDOSCOPY | Age: 53
End: 2018-09-10
Payer: MEDICAID

## 2018-09-10 ENCOUNTER — HOSPITAL ENCOUNTER (OUTPATIENT)
Age: 53
Setting detail: OUTPATIENT SURGERY
Discharge: HOME | End: 2018-09-10
Attending: SURGERY | Admitting: SURGERY
Payer: MEDICAID

## 2018-09-10 ENCOUNTER — ANESTHESIA EVENT (OUTPATIENT)
Dept: ENDOSCOPY | Age: 53
End: 2018-09-10
Payer: MEDICAID

## 2018-09-10 VITALS
WEIGHT: 315 LBS | HEART RATE: 98 BPM | OXYGEN SATURATION: 100 % | RESPIRATION RATE: 22 BRPM | TEMPERATURE: 98.1 F | HEIGHT: 68 IN | BODY MASS INDEX: 47.74 KG/M2 | DIASTOLIC BLOOD PRESSURE: 91 MMHG | SYSTOLIC BLOOD PRESSURE: 109 MMHG

## 2018-09-10 VITALS
DIASTOLIC BLOOD PRESSURE: 74 MMHG | OXYGEN SATURATION: 92 % | SYSTOLIC BLOOD PRESSURE: 124 MMHG | RESPIRATION RATE: 24 BRPM

## 2018-09-10 PROCEDURE — 88305 TISSUE EXAM BY PATHOLOGIST: CPT

## 2018-09-10 PROCEDURE — 3609012400 HC EGD TRANSORAL BIOPSY SINGLE/MULTIPLE: Performed by: SURGERY

## 2018-09-10 PROCEDURE — 3700000000 HC ANESTHESIA ATTENDED CARE: Performed by: SURGERY

## 2018-09-10 PROCEDURE — 7100000011 HC PHASE II RECOVERY - ADDTL 15 MIN: Performed by: SURGERY

## 2018-09-10 PROCEDURE — 2500000003 HC RX 250 WO HCPCS: Performed by: NURSE ANESTHETIST, CERTIFIED REGISTERED

## 2018-09-10 PROCEDURE — 2720000010 HC SURG SUPPLY STERILE: Performed by: SURGERY

## 2018-09-10 PROCEDURE — 2580000003 HC RX 258: Performed by: NURSE ANESTHETIST, CERTIFIED REGISTERED

## 2018-09-10 PROCEDURE — 6360000002 HC RX W HCPCS: Performed by: NURSE ANESTHETIST, CERTIFIED REGISTERED

## 2018-09-10 PROCEDURE — 7100000010 HC PHASE II RECOVERY - FIRST 15 MIN: Performed by: SURGERY

## 2018-09-10 PROCEDURE — 3700000001 HC ADD 15 MINUTES (ANESTHESIA): Performed by: SURGERY

## 2018-09-10 PROCEDURE — 88342 IMHCHEM/IMCYTCHM 1ST ANTB: CPT

## 2018-09-10 RX ORDER — PROPOFOL 10 MG/ML
INJECTION, EMULSION INTRAVENOUS PRN
Status: DISCONTINUED | OUTPATIENT
Start: 2018-09-10 | End: 2018-09-10 | Stop reason: SDUPTHER

## 2018-09-10 RX ORDER — SODIUM CHLORIDE, SODIUM LACTATE, POTASSIUM CHLORIDE, CALCIUM CHLORIDE 600; 310; 30; 20 MG/100ML; MG/100ML; MG/100ML; MG/100ML
INJECTION, SOLUTION INTRAVENOUS CONTINUOUS PRN
Status: DISCONTINUED | OUTPATIENT
Start: 2018-09-10 | End: 2018-09-10 | Stop reason: SDUPTHER

## 2018-09-10 RX ORDER — LIDOCAINE HYDROCHLORIDE 20 MG/ML
INJECTION, SOLUTION EPIDURAL; INFILTRATION; INTRACAUDAL; PERINEURAL PRN
Status: DISCONTINUED | OUTPATIENT
Start: 2018-09-10 | End: 2018-09-10 | Stop reason: SDUPTHER

## 2018-09-10 RX ADMIN — PROPOFOL 100 MG: 10 INJECTION, EMULSION INTRAVENOUS at 11:51

## 2018-09-10 RX ADMIN — PROPOFOL 100 MG: 10 INJECTION, EMULSION INTRAVENOUS at 11:48

## 2018-09-10 RX ADMIN — PROPOFOL 100 MG: 10 INJECTION, EMULSION INTRAVENOUS at 11:55

## 2018-09-10 RX ADMIN — SODIUM CHLORIDE, SODIUM LACTATE, POTASSIUM CHLORIDE, AND CALCIUM CHLORIDE: 600; 310; 30; 20 INJECTION, SOLUTION INTRAVENOUS at 11:35

## 2018-09-10 RX ADMIN — LIDOCAINE HYDROCHLORIDE 100 MG: 20 INJECTION, SOLUTION EPIDURAL; INFILTRATION; INTRACAUDAL; PERINEURAL at 11:48

## 2018-09-10 ASSESSMENT — PULMONARY FUNCTION TESTS
PIF_VALUE: 0
PIF_VALUE: 1
PIF_VALUE: 0

## 2018-09-10 ASSESSMENT — PAIN - FUNCTIONAL ASSESSMENT: PAIN_FUNCTIONAL_ASSESSMENT: 0-10

## 2018-09-10 ASSESSMENT — PAIN DESCRIPTION - DESCRIPTORS: DESCRIPTORS: ACHING

## 2018-09-10 NOTE — ANESTHESIA PRE PROCEDURE
Department of Anesthesiology  Preprocedure Note       Name:  Javier Cerrato   Age:  46 y.o.  :  1965                                          MRN:  [id number]         Date:  9/10/2018      Surgeon: Adamaris Garcia): Sumeet Thomson DO    Procedure: Procedure(s):  EGD BIOPSY    Medications prior to admission:   Prior to Admission medications    Medication Sig Start Date End Date Taking? Authorizing Provider   atorvastatin (LIPITOR) 40 MG tablet Take 40 mg by mouth daily   Yes Historical Provider, MD   allopurinol (ZYLOPRIM) 300 MG tablet Take 1 tablet by mouth daily 18 Yes Dale Smith MD   Cholecalciferol (VITAMIN D) 2000 units CAPS capsule Take 1 capsule by mouth daily   Yes Historical Provider, MD   Omega-3 Fatty Acids (OMEGA-3 FISH OIL) 1000 MG CAPS Take by mouth daily    Historical Provider, MD       Current medications:    No current facility-administered medications for this encounter.         Allergies:  No Known Allergies    Problem List:    Patient Active Problem List   Diagnosis Code    Routine general medical examination at a health care facility Z00.00    Staph skin infection L08.9, B95.8    Microalbuminuria R80.9    Hyperglycemia R73.9    Polyuria R35.8    Failure of erection N52.9    Chronic kidney disease, stage II (mild) N18.2    Benign paroxysmal positional nystagmus H81.10    Erectile dysfunction N52.9    Vitamin D deficiency E55.9    Allergic rhinitis J30.9    Benign paroxysmal positional vertigo H81.10    Morbid obesity with BMI of 50.0-59.9, adult (HCC) E66.01, Z68.43    HLD (hyperlipidemia) E78.5    Obstructive sleep apnea syndrome G47.33    Panic attack F41.0    Controlled type 2 diabetes mellitus without complication, without long-term current use of insulin (HCC) E11.9    Primary osteoarthritis of both knees M17.0    Complex tear of lateral meniscus of right knee as current injury S83.271A    Complex tear of medial meniscus of right knee as current injury B35.087Q    Degenerative disc disease, cervical M50.30    Idiopathic chronic gout of multiple sites without tophus M1A. 09X0    Chronic GERD K21.9    Hemorrhoid K64.9    Numbness R20.0       Past Medical History:        Diagnosis Date    Arthritis     Chicken pox     Chronic GERD 7/19/2018    Diabetes mellitus ([facility] [address]     per H&P    Dizziness     CORTES (generalized anxiety disorder)     Gout     Hyperlipidemia     Obstructive sleep apnea syndrome 08/08/2016    doesn't use CPAP    Panic attacks     Rash        Past Surgical History:        Procedure Laterality Date    COLONOSCOPY      HAND SURGERY      KNEE ARTHROSCOPY Right 09/14/2017    RIGHT KNEE ARTHROSCOPY WITH PARTIAL MEDIAL AND LATERAL MENISCECTOMY,CHONDROPLASTY,SYNOVECTOMY    KNEE SURGERY      left meniscus    MENISCECTOMY Left     TONSILLECTOMY      WISDOM TOOTH EXTRACTION         Social History:    Social History   Substance Use Topics    Smoking status: Never Smoker    Smokeless tobacco: Never Used    Alcohol use No      Comment: rarely                                Counseling given: Not Answered      Vital Signs (Current):   Vitals:    09/10/18 1040   BP: (!) 138/92   Pulse: 86   Resp: 18   Temp: 98.1 °F (36.7 °C)   TempSrc: Oral   SpO2: 96%   Weight: (!) 330 lb (149.7 kg)   Height: 5' 8\" (1.727 m)                                              BP Readings from Last 3 Encounters:   09/10/18 (!) 138/92   08/29/18 118/72   08/23/18 (!) 143/93       NPO Status: Time of last liquid consumption: 2330                        Time of last solid consumption: 2300                        Date of last liquid consumption: 09/09/18                        Date of last solid food consumption: 09/09/18    BMI:   Wt Readings from Last 3 Encounters:   09/10/18 (!) 330 lb (149.7 kg)   08/29/18 (!) 340 lb 3.2 oz (154.3 kg)   08/23/18 (!) 338 lb 6.4 oz (153.5 kg)     Body mass index is 50.18 kg/m².     CBC:   Lab Results   Component Value Date    WBC 4.7

## 2018-09-10 NOTE — ANESTHESIA POSTPROCEDURE EVALUATION
5.0                 08/07/2018 03:19 PM        CL                       106                 08/07/2018 03:19 PM        CO2                      27                  08/07/2018 03:19 PM        BUN                      10                  08/07/2018 03:19 PM        CREATININE               1.0                 08/07/2018 03:19 PM        GLUCOSE                  90                  08/07/2018 03:19 PM   COAGS  No results found for: PROTIME, INR, APTT    Intake & Output:  @24HRIO@    Nausea & Vomiting:  No    Level of Consciousness:  Awake    Pain Assessment:  Adequate analgesia    Anesthesia Complications:  No apparent anesthetic complications    SUMMARY      Vital signs stable  OK to discharge from Stage I post anesthesia care.   Care transferred from Anesthesiology department on discharge from perioperative area

## 2018-09-13 RX ORDER — CLARITHROMYCIN 500 MG/1
500 TABLET, COATED ORAL 2 TIMES DAILY
Qty: 28 TABLET | Refills: 0 | Status: SHIPPED | OUTPATIENT
Start: 2018-09-13 | End: 2018-09-27

## 2018-09-13 RX ORDER — AMOXICILLIN 250 MG/1
500 CAPSULE ORAL 2 TIMES DAILY
Qty: 56 CAPSULE | Refills: 0 | Status: SHIPPED | OUTPATIENT
Start: 2018-09-13 | End: 2018-09-27

## 2018-09-13 RX ORDER — FAMOTIDINE 20 MG/1
20 TABLET, FILM COATED ORAL 2 TIMES DAILY
Qty: 28 TABLET | Refills: 0 | Status: SHIPPED | OUTPATIENT
Start: 2018-09-13 | End: 2018-11-26 | Stop reason: CLARIF

## 2018-11-03 ENCOUNTER — APPOINTMENT (OUTPATIENT)
Dept: GENERAL RADIOLOGY | Age: 53
End: 2018-11-03
Payer: MEDICAID

## 2018-11-03 ENCOUNTER — HOSPITAL ENCOUNTER (EMERGENCY)
Age: 53
Discharge: HOME | End: 2018-11-03
Attending: EMERGENCY MEDICINE
Payer: MEDICAID

## 2018-11-03 VITALS
RESPIRATION RATE: 20 BRPM | SYSTOLIC BLOOD PRESSURE: 164 MMHG | DIASTOLIC BLOOD PRESSURE: 90 MMHG | HEART RATE: 86 BPM | OXYGEN SATURATION: 95 % | TEMPERATURE: 98 F

## 2018-11-03 DIAGNOSIS — R07.81 RIB PAIN ON RIGHT SIDE: Primary | ICD-10-CM

## 2018-11-03 PROCEDURE — 6370000000 HC RX 637 (ALT 250 FOR IP)

## 2018-11-03 PROCEDURE — 99284 EMERGENCY DEPT VISIT MOD MDM: CPT

## 2018-11-03 PROCEDURE — 96372 THER/PROPH/DIAG INJ SC/IM: CPT

## 2018-11-03 PROCEDURE — 71100 X-RAY EXAM RIBS UNI 2 VIEWS: CPT

## 2018-11-03 PROCEDURE — 6360000002 HC RX W HCPCS: Performed by: EMERGENCY MEDICINE

## 2018-11-03 PROCEDURE — 71046 X-RAY EXAM CHEST 2 VIEWS: CPT

## 2018-11-03 RX ORDER — KETOROLAC TROMETHAMINE 30 MG/ML
15 INJECTION, SOLUTION INTRAMUSCULAR; INTRAVENOUS ONCE
Status: COMPLETED | OUTPATIENT
Start: 2018-11-03 | End: 2018-11-03

## 2018-11-03 RX ORDER — LIDOCAINE 50 MG/G
1 PATCH TOPICAL DAILY
Qty: 10 PATCH | Refills: 0 | Status: SHIPPED | OUTPATIENT
Start: 2018-11-03 | End: 2018-11-26 | Stop reason: CLARIF

## 2018-11-03 RX ORDER — LIDOCAINE 4 G/G
PATCH TOPICAL
Status: DISCONTINUED
Start: 2018-11-03 | End: 2018-11-04 | Stop reason: HOSPADM

## 2018-11-03 RX ORDER — LIDOCAINE 4 G/G
1 PATCH TOPICAL ONCE
Status: DISCONTINUED | OUTPATIENT
Start: 2018-11-03 | End: 2018-11-04 | Stop reason: HOSPADM

## 2018-11-03 RX ADMIN — KETOROLAC TROMETHAMINE 15 MG: 30 INJECTION, SOLUTION INTRAMUSCULAR at 21:40

## 2018-11-03 ASSESSMENT — PAIN SCALES - GENERAL
PAINLEVEL_OUTOF10: 7
PAINLEVEL_OUTOF10: 7

## 2018-11-03 ASSESSMENT — PAIN DESCRIPTION - ORIENTATION: ORIENTATION: RIGHT

## 2018-11-03 ASSESSMENT — PAIN DESCRIPTION - LOCATION: LOCATION: RIB CAGE

## 2018-11-03 ASSESSMENT — PAIN DESCRIPTION - PAIN TYPE: TYPE: ACUTE PAIN

## 2018-11-19 ENCOUNTER — OFFICE VISIT (OUTPATIENT)
Dept: CARDIOLOGY CLINIC | Age: 53
End: 2018-11-19
Payer: MEDICAID

## 2018-11-19 VITALS
BODY MASS INDEX: 50.94 KG/M2 | SYSTOLIC BLOOD PRESSURE: 122 MMHG | HEART RATE: 83 BPM | DIASTOLIC BLOOD PRESSURE: 88 MMHG | WEIGHT: 315 LBS

## 2018-11-19 DIAGNOSIS — Z01.810 PREOPERATIVE CARDIOVASCULAR EXAMINATION: ICD-10-CM

## 2018-11-19 DIAGNOSIS — E78.2 MIXED HYPERLIPIDEMIA: Primary | ICD-10-CM

## 2018-11-19 DIAGNOSIS — G47.33 OBSTRUCTIVE SLEEP APNEA SYNDROME: ICD-10-CM

## 2018-11-19 DIAGNOSIS — E78.5 HYPERLIPIDEMIA, UNSPECIFIED HYPERLIPIDEMIA TYPE: Chronic | ICD-10-CM

## 2018-11-19 PROCEDURE — 93000 ELECTROCARDIOGRAM COMPLETE: CPT | Performed by: INTERNAL MEDICINE

## 2018-11-19 PROCEDURE — G8427 DOCREV CUR MEDS BY ELIG CLIN: HCPCS | Performed by: INTERNAL MEDICINE

## 2018-11-19 PROCEDURE — 1036F TOBACCO NON-USER: CPT | Performed by: INTERNAL MEDICINE

## 2018-11-19 PROCEDURE — 3017F COLORECTAL CA SCREEN DOC REV: CPT | Performed by: INTERNAL MEDICINE

## 2018-11-19 PROCEDURE — G8417 CALC BMI ABV UP PARAM F/U: HCPCS | Performed by: INTERNAL MEDICINE

## 2018-11-19 PROCEDURE — 99203 OFFICE O/P NEW LOW 30 MIN: CPT | Performed by: INTERNAL MEDICINE

## 2018-11-19 PROCEDURE — G8484 FLU IMMUNIZE NO ADMIN: HCPCS | Performed by: INTERNAL MEDICINE

## 2018-11-19 ASSESSMENT — ENCOUNTER SYMPTOMS
GASTROINTESTINAL NEGATIVE: 1
ALLERGIC/IMMUNOLOGIC NEGATIVE: 1
EYES NEGATIVE: 1
RESPIRATORY NEGATIVE: 1

## 2018-11-19 NOTE — PROGRESS NOTES
Subjective:      Patient ID: Kim Salazar is a 46 y.o. male    CC: New pt, pre- op cardiac clearance     HPI:  Josefina Badillo is a 46year old male with HX of HPL, PARISH, and GERD. He is here today to establish care and for pre-op cardiac clearance for Laparoscopic Sleeve Gastrectomy. Has no chest pain no presyncope. No Known Allergies    Social History     Social History    Marital status: Single     Spouse name: N/A    Number of children: 2    Years of education: N/A     Occupational History    [employer]      Social History Main Topics    Smoking status: Never Smoker    Smokeless tobacco: Never Used    Alcohol use No      Comment: rarely    Drug use: No    Sexual activity: Yes     Partners: Female     Other Topics Concern    Not on file     Social History Narrative    No narrative on file       Family History   Problem Relation Age of Onset    Elevated Lipids Mother     High Cholesterol Mother    NO CAD in family and mom and dad.     has a past medical history of Arthritis; Chicken pox; Chronic GERD; Diabetes mellitus ([address] Dizziness; CORTES (generalized anxiety disorder); Gout; Hyperlipidemia; Obstructive sleep apnea syndrome; Panic attacks; and Rash. Review of Systems   Constitutional: Negative. HENT: Negative. Eyes: Negative. Respiratory: Negative. Cardiovascular: Negative. Gastrointestinal: Negative. Endocrine: Negative. Genitourinary: Negative. Musculoskeletal: Negative. Skin: Negative. Allergic/Immunologic: Negative. Neurological: Negative. Hematological: Negative. Psychiatric/Behavioral: Negative. Objective:   Physical Exam   Constitutional: He is oriented to person, place, and time. He appears well-developed and well-nourished. No distress. HENT:   Head: Normocephalic and atraumatic. Eyes: Pupils are equal, round, and reactive to light. EOM are normal. Right eye exhibits no discharge. Left eye exhibits no discharge.  No scleral

## 2018-11-26 ENCOUNTER — OFFICE VISIT (OUTPATIENT)
Dept: FAMILY MEDICINE CLINIC | Age: 53
End: 2018-11-26
Payer: MEDICAID

## 2018-11-26 VITALS
RESPIRATION RATE: 16 BRPM | BODY MASS INDEX: 49.44 KG/M2 | WEIGHT: 315 LBS | DIASTOLIC BLOOD PRESSURE: 75 MMHG | TEMPERATURE: 98 F | HEIGHT: 67 IN | SYSTOLIC BLOOD PRESSURE: 122 MMHG | OXYGEN SATURATION: 99 % | HEART RATE: 80 BPM

## 2018-11-26 DIAGNOSIS — Z28.21 PNEUMOCOCCAL VACCINATION DECLINED BY PATIENT: ICD-10-CM

## 2018-11-26 DIAGNOSIS — E55.9 VITAMIN D DEFICIENCY: ICD-10-CM

## 2018-11-26 DIAGNOSIS — E78.2 MIXED HYPERLIPIDEMIA: Chronic | ICD-10-CM

## 2018-11-26 DIAGNOSIS — E11.9 CONTROLLED TYPE 2 DIABETES MELLITUS WITHOUT COMPLICATION, WITHOUT LONG-TERM CURRENT USE OF INSULIN (HCC): ICD-10-CM

## 2018-11-26 DIAGNOSIS — K21.9 CHRONIC GERD: ICD-10-CM

## 2018-11-26 DIAGNOSIS — Z12.11 COLON CANCER SCREENING: ICD-10-CM

## 2018-11-26 DIAGNOSIS — M79.641 HAND PAIN, RIGHT: Primary | ICD-10-CM

## 2018-11-26 DIAGNOSIS — Z28.21 INFLUENZA VACCINATION DECLINED BY PATIENT: ICD-10-CM

## 2018-11-26 DIAGNOSIS — Z12.5 SCREENING PSA (PROSTATE SPECIFIC ANTIGEN): ICD-10-CM

## 2018-11-26 DIAGNOSIS — E66.01 MORBID OBESITY WITH BMI OF 50.0-59.9, ADULT (HCC): ICD-10-CM

## 2018-11-26 DIAGNOSIS — G47.33 OBSTRUCTIVE SLEEP APNEA SYNDROME: Chronic | ICD-10-CM

## 2018-11-26 PROCEDURE — 3017F COLORECTAL CA SCREEN DOC REV: CPT | Performed by: FAMILY MEDICINE

## 2018-11-26 PROCEDURE — G8417 CALC BMI ABV UP PARAM F/U: HCPCS | Performed by: FAMILY MEDICINE

## 2018-11-26 PROCEDURE — 2022F DILAT RTA XM EVC RTNOPTHY: CPT | Performed by: FAMILY MEDICINE

## 2018-11-26 PROCEDURE — G8427 DOCREV CUR MEDS BY ELIG CLIN: HCPCS | Performed by: FAMILY MEDICINE

## 2018-11-26 PROCEDURE — 3044F HG A1C LEVEL LT 7.0%: CPT | Performed by: FAMILY MEDICINE

## 2018-11-26 PROCEDURE — 1036F TOBACCO NON-USER: CPT | Performed by: FAMILY MEDICINE

## 2018-11-26 PROCEDURE — G8484 FLU IMMUNIZE NO ADMIN: HCPCS | Performed by: FAMILY MEDICINE

## 2018-11-26 PROCEDURE — 99214 OFFICE O/P EST MOD 30 MIN: CPT | Performed by: FAMILY MEDICINE

## 2018-11-26 ASSESSMENT — PATIENT HEALTH QUESTIONNAIRE - PHQ9
SUM OF ALL RESPONSES TO PHQ9 QUESTIONS 1 & 2: 0
2. FEELING DOWN, DEPRESSED OR HOPELESS: 0
SUM OF ALL RESPONSES TO PHQ QUESTIONS 1-9: 0
1. LITTLE INTEREST OR PLEASURE IN DOING THINGS: 0
SUM OF ALL RESPONSES TO PHQ QUESTIONS 1-9: 0

## 2018-11-26 ASSESSMENT — ENCOUNTER SYMPTOMS
ANAL BLEEDING: 0
SINUS PAIN: 0
NAUSEA: 0
CONSTIPATION: 0
COLOR CHANGE: 0
EYE PAIN: 0
VOMITING: 0
FACIAL SWELLING: 0
EYE DISCHARGE: 0
SORE THROAT: 0
ABDOMINAL PAIN: 0
ABDOMINAL DISTENTION: 0
SINUS PRESSURE: 0
SHORTNESS OF BREATH: 0
STRIDOR: 0
BACK PAIN: 0
PHOTOPHOBIA: 0
CHEST TIGHTNESS: 0
DIARRHEA: 0
BLOOD IN STOOL: 0
COUGH: 0
WHEEZING: 0
APNEA: 0
RHINORRHEA: 0
EYE ITCHING: 0
TROUBLE SWALLOWING: 0
RECTAL PAIN: 0
VOICE CHANGE: 0
CHOKING: 0
EYE REDNESS: 0

## 2018-11-26 NOTE — PROGRESS NOTES
difficulty urinating, discharge, dysuria, enuresis, flank pain, frequency, genital sores, hematuria, penile pain, penile swelling, scrotal swelling, testicular pain and urgency. Musculoskeletal: Positive for arthralgias. Negative for back pain, gait problem, joint swelling, myalgias, neck pain and neck stiffness. Skin: Negative for color change, pallor, rash and wound. Neurological: Negative for dizziness, tremors, seizures, syncope, facial asymmetry, speech difficulty, weakness, light-headedness, numbness and headaches. Negative for:Visual disturbance/muscular weakness/paralysis/memory problems. Hematological: Negative for adenopathy. Does not bruise/bleed easily. Negative for:Lymph node tenderness   Psychiatric/Behavioral: Negative for agitation, behavioral problems, confusion, decreased concentration, dysphoric mood, hallucinations, self-injury, sleep disturbance and suicidal ideas. The patient is not nervous/anxious and is not hyperactive. Negative for:Homicidal tendencies(HI)       Objective:   Physical Exam   Constitutional: He is oriented to person, place, and time. Vital signs are normal. He appears well-developed and well-nourished. He is cooperative. He does not have a sickly appearance. No distress. HENT:   Nose: Nose normal.   Mouth/Throat: Uvula is midline, oropharynx is clear and moist and mucous membranes are normal.   Hearing intact to nml conversation   Eyes: Pupils are equal, round, and reactive to light. Conjunctivae, EOM and lids are normal.   Neck: Trachea normal and normal range of motion. Neck supple. No JVD present. Carotid bruit is not present. Cardiovascular: Normal rate, regular rhythm, normal heart sounds, intact distal pulses and normal pulses. No murmur heard. No ankle edema. Pulmonary/Chest: Effort normal and breath sounds normal.   CTAB,good AE bilaterally   Abdominal: Soft.  Normal appearance and bowel sounds are normal. He exhibits no

## 2018-11-27 ENCOUNTER — HOSPITAL ENCOUNTER (OUTPATIENT)
Dept: NON INVASIVE DIAGNOSTICS | Age: 53
Discharge: HOME | End: 2018-11-27
Payer: MEDICAID

## 2018-11-27 ENCOUNTER — HOSPITAL ENCOUNTER (OUTPATIENT)
Dept: GENERAL RADIOLOGY | Age: 53
Discharge: HOME | End: 2018-11-27
Payer: MEDICAID

## 2018-11-27 DIAGNOSIS — M79.641 HAND PAIN, RIGHT: ICD-10-CM

## 2018-11-27 LAB
LV EF: 53 %
LVEF MODALITY: NORMAL

## 2018-11-27 PROCEDURE — 6360000004 HC RX CONTRAST MEDICATION: Performed by: INTERNAL MEDICINE

## 2018-11-27 PROCEDURE — 73130 X-RAY EXAM OF HAND: CPT

## 2018-11-27 PROCEDURE — C8929 TTE W OR WO FOL WCON,DOPPLER: HCPCS

## 2018-11-27 RX ADMIN — PERFLUTREN 2.2 MG: 6.52 INJECTION, SUSPENSION INTRAVENOUS at 16:28

## 2018-11-28 DIAGNOSIS — M79.641 RIGHT HAND PAIN: Primary | ICD-10-CM

## 2018-12-03 ENCOUNTER — OFFICE VISIT (OUTPATIENT)
Dept: ORTHOPEDIC SURGERY | Age: 53
End: 2018-12-03
Payer: MEDICAID

## 2018-12-03 ENCOUNTER — TELEPHONE (OUTPATIENT)
Dept: CARDIOLOGY CLINIC | Age: 53
End: 2018-12-03

## 2018-12-03 VITALS — BODY MASS INDEX: 49.44 KG/M2 | WEIGHT: 315 LBS | RESPIRATION RATE: 18 BRPM | HEIGHT: 67 IN

## 2018-12-03 DIAGNOSIS — M18.11 ARTHRITIS OF CARPOMETACARPAL (CMC) JOINT OF RIGHT THUMB: Primary | ICD-10-CM

## 2018-12-03 PROCEDURE — G8484 FLU IMMUNIZE NO ADMIN: HCPCS | Performed by: ORTHOPAEDIC SURGERY

## 2018-12-03 PROCEDURE — G8417 CALC BMI ABV UP PARAM F/U: HCPCS | Performed by: ORTHOPAEDIC SURGERY

## 2018-12-03 PROCEDURE — 1036F TOBACCO NON-USER: CPT | Performed by: ORTHOPAEDIC SURGERY

## 2018-12-03 PROCEDURE — 99213 OFFICE O/P EST LOW 20 MIN: CPT | Performed by: ORTHOPAEDIC SURGERY

## 2018-12-03 PROCEDURE — 3017F COLORECTAL CA SCREEN DOC REV: CPT | Performed by: ORTHOPAEDIC SURGERY

## 2018-12-03 PROCEDURE — G8427 DOCREV CUR MEDS BY ELIG CLIN: HCPCS | Performed by: ORTHOPAEDIC SURGERY

## 2018-12-03 NOTE — TELEPHONE ENCOUNTER
Called and notified pt per Dr. Issac Nino echo is ok and the pt is cleared from a cardiac standpoint and is considered to be an acceptable risk for their planned Laparoscopic surgical  procedure. Pt verbalized understanding.  Letter is in [address] [place] [address] Rd

## 2018-12-15 ENCOUNTER — HOSPITAL ENCOUNTER (EMERGENCY)
Age: 53
Discharge: HOME | End: 2018-12-16
Attending: EMERGENCY MEDICINE
Payer: MEDICAID

## 2018-12-15 VITALS
OXYGEN SATURATION: 95 % | SYSTOLIC BLOOD PRESSURE: 137 MMHG | DIASTOLIC BLOOD PRESSURE: 97 MMHG | TEMPERATURE: 98.6 F | HEART RATE: 85 BPM

## 2018-12-15 DIAGNOSIS — M25.531 WRIST PAIN, ACUTE, RIGHT: Primary | ICD-10-CM

## 2018-12-15 PROCEDURE — 99283 EMERGENCY DEPT VISIT LOW MDM: CPT

## 2018-12-15 RX ORDER — KETOROLAC TROMETHAMINE 30 MG/ML
30 INJECTION, SOLUTION INTRAMUSCULAR; INTRAVENOUS ONCE
Status: COMPLETED | OUTPATIENT
Start: 2018-12-16 | End: 2018-12-16

## 2018-12-15 RX ORDER — PREDNISONE 20 MG/1
60 TABLET ORAL ONCE
Status: COMPLETED | OUTPATIENT
Start: 2018-12-16 | End: 2018-12-16

## 2018-12-15 RX ORDER — METHYLPREDNISOLONE 4 MG/1
TABLET ORAL
Qty: 1 KIT | Refills: 0 | Status: SHIPPED | OUTPATIENT
Start: 2018-12-15 | End: 2018-12-21

## 2018-12-15 ASSESSMENT — PAIN DESCRIPTION - DESCRIPTORS: DESCRIPTORS: ACHING

## 2018-12-15 ASSESSMENT — PAIN SCALES - GENERAL: PAINLEVEL_OUTOF10: 10

## 2018-12-15 ASSESSMENT — PAIN DESCRIPTION - PAIN TYPE: TYPE: ACUTE PAIN

## 2018-12-15 ASSESSMENT — PAIN DESCRIPTION - LOCATION: LOCATION: WRIST

## 2018-12-15 ASSESSMENT — PAIN DESCRIPTION - ORIENTATION: ORIENTATION: RIGHT

## 2018-12-16 PROCEDURE — 96372 THER/PROPH/DIAG INJ SC/IM: CPT

## 2018-12-16 PROCEDURE — 6370000000 HC RX 637 (ALT 250 FOR IP): Performed by: STUDENT IN AN ORGANIZED HEALTH CARE EDUCATION/TRAINING PROGRAM

## 2018-12-16 PROCEDURE — 6360000002 HC RX W HCPCS: Performed by: STUDENT IN AN ORGANIZED HEALTH CARE EDUCATION/TRAINING PROGRAM

## 2018-12-16 RX ADMIN — KETOROLAC TROMETHAMINE 30 MG: 30 INJECTION, SOLUTION INTRAMUSCULAR at 00:01

## 2018-12-16 RX ADMIN — PREDNISONE 60 MG: 20 TABLET ORAL at 00:01

## 2018-12-16 ASSESSMENT — ENCOUNTER SYMPTOMS
COUGH: 0
CONSTIPATION: 0
ABDOMINAL PAIN: 0
VOMITING: 0
SHORTNESS OF BREATH: 0
DIARRHEA: 0
SORE THROAT: 0
PHOTOPHOBIA: 0
NAUSEA: 0
TROUBLE SWALLOWING: 0

## 2018-12-16 ASSESSMENT — PAIN SCALES - GENERAL: PAINLEVEL_OUTOF10: 8

## 2018-12-16 NOTE — ED PROVIDER NOTES
acute, right        Disposition     PATIENT REFERRED TO:  Talha Miles MD  [address]    In 3 days        DISCHARGE MEDICATIONS:  Discharge Medication List as of 12/16/2018 12:03 AM      START taking these medications    Details   methylPREDNISolone (MEDROL, MARLA,) 4 MG tablet Take by mouth., Disp-1 kit, R-0Print             DISPOSITION Decision To Discharge 12/15/2018 11:53:53 PM       Darion Moore MD PhD  Resident  12/16/18 [id number]

## 2019-04-08 ENCOUNTER — OFFICE VISIT (OUTPATIENT)
Dept: FAMILY MEDICINE CLINIC | Age: 54
End: 2019-04-08
Payer: MEDICAID

## 2019-04-08 VITALS
HEIGHT: 67 IN | RESPIRATION RATE: 16 BRPM | TEMPERATURE: 98 F | DIASTOLIC BLOOD PRESSURE: 81 MMHG | SYSTOLIC BLOOD PRESSURE: 125 MMHG | BODY MASS INDEX: 49.44 KG/M2 | HEART RATE: 84 BPM | OXYGEN SATURATION: 98 % | WEIGHT: 315 LBS

## 2019-04-08 DIAGNOSIS — M25.512 ACUTE PAIN OF LEFT SHOULDER: ICD-10-CM

## 2019-04-08 DIAGNOSIS — S76.212A GROIN STRAIN, LEFT, INITIAL ENCOUNTER: Primary | ICD-10-CM

## 2019-04-08 DIAGNOSIS — E55.9 VITAMIN D DEFICIENCY: ICD-10-CM

## 2019-04-08 DIAGNOSIS — E11.9 CONTROLLED TYPE 2 DIABETES MELLITUS WITHOUT COMPLICATION, WITHOUT LONG-TERM CURRENT USE OF INSULIN (HCC): ICD-10-CM

## 2019-04-08 DIAGNOSIS — Z12.5 SCREENING PSA (PROSTATE SPECIFIC ANTIGEN): ICD-10-CM

## 2019-04-08 DIAGNOSIS — Z12.11 COLON CANCER SCREENING: ICD-10-CM

## 2019-04-08 DIAGNOSIS — E78.2 MIXED HYPERLIPIDEMIA: ICD-10-CM

## 2019-04-08 DIAGNOSIS — E66.01 MORBID OBESITY WITH BMI OF 50.0-59.9, ADULT (HCC): ICD-10-CM

## 2019-04-08 DIAGNOSIS — R10.32 LEFT GROIN PAIN: ICD-10-CM

## 2019-04-08 PROCEDURE — G8417 CALC BMI ABV UP PARAM F/U: HCPCS | Performed by: FAMILY MEDICINE

## 2019-04-08 PROCEDURE — 1036F TOBACCO NON-USER: CPT | Performed by: FAMILY MEDICINE

## 2019-04-08 PROCEDURE — 3017F COLORECTAL CA SCREEN DOC REV: CPT | Performed by: FAMILY MEDICINE

## 2019-04-08 PROCEDURE — 3046F HEMOGLOBIN A1C LEVEL >9.0%: CPT | Performed by: FAMILY MEDICINE

## 2019-04-08 PROCEDURE — G8427 DOCREV CUR MEDS BY ELIG CLIN: HCPCS | Performed by: FAMILY MEDICINE

## 2019-04-08 PROCEDURE — 99214 OFFICE O/P EST MOD 30 MIN: CPT | Performed by: FAMILY MEDICINE

## 2019-04-08 PROCEDURE — 2022F DILAT RTA XM EVC RTNOPTHY: CPT | Performed by: FAMILY MEDICINE

## 2019-04-08 RX ORDER — NAPROXEN 500 MG/1
500 TABLET ORAL 2 TIMES DAILY WITH MEALS
Qty: 30 TABLET | Refills: 0 | Status: SHIPPED | OUTPATIENT
Start: 2019-04-08 | End: 2019-09-30 | Stop reason: CLARIF

## 2019-04-08 ASSESSMENT — ENCOUNTER SYMPTOMS
COUGH: 0
STRIDOR: 0
EYES NEGATIVE: 1
ABDOMINAL PAIN: 0
SHORTNESS OF BREATH: 0
RECTAL PAIN: 0
DIARRHEA: 0
WHEEZING: 0
VOMITING: 0
CHOKING: 0
BLOOD IN STOOL: 0
ABDOMINAL DISTENTION: 0
CHEST TIGHTNESS: 0
APNEA: 0
ANAL BLEEDING: 0
NAUSEA: 0
CONSTIPATION: 0

## 2019-04-08 ASSESSMENT — PATIENT HEALTH QUESTIONNAIRE - PHQ9
SUM OF ALL RESPONSES TO PHQ QUESTIONS 1-9: 0
2. FEELING DOWN, DEPRESSED OR HOPELESS: 0
SUM OF ALL RESPONSES TO PHQ QUESTIONS 1-9: 0
1. LITTLE INTEREST OR PLEASURE IN DOING THINGS: 0
SUM OF ALL RESPONSES TO PHQ9 QUESTIONS 1 & 2: 0

## 2019-04-08 NOTE — PATIENT INSTRUCTIONS
Patient Education        Groin Strain: Care Instructions  Your Care Instructions  A groin strain is an injury that happens when you tear or overstretch (pull) a groin muscle. The groin muscles are in the area on either side of the body in the folds where the belly joins the legs. You can strain a groin muscle during exercise, such as running, skating, kicking in soccer, or playing basketball. It can happen when you lift, push, or pull heavy objects. You might pull a groin muscle when you fall. The injury can range from a minor pull to a more serious tear of the muscle. You may feel pain and tenderness that's worse when you squeeze your legs together. You may also have pain when you raise the knee of the injured side. There may be swelling or bruising in the groin area or inner thigh. If you have a bad strain, you may walk with a limp while it heals. Rest and other home care can help the muscle heal. Healing can take up to 3 weeks or more. Your doctor may want to see you again in 2 to 3 weeks. Follow-up care is a key part of your treatment and safety. Be sure to make and go to all appointments, and call your doctor if you are having problems. It's also a good idea to know your test results and keep a list of the medicines you take. How can you care for yourself at home? · Be safe with medicines. Read and follow all instructions on the label. ? If the doctor gave you a prescription medicine for pain, take it as prescribed. ? If you are not taking a prescription pain medicine, ask your doctor if you can take an over-the-counter medicine. · Rest and protect your injured or sore groin area for 1 to 2 weeks. Stop, change, or take a break from any activity that may be causing your pain or soreness. Do not do intense activities while you still have pain. · Put ice or a cold pack on your groin area for 10 to 20 minutes at a time.  Try to do this every 1 to 2 hours for the next 3 days (when you are awake) or until the swelling goes down. Put a thin cloth between the ice and your skin. · After 2 or 3 days, if your swelling is gone, apply heat. Put a warm water bottle, a heating pad set on low, or a warm cloth on your groin area. Do not go to sleep with a heating pad on your skin. · If your doctor gave you crutches, make sure you use them as directed. · Wear snug shorts or underwear that support the injured area. When should you call for help? Call your doctor now or seek immediate medical care if:    · You have new or severe pain or swelling in the groin area.     · Your groin or upper thigh is cool or pale or changes color.     · You have tingling, weakness, or numbness in your groin or leg.     · You cannot move your leg.     · You cannot put weight on your leg.    Watch closely for changes in your health, and be sure to contact your doctor if:    · You do not get better as expected. Where can you learn more? Go to https://Adaptive Payments.Friendemic. org and sign in to your [employer] account. Enter J059 in the ReefEdge box to learn more about \"Groin Strain: Care Instructions. \"     If you do not have an account, please click on the \"Sign Up Now\" link. Current as of: September 20, 2018  Content Version: 11.9  © 4843-2348 [employer], Incorporated. Care instructions adapted under license by [facility] ([facility]). If you have questions about a medical condition or this instruction, always ask your healthcare professional. [place] [address] any warranty or liability for your use of this information. Patient Education        Shoulder Stretches: Exercises  Your Care Instructions  Here are some examples of exercises for your shoulder. Start each exercise slowly. Ease off the exercise if you start to have pain. Your doctor or physical therapist will tell you when you can start these exercises and which ones will work best for you.   How to do the exercises  Shoulder stretch    1.  a doorway and place one arm against the door frame. Your elbow should be a little higher than your shoulder. 2. Relax your shoulders as you lean forward, allowing your chest and shoulder muscles to stretch. You can also turn your body slightly away from your arm to stretch the muscles even more. 3. Hold for 15 to 30 seconds. 4. Repeat 2 to 4 times with each arm. Shoulder and chest stretch    1. Shoulder and chest stretch  2. While sitting, relax your upper body so you slump slightly in your chair. 3. As you breathe in, straighten your back and open your arms out to the sides. 4. Gently pull your shoulder blades back and downward. 5. Hold for 15 to 30 seconds as your breathe normally. 6. Repeat 2 to 4 times. Overhead stretch    1. Reach up over your head with both arms. 2. Hold for 15 to 30 seconds. 3. Repeat 2 to 4 times. Follow-up care is a key part of your treatment and safety. Be sure to make and go to all appointments, and call your doctor if you are having problems. It's also a good idea to know your test results and keep a list of the medicines you take. Where can you learn more? Go to https://doForms.Exo Labs. org and sign in to your [employer] account. Enter S254 in the SkillSurvey box to learn more about \"Shoulder Stretches: Exercises. \"     If you do not have an account, please click on the \"Sign Up Now\" link. Current as of: September 20, 2018  Content Version: 11.9  © 9131-3925 arGEN[employer]X. Care instructions adapted under license by [facility] ([facility]). If you have questions about a medical condition or this instruction, always ask your healthcare professional. [place] [address] any warranty or liability for your use of this information. Patient Education        Groin Strain: Care Instructions  Your Care Instructions  A groin strain is an injury that happens when you tear or overstretch (pull) a groin muscle.  The groin muscles are in the area on either side of the body in the folds where the belly joins the legs. You can strain a groin muscle during exercise, such as running, skating, kicking in soccer, or playing basketball. It can happen when you lift, push, or pull heavy objects. You might pull a groin muscle when you fall. The injury can range from a minor pull to a more serious tear of the muscle. You may feel pain and tenderness that's worse when you squeeze your legs together. You may also have pain when you raise the knee of the injured side. There may be swelling or bruising in the groin area or inner thigh. If you have a bad strain, you may walk with a limp while it heals. Rest and other home care can help the muscle heal. Healing can take up to 3 weeks or more. Your doctor may want to see you again in 2 to 3 weeks. Follow-up care is a key part of your treatment and safety. Be sure to make and go to all appointments, and call your doctor if you are having problems. It's also a good idea to know your test results and keep a list of the medicines you take. How can you care for yourself at home? · Be safe with medicines. Read and follow all instructions on the label. ? If the doctor gave you a prescription medicine for pain, take it as prescribed. ? If you are not taking a prescription pain medicine, ask your doctor if you can take an over-the-counter medicine. · Rest and protect your injured or sore groin area for 1 to 2 weeks. Stop, change, or take a break from any activity that may be causing your pain or soreness. Do not do intense activities while you still have pain. · Put ice or a cold pack on your groin area for 10 to 20 minutes at a time. Try to do this every 1 to 2 hours for the next 3 days (when you are awake) or until the swelling goes down. Put a thin cloth between the ice and your skin. · After 2 or 3 days, if your swelling is gone, apply heat.  Put a warm water bottle, a heating pad set on low, or a warm cloth on your groin area. Do not go to sleep with a heating pad on your skin. · If your doctor gave you crutches, make sure you use them as directed. · Wear snug shorts or underwear that support the injured area. When should you call for help? Call your doctor now or seek immediate medical care if:    · You have new or severe pain or swelling in the groin area.     · Your groin or upper thigh is cool or pale or changes color.     · You have tingling, weakness, or numbness in your groin or leg.     · You cannot move your leg.     · You cannot put weight on your leg.    Watch closely for changes in your health, and be sure to contact your doctor if:    · You do not get better as expected. Where can you learn more? Go to https://Buddy.Nodality. org and sign in to your [employer] account. Enter E900 in the NephroPlus box to learn more about \"Groin Strain: Care Instructions. \"     If you do not have an account, please click on the \"Sign Up Now\" link. Current as of: September 20, 2018  Content Version: 11.9  © 2427-5865 pic5, Incorporated. Care instructions adapted under license by [facility] ([facility]). If you have questions about a medical condition or this instruction, always ask your healthcare professional. [address] any warranty or liability for your use of this information.

## 2019-04-08 NOTE — PROGRESS NOTES
Subjective:      Patient ID: Octavia Pichardo is a 46 y.o. male. HPI  Presenting w/new complaint of left sided sharp groin pain x 2weeks. Preceding injury:no but was coughing approx 2weeks ago due to uri that triggered this. Pearlene Lo is better. No groin mass felt. Associated w/nothing else. Worsening factors:walking/new recent exercise regime. Improving factor:otc advil helps. Denies testicular or scrotal swelling,redness/vomiting/fever/chills/unexplained weight loss/diarrhea/constipation/blood in stool/tarry stool/appetite changes/n-v/rash/diarrhea/abdo pain. Presenting w/new complaint of left shoulder pain x 4weeks described as mild sharp pain. Preceding injury:no  Associated w/nothing else. Worsened(aggravated) by shoulder ROM & abduction. +night time shoulder pain. Improves with otc advil. Denies BUE weakness-paralysis/skin wounds-ulcers-lesions. No Known Allergies    No current outpatient medications on file prior to visit. No current facility-administered medications on file prior to visit. Past Medical History:   Diagnosis Date    Arthritis     Chicken pox     Chronic GERD 7/19/2018    Diabetes mellitus ([facility] [address]     per H&P    Dizziness     CORTES (generalized anxiety disorder)     Gout     Hyperlipidemia     Obstructive sleep apnea syndrome 08/08/2016    doesn't use CPAP    Panic attacks     Rash     Vitamin D deficiency 8/3/2015           Social History     Tobacco Use    Smoking status: Never Smoker    Smokeless tobacco: Never Used   Substance Use Topics    Alcohol use: No     Alcohol/week: 0.0 oz     Comment: rarely    Drug use: No     Social History     Substance and Sexual Activity   Drug Use No           Review of Systems   Constitutional: Negative for activity change, appetite change, chills, diaphoresis, fatigue, fever and unexpected weight change. HENT: Negative. Eyes: Negative. Negative for visual disturbance.    Respiratory: Negative for apnea, cough, choking, chest tightness, shortness of breath, wheezing and stridor. Cardiovascular: Negative for chest pain, palpitations and leg swelling. Gastrointestinal: Negative for abdominal distention, abdominal pain, anal bleeding, blood in stool, constipation, diarrhea, nausea, rectal pain and vomiting. Endocrine: Negative for cold intolerance, heat intolerance, polydipsia, polyphagia and polyuria. Genitourinary: Negative. Musculoskeletal: Positive for arthralgias. Skin: Negative for rash and wound. Neurological: Negative. Negative for dizziness and light-headedness. Hematological: Negative for adenopathy. Objective:   Physical Exam   Constitutional: He is oriented to person, place, and time. Vital signs are normal. He appears well-developed and well-nourished. He is cooperative. He does not have a sickly appearance. No distress. HENT:   Nose: Nose normal.   Mouth/Throat: Uvula is midline, oropharynx is clear and moist and mucous membranes are normal. No oropharyngeal exudate. Hearing intact to nml conversation   Eyes: Pupils are equal, round, and reactive to light. Conjunctivae, EOM and lids are normal.   Neck: Trachea normal and normal range of motion. Neck supple. Cardiovascular: Normal rate, regular rhythm, normal heart sounds, intact distal pulses and normal pulses. Pulmonary/Chest: Effort normal and breath sounds normal.   CTAB,good AE bilaterally   Abdominal: Soft. Normal appearance and bowel sounds are normal. He exhibits no distension and no mass. There is no splenomegaly or hepatomegaly. There is no tenderness. There is no rebound and no guarding. Hernia confirmed negative in the right inguinal area and confirmed negative in the left inguinal area. Genitourinary: Testes normal and penis normal. Cremasteric reflex is present. Musculoskeletal:        Right shoulder: Normal.        Left shoulder: He exhibits tenderness.         Right elbow: Normal.       Left elbow: Normal. Right wrist: Normal.        Left wrist: Normal.        Right hip: Normal.        Left hip: Normal.        Right knee: Normal.        Left knee: Normal.        Right ankle: Normal. Achilles tendon normal.        Left ankle: Normal. Achilles tendon normal.        Cervical back: Normal.        Right upper arm: Normal.        Left upper arm: Normal.        Right forearm: Normal.        Left forearm: Normal.        Arms:       Right hand: Normal.        Left hand: Normal.        Right upper leg: Normal.        Left upper leg: Normal.        Right lower leg: Normal.        Left lower leg: Normal.        Right foot: Normal.        Left foot: Normal.   Bilateral feet:+DPA/PTA(2+). 10g monofilament testing=wnl. Vibratory testing=wnl. Nml neurosensory-motor testing. No skin lesions. Nml nails. Capillary refill=2-3secs. Left groin is location of reported pain:no TTP. BLE & BUE:nml neurosensory-motor-vascular exam.     Lymphadenopathy:     He has no cervical adenopathy. No inguinal adenopathy noted on the right or left side. Right: No inguinal adenopathy present. Left: No inguinal adenopathy present. Neurological: He is alert and oriented to person, place, and time. He has normal reflexes. Skin: Skin is warm, dry and intact. Capillary refill takes less than 2 seconds. No rash noted. Good skin turgor. Capillary refill=2-3 secs. Psychiatric: He has a normal mood and affect. Assessment:       Diagnosis Orders   1. Groin strain, left, initial encounter  VSS/well appearing. Likely groin strain explaining his groin pain. Has been exercising-walking:advised to avoid exercising until improved. Rest/NSAIDs/ice-heat pad. Possible med side effects reviewed. Pt' wishes to proceed w/med      naproxen (NAPROSYN) 500 MG tablet   2. Acute pain of left shoulder  Rest/NSAIDs/ice-heat pad. Xray eval if not improving. naproxen (NAPROSYN) 500 MG tablet    XR SHOULDER LEFT (MIN 2 VIEWS)   3.  Controlled type 2 diabetes mellitus without complication, without long-term current use of insulin (HCC)  Stable. A1c due. Diabetes:Check feet daily. Yrly eye checks advised. Education: Reviewed ABCs of diabetes management (respective goals in parentheses):  A1C (<7), blood pressure (<130/80), and cholesterol (LDL <100). Counseled at this visit on the following: diabetes complication prevention, foot care. 4. Mixed hyperlipidemia  Labs due. 5. Vitamin D deficiency  Lab due. 6. Colon cancer screening  Colonoscopy report:pt' to release record to verify f/u testing. Pt' aware this is for colon cancer testing & importance of following GI re-testing recommendations. 7. Morbid obesity with BMI of 50.0-59.9, adult ([facility] [address]  Diet regime reviewed. 8. Screening PSA (prostate specific antigen)  Pt' has lab order. 9. Left groin pain  See note#1.  naproxen (NAPROSYN) 500 MG tablet             Plan:      Pt' left office in good condition. Call or return to clinic prn if these symptoms worsen or fail to improve as anticipated. Advised to go to local ER or call 911 for any worrisome signs/sx including but not limited to worsening of current complaints. Obtain labs/diagnostic tests as discussed today & call back for results within 2-7days.       Francisco Christian MD

## 2019-06-07 DIAGNOSIS — M1A.09X0 IDIOPATHIC CHRONIC GOUT OF MULTIPLE SITES WITHOUT TOPHUS: ICD-10-CM

## 2019-06-07 RX ORDER — ALLOPURINOL 300 MG/1
300 TABLET ORAL DAILY
Qty: 90 TABLET | Refills: 0 | Status: SHIPPED | OUTPATIENT
Start: 2019-06-07 | End: 2019-08-19

## 2019-07-12 ENCOUNTER — OFFICE VISIT (OUTPATIENT)
Dept: INTERNAL MEDICINE CLINIC | Age: 54
End: 2019-07-12
Payer: MEDICAID

## 2019-07-12 VITALS
WEIGHT: 315 LBS | DIASTOLIC BLOOD PRESSURE: 70 MMHG | HEART RATE: 104 BPM | OXYGEN SATURATION: 95 % | BODY MASS INDEX: 52.47 KG/M2 | SYSTOLIC BLOOD PRESSURE: 130 MMHG

## 2019-07-12 DIAGNOSIS — R59.0 ENLARGED LYMPH NODE IN NECK: Primary | ICD-10-CM

## 2019-07-12 DIAGNOSIS — R10.32 GROIN PAIN, LEFT: ICD-10-CM

## 2019-07-12 DIAGNOSIS — R59.9 ENLARGED LYMPH NODE: ICD-10-CM

## 2019-07-12 PROCEDURE — G8427 DOCREV CUR MEDS BY ELIG CLIN: HCPCS | Performed by: NURSE PRACTITIONER

## 2019-07-12 PROCEDURE — 99213 OFFICE O/P EST LOW 20 MIN: CPT | Performed by: NURSE PRACTITIONER

## 2019-07-12 PROCEDURE — 3017F COLORECTAL CA SCREEN DOC REV: CPT | Performed by: NURSE PRACTITIONER

## 2019-07-12 PROCEDURE — G8417 CALC BMI ABV UP PARAM F/U: HCPCS | Performed by: NURSE PRACTITIONER

## 2019-07-12 PROCEDURE — 1036F TOBACCO NON-USER: CPT | Performed by: NURSE PRACTITIONER

## 2019-07-12 RX ORDER — CYCLOBENZAPRINE HCL 10 MG
10 TABLET ORAL NIGHTLY PRN
Qty: 30 TABLET | Refills: 0 | Status: SHIPPED | OUTPATIENT
Start: 2019-07-12 | End: 2019-07-22

## 2019-07-12 ASSESSMENT — ENCOUNTER SYMPTOMS
DIARRHEA: 0
ABDOMINAL PAIN: 0
SINUS PRESSURE: 0
BACK PAIN: 0
SORE THROAT: 0
WHEEZING: 0
SHORTNESS OF BREATH: 0
SINUS PAIN: 0
CONSTIPATION: 0
COLOR CHANGE: 0
COUGH: 0

## 2019-07-12 NOTE — ASSESSMENT & PLAN NOTE
Firm, non tender nodule noted  Not associated with illness or infection   Obtain CT of neck to evaluate

## 2019-07-12 NOTE — PROGRESS NOTES
Subjective:      Patient ID: Sergey Finch is a 53 y.o. male. Chief Complaint   Patient presents with    Leg Pain    Cyst     thinks he has a swollen lymph node     HPI  Hali Marshall is here for concern of a bump on his neck. First noticed a bump about a week ago. He had a small pimple after shaving adjacent to this bump which has since resolved. He though originally that his lymph node was swollen because of the pimple but it has not gone away. He denies fevers, sinus drainage, cough, or URI. Has not increased or decreased in size. He is unsure if the nodule was there prior to one week ago. He is also having some leg pain in his left upper thigh. He states it feels like a pulled muscle. He has been exercising more trying to lose weight. He was given naproxen by his PCP but this has not helped. He has not tried anything else. Denies testicular pain or swelling. Review of Systems   Constitutional: Negative for chills, fatigue and fever. HENT: Negative for congestion, ear discharge, ear pain, postnasal drip, sinus pressure, sinus pain, sneezing and sore throat. Respiratory: Negative for cough, shortness of breath and wheezing. Cardiovascular: Negative for chest pain and palpitations. Gastrointestinal: Negative for abdominal pain, constipation and diarrhea. Musculoskeletal: Positive for myalgias (right thigh). Negative for arthralgias and back pain. Skin: Negative for color change, pallor and rash. Neurological: Negative for dizziness, syncope, weakness, light-headedness and headaches. Hematological: Positive for adenopathy (right neck ). Does not bruise/bleed easily. Psychiatric/Behavioral: Negative for behavioral problems, confusion and sleep disturbance. The patient is not nervous/anxious. Objective:   Physical Exam   Constitutional: He is oriented to person, place, and time. He appears well-developed and well-nourished. HENT:   Head: Normocephalic and atraumatic.

## 2019-07-13 DIAGNOSIS — E78.2 MIXED HYPERLIPIDEMIA: Chronic | ICD-10-CM

## 2019-07-13 DIAGNOSIS — E55.9 VITAMIN D DEFICIENCY: ICD-10-CM

## 2019-07-13 DIAGNOSIS — Z12.5 SCREENING PSA (PROSTATE SPECIFIC ANTIGEN): ICD-10-CM

## 2019-07-13 DIAGNOSIS — E11.9 CONTROLLED TYPE 2 DIABETES MELLITUS WITHOUT COMPLICATION, WITHOUT LONG-TERM CURRENT USE OF INSULIN (HCC): ICD-10-CM

## 2019-07-13 LAB
A/G RATIO: 1.8 (ref 1.1–2.2)
ALBUMIN SERPL-MCNC: 4.3 G/DL (ref 3.4–5)
ALP BLD-CCNC: 68 U/L (ref 40–129)
ALT SERPL-CCNC: 29 U/L (ref 10–40)
ANION GAP SERPL CALCULATED.3IONS-SCNC: 12 MMOL/L (ref 3–16)
AST SERPL-CCNC: 25 U/L (ref 15–37)
BILIRUB SERPL-MCNC: 0.6 MG/DL (ref 0–1)
BUN BLDV-MCNC: 10 MG/DL (ref 7–20)
CALCIUM SERPL-MCNC: 9.7 MG/DL (ref 8.3–10.6)
CHLORIDE BLD-SCNC: 105 MMOL/L (ref 99–110)
CHOLESTEROL, TOTAL: 205 MG/DL (ref 0–199)
CO2: 25 MMOL/L (ref 21–32)
CREAT SERPL-MCNC: 0.9 MG/DL (ref 0.9–1.3)
CREATININE URINE: 116.2 MG/DL (ref 39–259)
GFR AFRICAN AMERICAN: >60
GFR NON-AFRICAN AMERICAN: >60
GLOBULIN: 2.4 G/DL
GLUCOSE BLD-MCNC: 98 MG/DL (ref 70–99)
HDLC SERPL-MCNC: 45 MG/DL (ref 40–60)
LDL CHOLESTEROL CALCULATED: 133 MG/DL
MICROALBUMIN UR-MCNC: <1.2 MG/DL
MICROALBUMIN/CREAT UR-RTO: NORMAL MG/G (ref 0–30)
POTASSIUM SERPL-SCNC: 4.7 MMOL/L (ref 3.5–5.1)
PROSTATE SPECIFIC ANTIGEN: 0.6 NG/ML (ref 0–4)
SODIUM BLD-SCNC: 142 MMOL/L (ref 136–145)
TOTAL PROTEIN: 6.7 G/DL (ref 6.4–8.2)
TRIGL SERPL-MCNC: 133 MG/DL (ref 0–150)
VITAMIN D 25-HYDROXY: 24.4 NG/ML
VLDLC SERPL CALC-MCNC: 27 MG/DL

## 2019-07-14 LAB
ESTIMATED AVERAGE GLUCOSE: 128.4 MG/DL
HBA1C MFR BLD: 6.1 %

## 2019-07-18 ENCOUNTER — TELEPHONE (OUTPATIENT)
Dept: INTERNAL MEDICINE CLINIC | Age: 54
End: 2019-07-18

## 2019-07-18 DIAGNOSIS — R59.9 ENLARGED LYMPH NODE: Primary | ICD-10-CM

## 2019-07-19 ENCOUNTER — HOSPITAL ENCOUNTER (OUTPATIENT)
Dept: CT IMAGING | Age: 54
Discharge: HOME | End: 2019-07-19
Payer: MEDICAID

## 2019-07-19 DIAGNOSIS — R59.9 ENLARGED LYMPH NODE: ICD-10-CM

## 2019-07-19 PROCEDURE — 6360000004 HC RX CONTRAST MEDICATION: Performed by: FAMILY MEDICINE

## 2019-07-19 PROCEDURE — 70491 CT SOFT TISSUE NECK W/DYE: CPT

## 2019-07-19 RX ADMIN — IOPAMIDOL 80 ML: 755 INJECTION, SOLUTION INTRAVENOUS at 12:59

## 2019-07-24 ENCOUNTER — PATIENT MESSAGE (OUTPATIENT)
Dept: FAMILY MEDICINE CLINIC | Age: 54
End: 2019-07-24

## 2019-07-24 DIAGNOSIS — S76.212A GROIN STRAIN, LEFT, INITIAL ENCOUNTER: Primary | ICD-10-CM

## 2019-08-05 ENCOUNTER — HOSPITAL ENCOUNTER (OUTPATIENT)
Dept: PHYSICAL THERAPY | Age: 54
Setting detail: THERAPIES SERIES
Discharge: HOME | End: 2019-08-05
Payer: MEDICAID

## 2019-08-05 PROCEDURE — 97530 THERAPEUTIC ACTIVITIES: CPT

## 2019-08-05 PROCEDURE — 97161 PT EVAL LOW COMPLEX 20 MIN: CPT

## 2019-08-05 ASSESSMENT — PAIN DESCRIPTION - DESCRIPTORS: DESCRIPTORS: ACHING;DISCOMFORT;DULL;NAGGING

## 2019-08-05 ASSESSMENT — PAIN DESCRIPTION - LOCATION: LOCATION: HIP

## 2019-08-05 ASSESSMENT — PAIN DESCRIPTION - ORIENTATION: ORIENTATION: LEFT

## 2019-08-05 ASSESSMENT — PAIN DESCRIPTION - ONSET: ONSET: ON-GOING

## 2019-08-05 ASSESSMENT — PAIN DESCRIPTION - PAIN TYPE: TYPE: CHRONIC PAIN

## 2019-08-05 ASSESSMENT — PAIN DESCRIPTION - FREQUENCY: FREQUENCY: CONTINUOUS

## 2019-08-05 ASSESSMENT — PAIN SCALES - GENERAL: PAINLEVEL_OUTOF10: 3

## 2019-08-05 NOTE — PROGRESS NOTES
Limits    Social/Functional History  Social/Functional History  Lives With: Alone  Type of Home: House  Home Layout: Two level  Home Access: Stairs to enter with rails  Entrance Stairs - Number of Steps: 12  Entrance Stairs - Rails: None  Bathroom Shower/Tub: Tub/Shower unit  Bathroom Toilet: Standard  Home Equipment: Quad cane  Receives Help From: Friend(s)  ADL Assistance: Independent  Homemaking Assistance: Independent  Ambulation Assistance: Independent  Transfer Assistance: Independent  Active : Yes  Mode of Transportation: Car  Occupation: Unemployed    Objective   Observation/Palpation  Posture: Fair  Palpation: unable to palpate location of pain in left anterior medial hip, pain present with stretch into hip abduction or feels pinch with hip adduction  Observation: patient has classification of morbidly obesity, limitations present secondary to physical presentation    AROM RLE (degrees)  RLE AROM: WFL  RLE General AROM: relatively full ROM secondary to excessive adipose tissue  AROM LLE (degrees)  LLE AROM : WFL  LLE General AROM: pain with overpressure into hip abduction and adduction with knee flexed - relatively full ROM secondary to excessive adipose tissue  Spine  Lumbar: relatively full ROM - pain in L hip with R trunk SB and Rotation    Strength RLE  Strength RLE: WFL  Comment: grossly 5/5  Strength LLE  Strength LLE: WFL  Comment: pain present in L groin with hip flexion and IR/ER testing, no loss of strength; grossly 5/5        Sensation  Overall Sensation Status: WNL     Transfers  Sit to Stand: Independent  Stand to sit:  Independent  Car Transfer: Modified independent(uses UEs to lift L LE into car)  Comment: 30s sit<>stand = 13 (uses momentum to perform continual standing)       Ambulation  Ambulation?: Yes  Ambulation 1  Surface: level tile  Device: No Device  Assistance: Independent  Quality of Gait: antlagic gait with trunk shift during L LE stance, due to increased weight pt has FOLLOW UP: Yes  Activity Tolerance  Activity Tolerance: Patient Tolerated treatment well;Patient limited by pain         Plan   Plan  Times per week: 2x  Plan weeks: 6 weeks  Specific instructions for Next Treatment: initiate aquatic therapy  Current Treatment Recommendations: Strengthening, Gait Training, Manual Therapy - Joint Manipulation, Patient/Caregiver Education & Training, Aquatics, ROM, Stair training, Balance Training, Pain Management, Neuromuscular Re-education, Modalities, Functional Mobility Training, Endurance Training, Manual Therapy - Soft Tissue Mobilization, Positioning, Home Exercise Program, Transfer Training    OutComes Score  LEFS Total Score: 32 (08/05/19 1815)                Goals  Short term goals  Time Frame for Short term goals: 2 weeks   Short term goal 1: Independent in HEP and progression per patient tolerance, in order to prevent re-injury  Long term goals  Time Frame for Long term goals : 6 weeks / Discharge  Long term goal 1: Patient will demonstrate increased L LE AROM to full ROM without pain or compensations to allow for proper joint functioning as indicated by patients Functional Deficits. Long term goal 2: Patient will return to functional activities including community navigation for 60 minutes without increased symptoms or restriction. Long term goal 3: Patient will perform stairs with reciprocal pattern with good eccentric control for lowering foot to step without compensations or pain.    Patient Goals   Patient goals : weight loss and diminished pain       Therapy Time   Individual Concurrent Group Co-treatment   Time In 7477         Time Out 1755         Minutes 43         Timed Code Treatment Minutes: [address] Espinoza PT, DPT

## 2019-08-05 NOTE — FLOWSHEET NOTE
Modalities:  [] (08879) Vasopneumatic compression: Utilized vasopneumatic compression to decrease edema / swelling for the purpose of improving mobility and quad tone / recruitment which will allow for increased overall function including but not limited to self-care, transfers, ambulation, and ascending / descending stairs. Modalities:      Charges:  Timed Code Treatment Minutes: 30   Total Treatment Minutes: 43     [x] EVAL - LOW (96107)   [] EVAL - MOD (84428)  [] EVAL - HIGH (66056)  [] RE-EVAL (52799)  [] TE (82241) x      [] Ionto  [] NMR (94052) x      [] Vaso  [] Manual (93492) x      [] Ultrasound  [x] TA x 2       [] Mech Traction (54152)  [] Gait Training x     [] ES (un) (04459):   [] Aquatic therapy x   [] Other:   [] Group:     GOALS:   Short term goals  Time Frame for Short term goals: 2 weeks   Short term goal 1: Independent in HEP and progression per patient tolerance, in order to prevent re-injury    Long term goals  Time Frame for Long term goals : 6 weeks / Discharge  Long term goal 1: Patient will demonstrate increased L LE AROM to full ROM without pain or compensations to allow for proper joint functioning as indicated by patients Functional Deficits. Long term goal 2: Patient will return to functional activities including community navigation for 60 minutes without increased symptoms or restriction. Long term goal 3: Patient will perform stairs with reciprocal pattern with good eccentric control for lowering foot to step without compensations or pain. Patient Goals   Patient goals : weight loss and diminished pain    Progression Towards Functional goals:  [] Patient is progressing as expected towards functional goals listed. [] Progression is slowed due to complexities listed. [] Progression has been slowed due to co-morbidities.   [x] Plan just implemented, too soon to assess goals progression  [] Other:     Persisting Functional

## 2019-08-13 ENCOUNTER — TELEPHONE (OUTPATIENT)
Dept: FAMILY MEDICINE CLINIC | Age: 54
End: 2019-08-13

## 2019-08-19 ENCOUNTER — APPOINTMENT (OUTPATIENT)
Dept: GENERAL RADIOLOGY | Age: 54
End: 2019-08-19
Payer: MEDICAID

## 2019-08-19 ENCOUNTER — HOSPITAL ENCOUNTER (EMERGENCY)
Age: 54
Discharge: HOME | End: 2019-08-19
Attending: EMERGENCY MEDICINE
Payer: MEDICAID

## 2019-08-19 ENCOUNTER — APPOINTMENT (OUTPATIENT)
Dept: PHYSICAL THERAPY | Age: 54
End: 2019-08-19
Payer: MEDICAID

## 2019-08-19 VITALS
SYSTOLIC BLOOD PRESSURE: 139 MMHG | OXYGEN SATURATION: 99 % | HEIGHT: 68 IN | BODY MASS INDEX: 47.74 KG/M2 | RESPIRATION RATE: 16 BRPM | DIASTOLIC BLOOD PRESSURE: 94 MMHG | WEIGHT: 315 LBS | TEMPERATURE: 98.6 F | HEART RATE: 80 BPM

## 2019-08-19 DIAGNOSIS — M16.12 ARTHRITIS OF LEFT HIP: Primary | ICD-10-CM

## 2019-08-19 DIAGNOSIS — L73.9 FOLLICULITIS: ICD-10-CM

## 2019-08-19 PROCEDURE — 73502 X-RAY EXAM HIP UNI 2-3 VIEWS: CPT

## 2019-08-19 PROCEDURE — 99283 EMERGENCY DEPT VISIT LOW MDM: CPT

## 2019-08-19 RX ORDER — CEPHALEXIN 500 MG/1
500 CAPSULE ORAL 4 TIMES DAILY
Qty: 28 CAPSULE | Refills: 0 | Status: SHIPPED | OUTPATIENT
Start: 2019-08-19 | End: 2019-08-26

## 2019-08-19 ASSESSMENT — PAIN DESCRIPTION - LOCATION: LOCATION: LEG

## 2019-08-19 ASSESSMENT — PAIN SCALES - GENERAL: PAINLEVEL_OUTOF10: 5

## 2019-08-19 ASSESSMENT — PAIN DESCRIPTION - PAIN TYPE: TYPE: ACUTE PAIN

## 2019-08-19 ASSESSMENT — PAIN DESCRIPTION - DESCRIPTORS: DESCRIPTORS: TENDER;TIGHTNESS;ACHING

## 2019-08-19 ASSESSMENT — PAIN DESCRIPTION - FREQUENCY: FREQUENCY: CONTINUOUS

## 2019-08-19 ASSESSMENT — PAIN DESCRIPTION - ONSET: ONSET: ON-GOING

## 2019-08-19 ASSESSMENT — PAIN DESCRIPTION - PROGRESSION: CLINICAL_PROGRESSION: NOT CHANGED

## 2019-08-19 ASSESSMENT — PAIN DESCRIPTION - ORIENTATION: ORIENTATION: LEFT

## 2019-08-19 NOTE — ED PROVIDER NOTES
[facility]  EMERGENCY DEPARTMENT ENCOUNTER          [facility] RESIDENT NOTE       Date of evaluation: 8/19/2019    Chief Complaint     Leg Pain (pt stated he has been having pain in L leg with knots and swelling)      History of Present Illness     Lewis Colindres is a 53 y.o. male PMH diabetes, GERD, CORTES, gout, CKD stage II, HLD who presents with left hip pain 2 months duration. Patient states that he has had pain in his lateral right hip that has gotten progressively worse over the last 2 months with no inciting incident. He has been following up with his PCP for this problem and tried naproxen and muscle relaxers with no relief. Patient has pain with all range of motion left hip and is scheduled to receive aquatic therapy for his hip pain. He was supposed to receive it for the first time today, but the pool was closed. Patient states that he came here today because he was not able to get into his PCP. Patient also states that his girlfriend noticed swelling on his left inner thigh as well as small nodules yesterday. Patient denies any other problems including radiation of pain, fevers, chills, shortness of breath, chest pain. Review of Systems     Review of Systems   All other systems reviewed and are negative. Past Medical, Surgical, Family, and Social History     He has a past medical history of Arthritis, Chicken pox, Chronic GERD, Diabetes mellitus ([address] Dizziness, CORTES (generalized anxiety disorder), Gout, Hyperlipidemia, Obstructive sleep apnea syndrome, Panic attacks, Rash, and Vitamin D deficiency. He has a past surgical history that includes knee surgery; Hand surgery; Cisco tooth extraction; Tonsillectomy; Colonoscopy (2017); meniscectomy (Left); Knee arthroscopy (Right, 09/14/2017); and Upper gastrointestinal endoscopy (N/A, 9/10/2018).   His family history includes Elevated Lipids in his mother; High Cholesterol in his mother; No Known Problems in his brother, brother, father, maternal grandfather, maternal grandmother, paternal grandfather, paternal grandmother, and sister. He reports that he has never smoked. He has never used smokeless tobacco. He reports that he does not drink alcohol or use drugs. Medications     Previous Medications    NAPROXEN (NAPROSYN) 500 MG TABLET    Take 1 tablet by mouth 2 times daily (with meals)       Allergies     He has No Known Allergies. Physical Exam     INITIAL VITALS: BP: (!) 128/91, Temp: 98.6 °F (37 °C), Pulse: 92, Resp: 18, SpO2: 94 %   Physical Exam   Constitutional: He is oriented to person, place, and time. He appears well-developed and well-nourished. Patient is a morbidly obese male who is sitting up in bed no apparent distress. HENT:   Head: Normocephalic and atraumatic. Eyes: Pupils are equal, round, and reactive to light. EOM are normal.   Neck: Normal range of motion. Neck supple. Cardiovascular: Normal rate, regular rhythm, normal heart sounds and intact distal pulses. Pulmonary/Chest: Effort normal and breath sounds normal.   Abdominal: Soft. Bowel sounds are normal.   Musculoskeletal: Normal range of motion. He exhibits no tenderness. Patient has full range of motion, but pain is worse with range of motion. Neurological: He is alert and oriented to person, place, and time. No sensory deficit. Skin: Skin is warm and dry. Capillary refill takes less than 2 seconds. There is erythema. Patient has slight amount of swelling and erythema left inner thigh which appears to be folliculitis. Psychiatric: He has a normal mood and affect. Diagnostic Results       RADIOLOGY:  XR HIP 2-3 VW W PELVIS LEFT   Final Result   Impression:    No acute osseous injury. Mild to moderate left hip osteoarthritis. LABS:   No results found for this visit on 08/19/19.       RECENT VITALS:  BP: (!) 129/91, Temp: 98.6 °F (37 °C),Pulse: 80, Resp: 16, SpO2: 99 %     Procedures       ED Course     Nursing Notes, Past Medical

## 2019-08-19 NOTE — ED TRIAGE NOTES
Pt came in with complaints of leg swelling, knots on legs, tenderness, and pain. Tried to get into PCP, but they cant get him in. Per pt they said to come to ED. Pt complaining of pain of a 5 that is tender and aching.

## 2019-08-19 NOTE — ED NOTES
Patient prepared for and ready to be discharged. Patient discharged at this time in no acute distress after verbalizing understanding of discharge instructions. Patient left after receiving After Visit Summary instructions.           Vivian Griffiths RN  08/19/19 2738
Alert and oriented to person, place and time, memory intact, behavior appropriate to situation, PERRL.

## 2019-08-23 ENCOUNTER — HOSPITAL ENCOUNTER (OUTPATIENT)
Dept: PHYSICAL THERAPY | Age: 54
Setting detail: THERAPIES SERIES
Discharge: HOME | End: 2019-08-23
Payer: MEDICAID

## 2019-09-03 ENCOUNTER — HOSPITAL ENCOUNTER (OUTPATIENT)
Dept: PHYSICAL THERAPY | Age: 54
Setting detail: THERAPIES SERIES
Discharge: HOME | End: 2019-09-03
Payer: MEDICAID

## 2019-09-03 NOTE — PROGRESS NOTES
Physical Therapy  Cancellation/No-show Note  Patient Name:  Kashif Lamb  :  1965   Date:  9/3/2019  Cancelled visits to date: 1  No-shows to date: 3    Patient status for today's appointment patient:  []  [address]    []  Rescheduled appointment  [x]  No-show  ,  (pool), 9/3 (pool)     Reason given by patient:  []  Patient ill  []  Conflicting appointment  []  No transportation    []  Conflict with work  [x]  No reason given  []  Other:     Comments:  Has an interview     Phone call information:   [x]  Phone call made today to patient at 3:00pm at number provided:      []  Patient answered, conversation as follows:    [x]  Patient did not answer, message left as follows: mailbox is full so unable to leave a message   []  Phone call not made today    Electronically signed by:  Cecile Ochoa, PT, DPT

## 2019-09-30 ENCOUNTER — OFFICE VISIT (OUTPATIENT)
Dept: FAMILY MEDICINE CLINIC | Age: 54
End: 2019-09-30
Payer: MEDICAID

## 2019-09-30 VITALS
HEART RATE: 79 BPM | DIASTOLIC BLOOD PRESSURE: 82 MMHG | BODY MASS INDEX: 47.74 KG/M2 | WEIGHT: 315 LBS | OXYGEN SATURATION: 99 % | TEMPERATURE: 98.7 F | RESPIRATION RATE: 16 BRPM | HEIGHT: 68 IN | SYSTOLIC BLOOD PRESSURE: 131 MMHG

## 2019-09-30 DIAGNOSIS — M25.552 LEFT HIP PAIN: Primary | ICD-10-CM

## 2019-09-30 DIAGNOSIS — E11.9 CONTROLLED TYPE 2 DIABETES MELLITUS WITHOUT COMPLICATION, WITHOUT LONG-TERM CURRENT USE OF INSULIN (HCC): ICD-10-CM

## 2019-09-30 DIAGNOSIS — M16.12 ARTHRITIS OF LEFT HIP: ICD-10-CM

## 2019-09-30 DIAGNOSIS — E66.01 MORBID OBESITY (HCC): ICD-10-CM

## 2019-09-30 DIAGNOSIS — L73.9 FOLLICULITIS: ICD-10-CM

## 2019-09-30 DIAGNOSIS — E78.2 MIXED HYPERLIPIDEMIA: ICD-10-CM

## 2019-09-30 PROCEDURE — G8427 DOCREV CUR MEDS BY ELIG CLIN: HCPCS | Performed by: FAMILY MEDICINE

## 2019-09-30 PROCEDURE — 3017F COLORECTAL CA SCREEN DOC REV: CPT | Performed by: FAMILY MEDICINE

## 2019-09-30 PROCEDURE — 3044F HG A1C LEVEL LT 7.0%: CPT | Performed by: FAMILY MEDICINE

## 2019-09-30 PROCEDURE — 1036F TOBACCO NON-USER: CPT | Performed by: FAMILY MEDICINE

## 2019-09-30 PROCEDURE — 2022F DILAT RTA XM EVC RTNOPTHY: CPT | Performed by: FAMILY MEDICINE

## 2019-09-30 PROCEDURE — 99214 OFFICE O/P EST MOD 30 MIN: CPT | Performed by: FAMILY MEDICINE

## 2019-09-30 PROCEDURE — G8417 CALC BMI ABV UP PARAM F/U: HCPCS | Performed by: FAMILY MEDICINE

## 2019-09-30 ASSESSMENT — ENCOUNTER SYMPTOMS
NAUSEA: 0
ABDOMINAL PAIN: 0
STRIDOR: 0
RECTAL PAIN: 0
WHEEZING: 0
BLOOD IN STOOL: 0
ANAL BLEEDING: 0
CHEST TIGHTNESS: 0
APNEA: 0
SHORTNESS OF BREATH: 0
CONSTIPATION: 0
DIARRHEA: 0
VOMITING: 0
CHOKING: 0
COUGH: 0
ABDOMINAL DISTENTION: 0

## 2019-09-30 NOTE — PROGRESS NOTES
Vitamin D deficiency 8/3/2015           Social History     Tobacco Use    Smoking status: Never Smoker    Smokeless tobacco: Never Used   Substance Use Topics    Alcohol use: No     Alcohol/week: 0.0 standard drinks     Comment: rarely    Drug use: No     Social History     Substance and Sexual Activity   Drug Use No           Review of Systems   Constitutional: Negative for activity change, appetite change, chills, diaphoresis, fatigue, fever and unexpected weight change. HENT: Negative. Respiratory: Negative for apnea, cough, choking, chest tightness, shortness of breath, wheezing and stridor. Cardiovascular: Negative for chest pain, palpitations and leg swelling. Gastrointestinal: Negative for abdominal distention, abdominal pain, anal bleeding, blood in stool, constipation, diarrhea, nausea, rectal pain and vomiting. Endocrine: Negative for cold intolerance, heat intolerance, polydipsia, polyphagia and polyuria. Skin: Negative for rash and wound. Neurological: Negative for dizziness and light-headedness. Hematological: Negative for adenopathy. Objective:   Physical Exam   Constitutional: He is oriented to person, place, and time. He appears well-developed and well-nourished. He is cooperative. He does not have a sickly appearance. No distress. HENT:   Nose: Nose normal.   Mouth/Throat: Uvula is midline, oropharynx is clear and moist and mucous membranes are normal.   Hearing intact to nml conversation   Eyes: Pupils are equal, round, and reactive to light. Conjunctivae, EOM and lids are normal.   Neck: Trachea normal and normal range of motion. Neck supple. Cardiovascular: Normal rate, regular rhythm, normal heart sounds, intact distal pulses and normal pulses. Exam reveals no gallop. No murmur heard. No ankle edema. Pulmonary/Chest: Effort normal and breath sounds normal.   CTAB,good AE bilaterally   Abdominal: Soft.  Normal appearance and bowel sounds are normal. He

## 2019-10-04 ENCOUNTER — TELEPHONE (OUTPATIENT)
Dept: FAMILY MEDICINE CLINIC | Age: 54
End: 2019-10-04

## 2019-10-04 ENCOUNTER — HOSPITAL ENCOUNTER (EMERGENCY)
Age: 54
Discharge: HOME | End: 2019-10-04
Payer: MEDICAID

## 2019-10-04 ENCOUNTER — HOSPITAL ENCOUNTER (OUTPATIENT)
Dept: PHYSICAL THERAPY | Age: 54
Setting detail: THERAPIES SERIES
Discharge: HOME | End: 2019-10-04
Payer: MEDICAID

## 2019-10-04 VITALS
RESPIRATION RATE: 18 BRPM | BODY MASS INDEX: 42.44 KG/M2 | WEIGHT: 280 LBS | HEIGHT: 68 IN | TEMPERATURE: 98.2 F | OXYGEN SATURATION: 100 % | DIASTOLIC BLOOD PRESSURE: 97 MMHG | SYSTOLIC BLOOD PRESSURE: 140 MMHG | HEART RATE: 98 BPM

## 2019-10-04 DIAGNOSIS — H00.011 HORDEOLUM EXTERNUM OF RIGHT UPPER EYELID: Primary | ICD-10-CM

## 2019-10-04 PROCEDURE — 97110 THERAPEUTIC EXERCISES: CPT

## 2019-10-04 PROCEDURE — 6370000000 HC RX 637 (ALT 250 FOR IP): Performed by: PHYSICIAN ASSISTANT

## 2019-10-04 PROCEDURE — 99282 EMERGENCY DEPT VISIT SF MDM: CPT

## 2019-10-04 PROCEDURE — 97161 PT EVAL LOW COMPLEX 20 MIN: CPT

## 2019-10-04 RX ORDER — CIPROFLOXACIN HYDROCHLORIDE 3.5 MG/ML
SOLUTION/ DROPS TOPICAL
Refills: 0 | COMMUNITY
Start: 2019-10-03 | End: 2019-11-07 | Stop reason: ALTCHOICE

## 2019-10-04 RX ORDER — TETRACAINE HYDROCHLORIDE 5 MG/ML
1 SOLUTION OPHTHALMIC ONCE
Status: COMPLETED | OUTPATIENT
Start: 2019-10-04 | End: 2019-10-04

## 2019-10-04 RX ADMIN — FLUORESCEIN SODIUM 1 MG: 1 STRIP OPHTHALMIC at 23:51

## 2019-10-04 RX ADMIN — TETRACAINE HYDROCHLORIDE 1 DROP: 5 SOLUTION OPHTHALMIC at 23:51

## 2019-10-15 ENCOUNTER — HOSPITAL ENCOUNTER (OUTPATIENT)
Dept: PHYSICAL THERAPY | Age: 54
Setting detail: THERAPIES SERIES
Discharge: HOME | End: 2019-10-15
Payer: MEDICAID

## 2019-10-15 PROCEDURE — 97110 THERAPEUTIC EXERCISES: CPT

## 2019-10-15 PROCEDURE — 97140 MANUAL THERAPY 1/> REGIONS: CPT

## 2019-10-17 ENCOUNTER — APPOINTMENT (OUTPATIENT)
Dept: PHYSICAL THERAPY | Age: 54
End: 2019-10-17
Payer: MEDICAID

## 2019-10-18 ENCOUNTER — HOSPITAL ENCOUNTER (OUTPATIENT)
Dept: PHYSICAL THERAPY | Age: 54
Setting detail: THERAPIES SERIES
Discharge: HOME | End: 2019-10-18
Payer: MEDICAID

## 2019-10-18 PROCEDURE — 97110 THERAPEUTIC EXERCISES: CPT

## 2019-10-18 PROCEDURE — 97140 MANUAL THERAPY 1/> REGIONS: CPT

## 2019-10-24 ENCOUNTER — HOSPITAL ENCOUNTER (OUTPATIENT)
Dept: PHYSICAL THERAPY | Age: 54
Setting detail: THERAPIES SERIES
Discharge: HOME | End: 2019-10-24
Payer: MEDICAID

## 2019-10-24 PROCEDURE — 97140 MANUAL THERAPY 1/> REGIONS: CPT

## 2019-10-24 PROCEDURE — 97110 THERAPEUTIC EXERCISES: CPT

## 2019-10-31 ENCOUNTER — HOSPITAL ENCOUNTER (OUTPATIENT)
Dept: PHYSICAL THERAPY | Age: 54
Setting detail: THERAPIES SERIES
Discharge: HOME | End: 2019-10-31
Payer: MEDICAID

## 2019-10-31 PROCEDURE — 97140 MANUAL THERAPY 1/> REGIONS: CPT

## 2019-10-31 PROCEDURE — 97110 THERAPEUTIC EXERCISES: CPT

## 2019-11-05 ENCOUNTER — HOSPITAL ENCOUNTER (OUTPATIENT)
Dept: PHYSICAL THERAPY | Age: 54
Setting detail: THERAPIES SERIES
Discharge: HOME | End: 2019-11-05
Payer: MEDICAID

## 2019-11-05 PROCEDURE — 97140 MANUAL THERAPY 1/> REGIONS: CPT

## 2019-11-05 PROCEDURE — 97110 THERAPEUTIC EXERCISES: CPT

## 2019-11-07 ENCOUNTER — HOSPITAL ENCOUNTER (EMERGENCY)
Age: 54
Discharge: HOME | End: 2019-11-07
Attending: EMERGENCY MEDICINE
Payer: MEDICAID

## 2019-11-07 ENCOUNTER — HOSPITAL ENCOUNTER (OUTPATIENT)
Dept: PHYSICAL THERAPY | Age: 54
Setting detail: THERAPIES SERIES
Discharge: HOME | End: 2019-11-07
Payer: MEDICAID

## 2019-11-07 VITALS
OXYGEN SATURATION: 98 % | RESPIRATION RATE: 18 BRPM | HEART RATE: 83 BPM | SYSTOLIC BLOOD PRESSURE: 128 MMHG | TEMPERATURE: 98.1 F | DIASTOLIC BLOOD PRESSURE: 92 MMHG

## 2019-11-07 DIAGNOSIS — M10.072 ACUTE IDIOPATHIC GOUT OF LEFT ANKLE: Primary | ICD-10-CM

## 2019-11-07 PROCEDURE — 96372 THER/PROPH/DIAG INJ SC/IM: CPT

## 2019-11-07 PROCEDURE — 99283 EMERGENCY DEPT VISIT LOW MDM: CPT

## 2019-11-07 PROCEDURE — 6360000002 HC RX W HCPCS: Performed by: EMERGENCY MEDICINE

## 2019-11-07 RX ORDER — DEXAMETHASONE SODIUM PHOSPHATE 4 MG/ML
10 INJECTION, SOLUTION INTRA-ARTICULAR; INTRALESIONAL; INTRAMUSCULAR; INTRAVENOUS; SOFT TISSUE ONCE
Status: COMPLETED | OUTPATIENT
Start: 2019-11-07 | End: 2019-11-07

## 2019-11-07 RX ORDER — METHYLPREDNISOLONE 4 MG/1
TABLET ORAL
Qty: 1 KIT | Refills: 0 | Status: SHIPPED | OUTPATIENT
Start: 2019-11-07 | End: 2019-11-13

## 2019-11-07 RX ADMIN — DEXAMETHASONE SODIUM PHOSPHATE 10 MG: 4 INJECTION, SOLUTION INTRAMUSCULAR; INTRAVENOUS at 09:58

## 2019-11-07 ASSESSMENT — PAIN DESCRIPTION - ORIENTATION: ORIENTATION: LEFT

## 2019-11-07 ASSESSMENT — PAIN DESCRIPTION - PAIN TYPE: TYPE: ACUTE PAIN

## 2019-11-07 ASSESSMENT — PAIN DESCRIPTION - FREQUENCY: FREQUENCY: CONTINUOUS

## 2019-11-07 ASSESSMENT — PAIN SCALES - GENERAL: PAINLEVEL_OUTOF10: 9

## 2019-11-07 ASSESSMENT — ENCOUNTER SYMPTOMS: RESPIRATORY NEGATIVE: 1

## 2019-11-07 ASSESSMENT — PAIN - FUNCTIONAL ASSESSMENT: PAIN_FUNCTIONAL_ASSESSMENT: PREVENTS OR INTERFERES SOME ACTIVE ACTIVITIES AND ADLS

## 2019-11-07 ASSESSMENT — PAIN DESCRIPTION - LOCATION: LOCATION: ANKLE

## 2019-11-07 ASSESSMENT — PAIN DESCRIPTION - ONSET: ONSET: PROGRESSIVE

## 2019-11-07 ASSESSMENT — PAIN DESCRIPTION - PROGRESSION: CLINICAL_PROGRESSION: GRADUALLY WORSENING

## 2019-11-07 ASSESSMENT — PAIN DESCRIPTION - DESCRIPTORS: DESCRIPTORS: THROBBING

## 2019-11-12 ENCOUNTER — HOSPITAL ENCOUNTER (OUTPATIENT)
Dept: PHYSICAL THERAPY | Age: 54
Setting detail: THERAPIES SERIES
Discharge: HOME | End: 2019-11-12
Payer: MEDICAID

## 2019-11-12 PROCEDURE — 97140 MANUAL THERAPY 1/> REGIONS: CPT

## 2019-11-12 PROCEDURE — 97110 THERAPEUTIC EXERCISES: CPT

## 2019-11-14 ENCOUNTER — HOSPITAL ENCOUNTER (OUTPATIENT)
Dept: PHYSICAL THERAPY | Age: 54
Setting detail: THERAPIES SERIES
Discharge: HOME | End: 2019-11-14
Payer: MEDICAID

## 2019-11-14 PROCEDURE — 97110 THERAPEUTIC EXERCISES: CPT

## 2019-11-14 PROCEDURE — 97140 MANUAL THERAPY 1/> REGIONS: CPT

## 2019-11-19 ENCOUNTER — HOSPITAL ENCOUNTER (OUTPATIENT)
Dept: PHYSICAL THERAPY | Age: 54
Setting detail: THERAPIES SERIES
Discharge: HOME | End: 2019-11-19
Payer: MEDICAID

## 2019-11-19 PROCEDURE — 97110 THERAPEUTIC EXERCISES: CPT

## 2019-11-21 ENCOUNTER — HOSPITAL ENCOUNTER (OUTPATIENT)
Dept: PHYSICAL THERAPY | Age: 54
Setting detail: THERAPIES SERIES
Discharge: HOME | End: 2019-11-21
Payer: MEDICAID

## 2019-11-21 ENCOUNTER — APPOINTMENT (OUTPATIENT)
Dept: PHYSICAL THERAPY | Age: 54
End: 2019-11-21
Payer: MEDICAID

## 2019-11-21 PROCEDURE — 97110 THERAPEUTIC EXERCISES: CPT

## 2019-12-30 ENCOUNTER — OFFICE VISIT (OUTPATIENT)
Dept: FAMILY MEDICINE CLINIC | Age: 54
End: 2019-12-30
Payer: MEDICAID

## 2019-12-30 VITALS
DIASTOLIC BLOOD PRESSURE: 82 MMHG | WEIGHT: 315 LBS | BODY MASS INDEX: 47.74 KG/M2 | SYSTOLIC BLOOD PRESSURE: 135 MMHG | HEIGHT: 68 IN | OXYGEN SATURATION: 98 % | HEART RATE: 75 BPM | RESPIRATION RATE: 20 BRPM | TEMPERATURE: 98 F

## 2019-12-30 DIAGNOSIS — E11.9 CONTROLLED TYPE 2 DIABETES MELLITUS WITHOUT COMPLICATION, WITHOUT LONG-TERM CURRENT USE OF INSULIN (HCC): ICD-10-CM

## 2019-12-30 DIAGNOSIS — E78.2 MIXED HYPERLIPIDEMIA: ICD-10-CM

## 2019-12-30 DIAGNOSIS — H61.23 BILATERAL IMPACTED CERUMEN: ICD-10-CM

## 2019-12-30 DIAGNOSIS — H93.8X3 EAR FULLNESS, BILATERAL: Primary | ICD-10-CM

## 2019-12-30 LAB
A/G RATIO: 1.5 (ref 1.1–2.2)
ALBUMIN SERPL-MCNC: 4.6 G/DL (ref 3.4–5)
ALP BLD-CCNC: 81 U/L (ref 40–129)
ALT SERPL-CCNC: 18 U/L (ref 10–40)
ANION GAP SERPL CALCULATED.3IONS-SCNC: 13 MMOL/L (ref 3–16)
AST SERPL-CCNC: 14 U/L (ref 15–37)
BILIRUB SERPL-MCNC: 0.7 MG/DL (ref 0–1)
BUN BLDV-MCNC: 8 MG/DL (ref 7–20)
CALCIUM SERPL-MCNC: 9.8 MG/DL (ref 8.3–10.6)
CHLORIDE BLD-SCNC: 101 MMOL/L (ref 99–110)
CHOLESTEROL, TOTAL: 212 MG/DL (ref 0–199)
CO2: 26 MMOL/L (ref 21–32)
CREAT SERPL-MCNC: 1 MG/DL (ref 0.9–1.3)
GFR AFRICAN AMERICAN: >60
GFR NON-AFRICAN AMERICAN: >60
GLOBULIN: 3 G/DL
GLUCOSE BLD-MCNC: 83 MG/DL (ref 70–99)
HDLC SERPL-MCNC: 46 MG/DL (ref 40–60)
LDL CHOLESTEROL CALCULATED: 139 MG/DL
POTASSIUM SERPL-SCNC: 4.3 MMOL/L (ref 3.5–5.1)
SODIUM BLD-SCNC: 140 MMOL/L (ref 136–145)
TOTAL PROTEIN: 7.6 G/DL (ref 6.4–8.2)
TRIGL SERPL-MCNC: 133 MG/DL (ref 0–150)
VLDLC SERPL CALC-MCNC: 27 MG/DL

## 2019-12-30 PROCEDURE — 3017F COLORECTAL CA SCREEN DOC REV: CPT | Performed by: FAMILY MEDICINE

## 2019-12-30 PROCEDURE — 2022F DILAT RTA XM EVC RTNOPTHY: CPT | Performed by: FAMILY MEDICINE

## 2019-12-30 PROCEDURE — G8484 FLU IMMUNIZE NO ADMIN: HCPCS | Performed by: FAMILY MEDICINE

## 2019-12-30 PROCEDURE — 99214 OFFICE O/P EST MOD 30 MIN: CPT | Performed by: FAMILY MEDICINE

## 2019-12-30 PROCEDURE — G8427 DOCREV CUR MEDS BY ELIG CLIN: HCPCS | Performed by: FAMILY MEDICINE

## 2019-12-30 PROCEDURE — G8417 CALC BMI ABV UP PARAM F/U: HCPCS | Performed by: FAMILY MEDICINE

## 2019-12-30 PROCEDURE — 3044F HG A1C LEVEL LT 7.0%: CPT | Performed by: FAMILY MEDICINE

## 2019-12-30 PROCEDURE — 1036F TOBACCO NON-USER: CPT | Performed by: FAMILY MEDICINE

## 2019-12-30 PROCEDURE — 69210 REMOVE IMPACTED EAR WAX UNI: CPT | Performed by: FAMILY MEDICINE

## 2019-12-30 ASSESSMENT — ENCOUNTER SYMPTOMS
EYE DISCHARGE: 0
ABDOMINAL PAIN: 0
SORE THROAT: 0
STRIDOR: 0
CHOKING: 0
FACIAL SWELLING: 0
CHEST TIGHTNESS: 0
EYE ITCHING: 0
APNEA: 0
RHINORRHEA: 0
ABDOMINAL DISTENTION: 0
RECTAL PAIN: 0
DIARRHEA: 0
EYE REDNESS: 0
BLOOD IN STOOL: 0
COUGH: 0
SINUS PAIN: 0
WHEEZING: 0
SINUS PRESSURE: 0
PHOTOPHOBIA: 0
SHORTNESS OF BREATH: 0
TROUBLE SWALLOWING: 0
VOMITING: 0
EYE PAIN: 0
NAUSEA: 0
VOICE CHANGE: 0
ANAL BLEEDING: 0
CONSTIPATION: 0

## 2019-12-31 LAB
ESTIMATED AVERAGE GLUCOSE: 122.6 MG/DL
HBA1C MFR BLD: 5.9 %

## 2020-01-02 ENCOUNTER — COMMUNITY OUTREACH (OUTPATIENT)
Dept: OTHER | Age: 55
End: 2020-01-02

## 2020-01-02 NOTE — PROGRESS NOTES
Patient contacted [address] [facility] ([address] [facility] this date. Patient had been referred to the program by his primary care provider office, [facility]. Until very recently, patient had Medicaid. He has been employed temporarily since October, 2019, which put his income over for [place] eligibility. Patient thinks he enrolled in a [employer] plan for 2020, but has made no premium payment. As a result, it is unlikely he has coverage at this time. Patient was recently seen by his PCP and had lab work completed at a [facility]. He is worried about his ability to pay for these services upon receipt of bills. Based on his stated income at the time of services, patient likely meets eligibility for substantial financial assistance with [facility] ([facility] Patient reviewed the [address] [place] [address] financial assistance process with patient in detail and offered to help him apply upon receipt of bills. In addition, patient anticipates his employment will discontinue at the end of January. He plans to re-apply for Medicaid at the end of February. If approved, coverage will be retroactive to February 1, 2020.

## 2020-05-27 ENCOUNTER — PATIENT MESSAGE (OUTPATIENT)
Dept: FAMILY MEDICINE CLINIC | Age: 55
End: 2020-05-27

## 2020-06-10 ENCOUNTER — OFFICE VISIT (OUTPATIENT)
Dept: ENT CLINIC | Age: 55
End: 2020-06-10
Payer: MEDICAID

## 2020-06-10 VITALS
DIASTOLIC BLOOD PRESSURE: 94 MMHG | HEART RATE: 88 BPM | SYSTOLIC BLOOD PRESSURE: 134 MMHG | HEIGHT: 68 IN | WEIGHT: 315 LBS | BODY MASS INDEX: 47.74 KG/M2 | TEMPERATURE: 97.9 F

## 2020-06-10 PROCEDURE — 69210 REMOVE IMPACTED EAR WAX UNI: CPT | Performed by: OTOLARYNGOLOGY

## 2020-06-10 PROCEDURE — G8417 CALC BMI ABV UP PARAM F/U: HCPCS | Performed by: OTOLARYNGOLOGY

## 2020-06-10 PROCEDURE — 3017F COLORECTAL CA SCREEN DOC REV: CPT | Performed by: OTOLARYNGOLOGY

## 2020-06-10 PROCEDURE — 99213 OFFICE O/P EST LOW 20 MIN: CPT | Performed by: OTOLARYNGOLOGY

## 2020-06-10 PROCEDURE — 1036F TOBACCO NON-USER: CPT | Performed by: OTOLARYNGOLOGY

## 2020-06-10 PROCEDURE — G8427 DOCREV CUR MEDS BY ELIG CLIN: HCPCS | Performed by: OTOLARYNGOLOGY

## 2020-06-10 ASSESSMENT — ENCOUNTER SYMPTOMS
STRIDOR: 0
SINUS PAIN: 0
COUGH: 0
SINUS PRESSURE: 0
EYE DISCHARGE: 0
SORE THROAT: 0
VOMITING: 0
FACIAL SWELLING: 0
SHORTNESS OF BREATH: 0
PHOTOPHOBIA: 0
EYE ITCHING: 0
TROUBLE SWALLOWING: 0
WHEEZING: 0
CHOKING: 0
VOICE CHANGE: 0
BLOOD IN STOOL: 0
NAUSEA: 0
CONSTIPATION: 0
RHINORRHEA: 0
BACK PAIN: 0
COLOR CHANGE: 0
DIARRHEA: 0

## 2020-06-10 NOTE — PROGRESS NOTES
[facility]  [address], [address]  P: [phone]  F: [phone]       Patient     Geo Deshpande  1965    ChiefComplaint     Chief Complaint   Patient presents with    Cerumen Impaction     Patient is here today because both ears are impacted with wax, he says the left is worse than the right       History of Present Illness     Geo Deshpande is a pleasant 53 y.o. male who presents as a new patient today for bilateral cerumen impaction. He typically has impacted cerumen once a year. He is experiencing ear pain left greater than right as well as hearing loss left greater than right. He denies any history of ear infections or ear surgeries. Denies tinnitus. Denies dizziness. Denies any otorrhea. No other associated symptoms. Past Medical History     Past Medical History:   Diagnosis Date    Arthritis     Chicken pox     Chronic GERD 7/19/2018    Diabetes mellitus ([address]     per H&P    Dizziness     CORTES (generalized anxiety disorder)     Gout     Hyperlipidemia     Obstructive sleep apnea syndrome 08/08/2016    doesn't use CPAP    Panic attacks     Rash     Vitamin D deficiency 8/3/2015       Past Surgical History     Past Surgical History:   Procedure Laterality Date    COLONOSCOPY  2017    Per pt' was nml.     HAND SURGERY      KNEE ARTHROSCOPY Right 09/14/2017    RIGHT KNEE ARTHROSCOPY WITH PARTIAL MEDIAL AND LATERAL MENISCECTOMY,CHONDROPLASTY,SYNOVECTOMY    KNEE SURGERY      left meniscus    MENISCECTOMY Left     TONSILLECTOMY      UPPER GASTROINTESTINAL ENDOSCOPY N/A 9/10/2018    EGD BIOPSY performed by Greg Summers DO at [facility] EXTRACTION         Family History     Family History   Problem Relation Age of Onset    Elevated Lipids Mother     High Cholesterol Mother     No Known Problems Father     No Known Problems Brother     No Known Problems Brother     No Known Problems Sister     No Known

## 2020-06-25 ENCOUNTER — PATIENT MESSAGE (OUTPATIENT)
Dept: FAMILY MEDICINE CLINIC | Age: 55
End: 2020-06-25

## 2020-06-26 ENCOUNTER — TELEMEDICINE (OUTPATIENT)
Dept: FAMILY MEDICINE CLINIC | Age: 55
End: 2020-06-26
Payer: MEDICAID

## 2020-06-26 PROCEDURE — 3046F HEMOGLOBIN A1C LEVEL >9.0%: CPT | Performed by: FAMILY MEDICINE

## 2020-06-26 PROCEDURE — 2022F DILAT RTA XM EVC RTNOPTHY: CPT | Performed by: FAMILY MEDICINE

## 2020-06-26 PROCEDURE — 3017F COLORECTAL CA SCREEN DOC REV: CPT | Performed by: FAMILY MEDICINE

## 2020-06-26 PROCEDURE — 99214 OFFICE O/P EST MOD 30 MIN: CPT | Performed by: FAMILY MEDICINE

## 2020-06-26 PROCEDURE — G8427 DOCREV CUR MEDS BY ELIG CLIN: HCPCS | Performed by: FAMILY MEDICINE

## 2020-06-26 ASSESSMENT — ENCOUNTER SYMPTOMS
ABDOMINAL PAIN: 0
DIARRHEA: 0
WHEEZING: 0
CONSTIPATION: 0
CHEST TIGHTNESS: 0
ABDOMINAL DISTENTION: 0
CHOKING: 0
BLOOD IN STOOL: 0
COUGH: 0
ANAL BLEEDING: 0
APNEA: 0
SHORTNESS OF BREATH: 0
STRIDOR: 0
VOMITING: 0
RECTAL PAIN: 0
NAUSEA: 0

## 2020-06-26 NOTE — PROGRESS NOTES
Subjective:      Patient ID: Sophy Garner is a 63 y.o. male. HPI  Patient is  being evaluated by a Virtual Visit (video visit) encounter to address concerns as mentioned above. A caregiver was present when appropriate. Due to this being a TeleHealth encounter (During  public health emergency), evaluation of the following organ systems was limited: Vitals/Constitutional/EENT/Resp/CV/GI//MS/Neuro/Skin/Heme-Lymph-Imm. Pursuant to the emergency declaration under the [address] [place] [address], [address] [place] [address] authority and the [employer] Act, this Virtual Visit was conducted with patient's (and/or legal guardian's) consent, to reduce the patient's risk of exposure to COVID-19 and provide necessary medical care. The patient (and/or legal guardian) has also been advised to contact this office for worsening conditions or problems, and seek emergency medical treatment and/or call 911 if deemed necessary. Services were provided through a video synchronous discussion virtually to substitute for in-person clinic visit. Patient and provider were located at their individual homes. Video visit today conducted via [employer]. Peola Dryden well:Preprandial-fasting BS/2hr postprandial BS:has not checked recently. HBA1c: 6.6 on 18. was  6.1 on 19. LDL <100:92 on 18. 133 on 19. No new associated or worsening factors. Denies polyuria/polyphagia/polydipsia/BLE skin lesions/BLE paresthesia. Left hip pain >10months. Xray 19 showed Mild to moderate left hip osteoarthritis. p't request referral for PT/aquatic therapy to [facility]. Preceding injury:none. Associated w/nothing else. Improves w/rest.  Worsens w/ROM. Denies BLE ukwbeqei-olmbznjdgoq-naodlqexs/urinary or bowel control loss or change/saddle anesthesia/gait abnormality. Vit D def:doing well. Lab due.   Associated with nothing

## 2020-07-01 ENCOUNTER — TELEPHONE (OUTPATIENT)
Dept: FAMILY MEDICINE CLINIC | Age: 55
End: 2020-07-01

## 2020-07-01 NOTE — TELEPHONE ENCOUNTER
OV: 6/26/2020  CB: [phone]    Patient called for referral regarding aquatic therapy at [facility]. Referral is not in patient charts. Please advise.

## 2020-07-02 ENCOUNTER — NURSE ONLY (OUTPATIENT)
Dept: FAMILY MEDICINE CLINIC | Age: 55
End: 2020-07-02

## 2020-07-02 DIAGNOSIS — E11.9 CONTROLLED TYPE 2 DIABETES MELLITUS WITHOUT COMPLICATION, WITHOUT LONG-TERM CURRENT USE OF INSULIN (HCC): ICD-10-CM

## 2020-07-02 DIAGNOSIS — M1A.09X0 IDIOPATHIC CHRONIC GOUT OF MULTIPLE SITES WITHOUT TOPHUS: ICD-10-CM

## 2020-07-02 DIAGNOSIS — E78.2 MIXED HYPERLIPIDEMIA: ICD-10-CM

## 2020-07-02 DIAGNOSIS — E55.9 VITAMIN D DEFICIENCY: ICD-10-CM

## 2020-07-02 LAB
A/G RATIO: 1.7 (ref 1.1–2.2)
ALBUMIN SERPL-MCNC: 4.4 G/DL (ref 3.4–5)
ALP BLD-CCNC: 63 U/L (ref 40–129)
ALT SERPL-CCNC: 18 U/L (ref 10–40)
ANION GAP SERPL CALCULATED.3IONS-SCNC: 13 MMOL/L (ref 3–16)
AST SERPL-CCNC: 18 U/L (ref 15–37)
BILIRUB SERPL-MCNC: <0.2 MG/DL (ref 0–1)
BUN BLDV-MCNC: 7 MG/DL (ref 7–20)
CALCIUM SERPL-MCNC: 9.5 MG/DL (ref 8.3–10.6)
CHLORIDE BLD-SCNC: 105 MMOL/L (ref 99–110)
CHOLESTEROL, TOTAL: 221 MG/DL (ref 0–199)
CO2: 24 MMOL/L (ref 21–32)
CREAT SERPL-MCNC: 1 MG/DL (ref 0.9–1.3)
GFR AFRICAN AMERICAN: >60
GFR NON-AFRICAN AMERICAN: >60
GLOBULIN: 2.6 G/DL
GLUCOSE BLD-MCNC: 102 MG/DL (ref 70–99)
HDLC SERPL-MCNC: 40 MG/DL (ref 40–60)
LDL CHOLESTEROL CALCULATED: 161 MG/DL
POTASSIUM SERPL-SCNC: 4.9 MMOL/L (ref 3.5–5.1)
SODIUM BLD-SCNC: 142 MMOL/L (ref 136–145)
TOTAL PROTEIN: 7 G/DL (ref 6.4–8.2)
TRIGL SERPL-MCNC: 98 MG/DL (ref 0–150)
VLDLC SERPL CALC-MCNC: 20 MG/DL

## 2020-07-02 PROCEDURE — 36415 COLL VENOUS BLD VENIPUNCTURE: CPT | Performed by: FAMILY MEDICINE

## 2020-07-03 LAB
ESTIMATED AVERAGE GLUCOSE: 125.5 MG/DL
HBA1C MFR BLD: 6 %
URIC ACID, SERUM: 8.5 MG/DL (ref 3.5–7.2)
VITAMIN D 25-HYDROXY: 33.9 NG/ML

## 2020-07-07 ENCOUNTER — OFFICE VISIT (OUTPATIENT)
Dept: ORTHOPEDIC SURGERY | Age: 55
End: 2020-07-07
Payer: MEDICAID

## 2020-07-07 VITALS — TEMPERATURE: 97.7 F | BODY MASS INDEX: 47.74 KG/M2 | HEIGHT: 68 IN | WEIGHT: 315 LBS

## 2020-07-07 PROCEDURE — 1036F TOBACCO NON-USER: CPT | Performed by: PHYSICIAN ASSISTANT

## 2020-07-07 PROCEDURE — G8417 CALC BMI ABV UP PARAM F/U: HCPCS | Performed by: PHYSICIAN ASSISTANT

## 2020-07-07 PROCEDURE — G8427 DOCREV CUR MEDS BY ELIG CLIN: HCPCS | Performed by: PHYSICIAN ASSISTANT

## 2020-07-07 PROCEDURE — 99204 OFFICE O/P NEW MOD 45 MIN: CPT | Performed by: PHYSICIAN ASSISTANT

## 2020-07-07 PROCEDURE — 3017F COLORECTAL CA SCREEN DOC REV: CPT | Performed by: PHYSICIAN ASSISTANT

## 2020-07-07 ASSESSMENT — ENCOUNTER SYMPTOMS: BACK PAIN: 1

## 2020-07-07 NOTE — PROGRESS NOTES
Patient Name: Alia Reyes  : 1965  DOS: 2020        Chief Complaint:   Chief Complaint   Patient presents with    New Patient     left hip       History of Present Illness:  Alia Reyes is a 63 y.o. male who presents with a chief complaint of left hip pain for the past few years. Patient notes intermittent pain especially with long periods of standing walking and weightbearing. Notes pain is on the lateral aspect of the hip as well as in the anterior groin. Notes he has been doing stretch therapy for the past month with some improvements and is planning to start aqua therapy in the next few days to hopefully gain more improvement in regards to controlling pain in the hip. He notes he has utilized Aleve OTC which does help and reduction of his pain and allows him to sleep. Rates his overall pain today as a 6-7 out of 10. Patient denies utilization of any assistive walking devices at present time. Denies numbness burning tingling radiating pains down the leg. Denies any fall trauma or injury. Patient notes he did play a lot of football in high school and had multiple injuries and issues related to that especially in regards to his knees. She notes he has fairly significant arthritis on bilateral knees. He notes he is hopeful to try and get some of the weight off he has tried multiple diets and has had limited success believes that the start of gout and recurrent utilization of steroids is contributed to part of his weight gain problem. Medical History  Current Medications:   Prior to Admission medications    Medication Sig Start Date End Date Taking?  Authorizing Provider   clomiPHENE (CLOMID) 50 MG tablet TK 1 T PO 3 TIMES A WK 20  Yes Historical Provider, MD     Allergies: No Known Allergies  Past Medical History:   Diagnosis Date    Arthritis     Chicken pox     Chronic GERD 2018    Diabetes mellitus [address] [address]     per H&P    Dizziness     CORTES (generalized anxiety Known Problems Paternal Grandfather          Review of Systems:   Review of Systems   Constitutional: Negative for chills and fever. HENT: Negative for nosebleeds. Eyes: Negative for double vision. Cardiovascular: Negative for chest pain. Gastrointestinal: Negative for abdominal pain. Musculoskeletal: Positive for joint pain and myalgias. Skin: Negative for rash. Neurological: Negative for seizures. Psychiatric/Behavioral: Negative for hallucinations. All other systems reviewed and negative     Assessment   Vital Signs:   Temp 97.7 °F (36.5 °C) (Temporal)   Ht 5' 7.99\" (1.727 m)   Wt (!) 339 lb 1.1 oz (153.8 kg)   BMI 51.57 kg/m²     Physical Exam   Constitutional: Patient is oriented to person, place, and time and well-developed, well-nourished, and in no distress. HENT:   Head: Normocephalic and atraumatic. Eyes: Pupils are equal, round, and reactive to light. Neck: No tracheal deviation present. No thyromegaly present. Pulmonary/Chest: Effort normal.   Abdominal: Soft. There is no guarding. Musculoskeletal: Patient exhibits tenderness and pain. Neurological: Patient is alert and oriented to person, place, and time. Skin: Skin is warm. Psychiatric: Affect normal.       Left Hip Examination    Inspection: There is no swelling or ecchymosis. There is no obvious deformity. Palpation: There is tenderness over the greater trochanter. There is anterior groin tenderness. Range of Motion: [id number] ER30  IR25    Strength: Hip flexors rated: 4/5. Special Tests: Trendelenberg sign: positive       Skin: There are no rashes, ulcerations or lesions. Gait: Patient walks with a limp. Skin shows no rashes/ecchymosis to the affected area, no hyperesthesias, no discoloration, no temperature or color discrepancies. NEUROLOGICALLY: There is no evidence for sensory or motor deficits in the extremity. Coordination appears full with no spacticity or rigidity.  Reflexes appear to be symmetric. Distal circulation intact. No signs of RSD. Additional Comments:     Diagnostic Test Findings:   2 views of the left hip AP and lateral views were performed and reviewed today revealing moderate to severe osteoarthritic changes throughout the left hip. No evidence of femoral head compression fracture. No evidence of dislocation. Procedure(s):   No new procedure performed at today's date    Assessment and Plan:  1. Chronic left hip pain    2. Primary osteoarthritis of left hip        No follow-ups on file. The orders below, if any, were placed during this visit:   Orders Placed This Encounter   Procedures    XR HIP LEFT (2-3 VIEWS)     Standing Status:   Future     Number of Occurrences:   1     Standing Expiration Date:   7/7/2021     Order Specific Question:   Reason for exam:     Answer:   pain       Impression:   [unfilled]    Treatment Plan:   -Gave patient information regarding over-the-counter anti-inflammatory supplements advised utilization of multiple supplements to help with reduction overall irritation inflammation.   -Gave patient prescription for diclofenac 50 mg 1 twice daily as needed pain  -Advised patient to pursue aqua therapy to help with reduction in pain and improvement maintenance of range of motion of the hip.  -Advised patient to pursue continued dieting and attempts at weight loss and recommend pursuance of 5/2 diet  -Advised patient to follow-up in 6 to 8 weeks for repeat evaluation         JENNIFER Ponce

## 2020-07-07 NOTE — PROGRESS NOTES
Review of Systems   Genitourinary: Positive for frequency. Loss of urine / incontinence    Musculoskeletal: Positive for back pain and neck pain. All other systems reviewed and are negative.

## 2020-10-23 ENCOUNTER — HOSPITAL ENCOUNTER (EMERGENCY)
Age: 55
Discharge: HOME | End: 2020-10-23
Attending: EMERGENCY MEDICINE
Payer: MEDICAID

## 2020-10-23 VITALS
DIASTOLIC BLOOD PRESSURE: 82 MMHG | SYSTOLIC BLOOD PRESSURE: 136 MMHG | TEMPERATURE: 98.6 F | OXYGEN SATURATION: 95 % | RESPIRATION RATE: 21 BRPM | HEART RATE: 98 BPM

## 2020-10-23 PROCEDURE — 6370000000 HC RX 637 (ALT 250 FOR IP): Performed by: PHYSICIAN ASSISTANT

## 2020-10-23 PROCEDURE — 99282 EMERGENCY DEPT VISIT SF MDM: CPT

## 2020-10-23 RX ORDER — CEPHALEXIN 500 MG/1
500 CAPSULE ORAL 4 TIMES DAILY
Qty: 28 CAPSULE | Refills: 0 | Status: SHIPPED | OUTPATIENT
Start: 2020-10-23 | End: 2020-10-30

## 2020-10-23 RX ORDER — NAPROXEN 500 MG/1
500 TABLET ORAL ONCE
Status: COMPLETED | OUTPATIENT
Start: 2020-10-23 | End: 2020-10-23

## 2020-10-23 RX ADMIN — NAPROXEN 500 MG: 500 TABLET ORAL at 02:09

## 2020-10-23 ASSESSMENT — ENCOUNTER SYMPTOMS
RESPIRATORY NEGATIVE: 1
ABDOMINAL PAIN: 0
SORE THROAT: 0
VOMITING: 0
RHINORRHEA: 0
NAUSEA: 0

## 2020-10-23 ASSESSMENT — PAIN DESCRIPTION - ORIENTATION: ORIENTATION: LEFT

## 2020-10-23 ASSESSMENT — PAIN SCALES - GENERAL
PAINLEVEL_OUTOF10: 6
PAINLEVEL_OUTOF10: 6

## 2020-10-23 ASSESSMENT — PAIN DESCRIPTION - LOCATION: LOCATION: NECK

## 2020-10-23 NOTE — ED TRIAGE NOTES
Pt states yesterday morning he noticed a lump on the left side of his neck that is causing him pain 6/10. Pt denies sob, no cp, no fever. Pt also states the gout in his ankle is causing him pain .

## 2020-10-23 NOTE — ED PROVIDER NOTES
[facility]          PHYSICIAN ASSISTANT NOTE       Date of evaluation: 10/23/2020    Chief Complaint     Neck Pain      History of Present Illness     Jono Arreola is a 63 y.o. male who presents to the emergency department with 2 complaints. His first complaint is a \"lump in the back of his head\". The patient states he noticed this Wednesday night into Thursday morning. He states the size is about remain the same but he continued to have some pain so he came in for evaluation. He denies headache, visual changes or neck pain. He denies URI type symptoms such as cough, ear pain, congestion or rhinorrhea. Denies chest pain, shortness of breath, neck or back pain. He does state that he has pain in his right ankle from gout. He has a chronic history of gout and states this is typical for his gout. [place] Founds He denies injury or trauma to that ankle. Denies numbness or tingling. She has not taken any over-the-counter medicines for this. Review of Systems     Review of Systems   Constitutional: Negative for chills and fever. HENT: Negative. Negative for congestion, rhinorrhea and sore throat. Respiratory: Negative. Cardiovascular: Negative for chest pain, palpitations and leg swelling. Gastrointestinal: Negative for abdominal pain, nausea and vomiting. Genitourinary: Negative. Musculoskeletal: Negative. Skin:        Positive for left scalp pain   Neurological: Negative. Psychiatric/Behavioral: Negative. All other systems reviewed and are negative. Past Medical, Surgical, Family, and Social History     He has a past medical history of Arthritis, Chicken pox, Chronic GERD, Diabetes mellitus ([address] Dizziness, CORTES (generalized anxiety disorder), Gout, Hyperlipidemia, Obstructive sleep apnea syndrome, Panic attacks, Rash, and Vitamin D deficiency.   He has a past surgical history that includes knee surgery; Hand surgery; [place] tooth extraction; Tonsillectomy; Colonoscopy (2017); meniscectomy (Left); Knee arthroscopy (Right, 09/14/2017); and Upper gastrointestinal endoscopy (N/A, 9/10/2018). His family history includes Elevated Lipids in his mother; High Cholesterol in his mother; No Known Problems in his brother, brother, father, maternal grandfather, maternal grandmother, paternal grandfather, paternal grandmother, and sister. He reports that he has never smoked. He has never used smokeless tobacco. He reports that he does not drink alcohol or use drugs. Medications     Previous Medications    CLOMIPHENE (CLOMID) 50 MG TABLET    TK 1 T PO 3 TIMES A WK       Allergies     He has No Known Allergies. Physical Exam     INITIAL VITALS: BP: (!) 159/116, Temp: 98.6 °F (37 °C), Pulse: 98, Resp: 21, SpO2: 95 %  Physical Exam  Vitals signs and nursing note reviewed. Constitutional:       General: He is not in acute distress. Appearance: Normal appearance. He is well-developed and normal weight. HENT:      Head: Normocephalic and atraumatic. Right Ear: External ear normal.      Left Ear: External ear normal.      Nose: Nose normal.   Eyes:      General:         Right eye: No discharge. Left eye: No discharge. Extraocular Movements: Extraocular movements intact. Conjunctiva/sclera: Conjunctivae normal.      Pupils: Pupils are equal, round, and reactive to light. Neck:      Musculoskeletal: Normal range of motion and neck supple. Cardiovascular:      Rate and Rhythm: Normal rate and regular rhythm. Pulses: Normal pulses. Heart sounds: Normal heart sounds. Pulmonary:      Effort: Pulmonary effort is normal.      Breath sounds: Normal breath sounds. No wheezing, rhonchi or rales. Abdominal:      General: Bowel sounds are normal.      Palpations: Abdomen is soft. Tenderness: There is no abdominal tenderness. Musculoskeletal: Normal range of motion. Skin:     General: Skin is warm and dry.       Capillary Refill: Capillary refill takes less than 2 seconds. Comments: On examination of the left occipital scalp he has an indurated area that is not erythematous. No fluctuance to palpation. No warmth. Neurological:      General: No focal deficit present. Mental Status: He is alert and oriented to person, place, and time. Psychiatric:         Mood and Affect: Mood normal.         Behavior: Behavior normal.         Thought Content: Thought content normal.         Judgment: Judgment normal.         Diagnostic Results       RADIOLOGY:  No orders to display       LABS:   No results found for this visit on 10/23/20. RECENT VITALS:  BP: (!) 159/116, Temp: 98.6 °F (37 °C), Pulse: 98, Resp: 21, SpO2: 95 %     Procedures         ED Course     Nursing Notes, Past Medical Hx,Past Surgical Hx, Social Hx, Allergies, and Family Hx were reviewed. The patient was given the following medications:  Orders Placed This Encounter   Medications    naproxen (NAPROSYN) tablet 500 mg    diclofenac (VOLTAREN) 50 MG EC tablet     Sig: Take 1 tablet by mouth 2 times daily (with meals)     Dispense:  60 tablet     Refill:  0    cephALEXin (KEFLEX) 500 MG capsule     Sig: Take 1 capsule by mouth 4 times daily for 7 days     Dispense:  28 capsule     Refill:  0       CONSULTS:  None    MEDICAL DECISION MAKING / ASSESSMENT / Delano Pascual is a 63 y.o. male who presented to the emergency department with 2 complaints. In regards to his right ankle pain he has a history of gout, states this feels similar and for this will be treated with anti-inflammatories. He states this is helped him in the past.  He has had no injury or trauma therefore I do not feel imaging is necessary at this time. He has no findings concerning for cellulitis. In regards to the occipital scalp he has an indurated area that is not particularly warm nor is it erythematous however given the pain may be an early infection versus lymphadenopathy. He will be given a 7-day course of Keflex and is to follow-up with his primary care physician. He is to return to the emergency department for any worsening symptoms or concerns as discussed. This patient was also evaluated by the attending physician. All care plans were discussed and agreed upon. Clinical Impression     1. Neck pain    2. Right ankle pain, unspecified chronicity        Disposition     PATIENT REFERRED TO:  No follow-up provider specified.     DISCHARGE MEDICATIONS:  New Prescriptions    CEPHALEXIN (KEFLEX) 500 MG CAPSULE    Take 1 capsule by mouth 4 times daily for 7 days       DISPOSITION Decision To Discharge 10/23/2020 02:18:30 AM     JENNIFER Hunter  10/23/20 0240

## 2020-11-22 ENCOUNTER — HOSPITAL ENCOUNTER (EMERGENCY)
Age: 55
Discharge: HOME | End: 2020-11-22
Attending: EMERGENCY MEDICINE
Payer: MEDICAID

## 2020-11-22 ENCOUNTER — APPOINTMENT (OUTPATIENT)
Dept: GENERAL RADIOLOGY | Age: 55
End: 2020-11-22
Payer: MEDICAID

## 2020-11-22 VITALS
TEMPERATURE: 100.3 F | HEART RATE: 84 BPM | OXYGEN SATURATION: 100 % | SYSTOLIC BLOOD PRESSURE: 184 MMHG | DIASTOLIC BLOOD PRESSURE: 103 MMHG

## 2020-11-22 LAB
RAPID INFLUENZA  B AGN: NEGATIVE
RAPID INFLUENZA A AGN: NEGATIVE

## 2020-11-22 PROCEDURE — 71045 X-RAY EXAM CHEST 1 VIEW: CPT

## 2020-11-22 PROCEDURE — 6370000000 HC RX 637 (ALT 250 FOR IP): Performed by: STUDENT IN AN ORGANIZED HEALTH CARE EDUCATION/TRAINING PROGRAM

## 2020-11-22 PROCEDURE — U0003 INFECTIOUS AGENT DETECTION BY NUCLEIC ACID (DNA OR RNA); SEVERE ACUTE RESPIRATORY SYNDROME CORONAVIRUS 2 (SARS-COV-2) (CORONAVIRUS DISEASE [COVID-19]), AMPLIFIED PROBE TECHNIQUE, MAKING USE OF HIGH THROUGHPUT TECHNOLOGIES AS DESCRIBED BY CMS-2020-01-R: HCPCS

## 2020-11-22 PROCEDURE — 99283 EMERGENCY DEPT VISIT LOW MDM: CPT

## 2020-11-22 PROCEDURE — 87804 INFLUENZA ASSAY W/OPTIC: CPT

## 2020-11-22 RX ORDER — BENZONATATE 100 MG/1
100 CAPSULE ORAL 3 TIMES DAILY PRN
Qty: 30 CAPSULE | Refills: 0 | Status: SHIPPED | OUTPATIENT
Start: 2020-11-22 | End: 2020-11-29

## 2020-11-22 RX ORDER — ACETAMINOPHEN 500 MG
1000 TABLET ORAL ONCE
Status: COMPLETED | OUTPATIENT
Start: 2020-11-22 | End: 2020-11-22

## 2020-11-22 RX ADMIN — ACETAMINOPHEN 1000 MG: 500 TABLET ORAL at 21:34

## 2020-11-22 ASSESSMENT — PAIN SCALES - GENERAL
PAINLEVEL_OUTOF10: 6
PAINLEVEL_OUTOF10: 6

## 2020-11-22 ASSESSMENT — PAIN DESCRIPTION - LOCATION: LOCATION: GENERALIZED

## 2020-11-22 ASSESSMENT — PAIN DESCRIPTION - DESCRIPTORS: DESCRIPTORS: ACHING

## 2020-11-22 ASSESSMENT — PAIN DESCRIPTION - PAIN TYPE: TYPE: ACUTE PAIN

## 2020-11-23 ENCOUNTER — CARE COORDINATION (OUTPATIENT)
Dept: CARE COORDINATION | Age: 55
End: 2020-11-23

## 2020-11-23 LAB — SARS-COV-2, PCR: DETECTED

## 2020-11-23 NOTE — CARE COORDINATION
Patient contacted regarding Consuelo Esaley. Discussed COVID-19 related testing which was pending at this time. Test results were pending. Patient informed of results, if available? Yes    Care Transition Nurse/ Ambulatory Care Manager contacted the patient by telephone to perform post discharge assessment. Call within 2 business days of discharge: Yes. Verified name and  with patient as identifiers. Provided introduction to self, and explanation of the CTN/ACM role, and reason for call due to risk factors for infection and/or exposure to COVID-19. Symptoms reviewed with patient who verbalized the following symptoms: no new symptoms and no worsening symptoms. Due to no new or worsening symptoms encounter was not routed to provider for escalation. Discussed follow-up appointments. If no appointment was previously scheduled, appointment scheduling offered: plans to call after [facility] follow up appointment(s): No future appointments. Non-[facility] follow up appointment(s): na    Non-face-to-face services provided:  see above     Advance Care Planning:   Does patient have an Advance Directive:  not on file. Patient has following risk factors of: no known risk factors. CTN/ACM reviewed discharge instructions, medical action plan and red flags such as increased shortness of breath, increasing fever and signs of decompensation with patient who verbalized understanding. Discussed exposure protocols and quarantine with CDC Guidelines What to do if you are sick with coronavirus disease .  Patient was given an opportunity for questions and concerns. The patient agrees to contact the [employer] exposure line [phone], local [facility] department [place] [facility]: ([phone]) and PCP office for questions related to their healthcare. CTN/ACM provided contact information for future needs.     Reviewed and educated patient on any new and changed medications related to discharge diagnosis Patient/family/caregiver given information for [employer] and agrees to enroll yes  Patient's preferred e-mail:  [email or web address]  Patient's preferred phone number: [phone]  Based on Loop alert triggers, patient will be contacted by nurse care manager for worsening symptoms. Pt will be further monitored by COVID Loop Team based on severity of symptoms and risk factors. Reports no changes. Plans on getting his prescription today since it was too late lastnight to get them filled.

## 2020-11-23 NOTE — ED PROVIDER NOTES
[address] [facility] RESIDENT NOTE       Date of evaluation: 11/22/2020    Chief Complaint     Concern For COVID-19      History of Present Illness     Cielo Huratdo is a 63 y.o. male with a history of hypertension and hyperlipidemia who presents with myalgias, fatigue, and cough productive of white sputum for the past 4 days. The patient had a known exposure to coronavirus 5 days ago. He felt that symptoms were somewhat worse today so presented to the emergency department. He denies any chest pain, shortness of breath, fevers at home, lower extremity edema, diarrhea, nausea or vomiting, or decreased p.o. intake. He has no other complaints. Review of Systems     Review of Systems   All other systems reviewed and are negative. Past Medical, Surgical, Family, and Social History     He has a past medical history of Arthritis, Chicken pox, Chronic GERD, Diabetes mellitus ([address] Dizziness, CORTES (generalized anxiety disorder), Gout, Hyperlipidemia, Obstructive sleep apnea syndrome, Panic attacks, Rash, and Vitamin D deficiency. He has a past surgical history that includes knee surgery; Hand surgery; [place] tooth extraction; Tonsillectomy; Colonoscopy (2017); meniscectomy (Left); Knee arthroscopy (Right, 09/14/2017); and Upper gastrointestinal endoscopy (N/A, 9/10/2018). His family history includes Elevated Lipids in his mother; High Cholesterol in his mother; No Known Problems in his brother, brother, father, maternal grandfather, maternal grandmother, paternal grandfather, paternal grandmother, and sister. He reports that he has never smoked. He has never used smokeless tobacco. He reports that he does not drink alcohol or use drugs.     Medications     Previous Medications    CLOMIPHENE (CLOMID) 50 MG TABLET    TK 1 T PO 3 TIMES A WK    DICLOFENAC (VOLTAREN) 50 MG EC TABLET    Take 1 tablet by mouth 2 times daily (with meals)       Allergies     He has No Known Allergies. Physical Exam     INITIAL VITALS: BP: (!) 151/108, Temp: 100.3 °F (37.9 °C), Pulse: 106,  , SpO2: 95 %   Physical Exam  Constitutional:       General: He is not in acute distress. Appearance: He is well-developed. He is obese. He is not diaphoretic. HENT:      Head: Normocephalic and atraumatic. Right Ear: External ear normal.      Left Ear: External ear normal.      Nose: Nose normal.   Eyes:      Conjunctiva/sclera: Conjunctivae normal.      Pupils: Pupils are equal, round, and reactive to light. Cardiovascular:      Rate and Rhythm: Normal rate and regular rhythm. Heart sounds: Normal heart sounds. Pulmonary:      Effort: Pulmonary effort is normal. No respiratory distress. Breath sounds: Rhonchi (All lung fields) present. Abdominal:      General: There is no distension. Palpations: Abdomen is soft. Tenderness: There is no abdominal tenderness. Musculoskeletal: Normal range of motion. General: No tenderness. Skin:     General: Skin is warm and dry. Findings: No rash. Neurological:      General: No focal deficit present. Mental Status: He is alert and oriented to person, place, and time. Motor: No abnormal muscle tone. Psychiatric:         Mood and Affect: Mood normal.         Behavior: Behavior normal.         Thought Content: Thought content normal.         DiagnosticResults       RADIOLOGY:  XR CHEST PORTABLE   Final Result      Normal.          LABS:   Results for orders placed or performed during the hospital encounter of 11/22/20   Rapid Flu Swab    Specimen: Nasopharyngeal   Result Value Ref Range    Rapid Influenza A Ag Negative Negative    Rapid Influenza B Ag Negative Negative       RECENT VITALS:  BP: (!) 151/108, Temp: 100.3 °F (37.9 °C), Pulse: 106, , SpO2: 95 %     Procedures         ED Course     Nursing Notes, Past Medical Hx, Past Surgical Hx, Social Hx, Allergies, and Family Hx were reviewed.     The patient was

## 2020-11-23 NOTE — ED PROVIDER NOTES
ED Attending Attestation Note     Date of evaluation: 11/22/2020    This patient was seen by the resident. I have seen and examined the patient, agree with the workup, evaluation, management and diagnosis. The care plan has been discussed. I was present for any procedures performed in the resident's  note and have made edits to the note where appropriate. My assessment reveals 63 y.o. male presenting for cough and shortness of breath. Has had Covid exposures and symptoms most consistent with Covid. No respiratory distress, is borderline febrile here, equal chest excursion, speaking in full sentences.          Emelina Givens MD  11/23/20 [id number]

## 2020-12-04 ENCOUNTER — APPOINTMENT (OUTPATIENT)
Dept: GENERAL RADIOLOGY | Age: 55
DRG: 137 | End: 2020-12-04
Payer: MEDICAID

## 2020-12-04 ENCOUNTER — HOSPITAL ENCOUNTER (INPATIENT)
Age: 55
LOS: 1 days | Discharge: HOME | DRG: 137 | End: 2020-12-05
Attending: EMERGENCY MEDICINE | Admitting: INTERNAL MEDICINE
Payer: MEDICAID

## 2020-12-04 ENCOUNTER — APPOINTMENT (OUTPATIENT)
Dept: CT IMAGING | Age: 55
DRG: 137 | End: 2020-12-04
Payer: MEDICAID

## 2020-12-04 ENCOUNTER — NURSE TRIAGE (OUTPATIENT)
Dept: OTHER | Facility: CLINIC | Age: 55
End: 2020-12-04

## 2020-12-04 PROBLEM — J12.82 PNEUMONIA DUE TO COVID-19 VIRUS: Status: ACTIVE | Noted: 2020-12-04

## 2020-12-04 PROBLEM — U07.1 PNEUMONIA DUE TO COVID-19 VIRUS: Status: ACTIVE | Noted: 2020-12-04

## 2020-12-04 LAB
A/G RATIO: 0.9 (ref 1.1–2.2)
ABO/RH: NORMAL
ALBUMIN SERPL-MCNC: 4.1 G/DL (ref 3.4–5)
ALP BLD-CCNC: 72 U/L (ref 40–129)
ALT SERPL-CCNC: 43 U/L (ref 10–40)
ANION GAP SERPL CALCULATED.3IONS-SCNC: 13 MMOL/L (ref 3–16)
ANTIBODY SCREEN: NORMAL
APTT: 30.3 SEC (ref 24.2–36.2)
AST SERPL-CCNC: 41 U/L (ref 15–37)
BASE EXCESS VENOUS: 4.4 MMOL/L (ref -2–3)
BASOPHILS ABSOLUTE: 0 K/UL (ref 0–0.2)
BASOPHILS RELATIVE PERCENT: 0.6 %
BILIRUB SERPL-MCNC: 0.6 MG/DL (ref 0–1)
BUN BLDV-MCNC: 11 MG/DL (ref 7–20)
C-REACTIVE PROTEIN: 99.9 MG/L (ref 0–5.1)
CALCIUM SERPL-MCNC: 9.9 MG/DL (ref 8.3–10.6)
CARBOXYHEMOGLOBIN: 1 % (ref 0–1.5)
CHLORIDE BLD-SCNC: 96 MMOL/L (ref 99–110)
CO2: 28 MMOL/L (ref 21–32)
CREAT SERPL-MCNC: 1.1 MG/DL (ref 0.9–1.3)
D DIMER: 401 NG/ML DDU (ref 0–229)
EOSINOPHILS ABSOLUTE: 0 K/UL (ref 0–0.6)
EOSINOPHILS RELATIVE PERCENT: 0.7 %
FERRITIN: 547.6 NG/ML (ref 30–400)
FIBRINOGEN: 783 MG/DL (ref 200–397)
GFR AFRICAN AMERICAN: >60
GFR NON-AFRICAN AMERICAN: >60
GLOBULIN: 4.8 G/DL
GLUCOSE BLD-MCNC: 109 MG/DL (ref 70–99)
GLUCOSE BLD-MCNC: 212 MG/DL (ref 70–99)
GLUCOSE BLD-MCNC: 80 MG/DL (ref 70–99)
HCO3 VENOUS: 31.3 MMOL/L (ref 24–28)
HCT VFR BLD CALC: 45 % (ref 40.5–52.5)
HEMOGLOBIN, VEN, REDUCED: 79.2 %
HEMOGLOBIN: 14.7 G/DL (ref 13.5–17.5)
INR BLD: 1.11 (ref 0.86–1.14)
LACTATE DEHYDROGENASE: 382 U/L (ref 100–190)
LACTIC ACID: 1.3 MMOL/L (ref 0.4–2)
LYMPHOCYTES ABSOLUTE: 1 K/UL (ref 1–5.1)
LYMPHOCYTES RELATIVE PERCENT: 18.6 %
MCH RBC QN AUTO: 29.5 PG (ref 26–34)
MCHC RBC AUTO-ENTMCNC: 32.7 G/DL (ref 31–36)
MCV RBC AUTO: 90.1 FL (ref 80–100)
METHEMOGLOBIN VENOUS: 0.5 % (ref 0–1.5)
MONOCYTES ABSOLUTE: 0.7 K/UL (ref 0–1.3)
MONOCYTES RELATIVE PERCENT: 13.1 %
NEUTROPHILS ABSOLUTE: 3.5 K/UL (ref 1.7–7.7)
NEUTROPHILS RELATIVE PERCENT: 67 %
O2 SAT, VEN: 20 %
PCO2, VEN: 55 MMHG (ref 41–51)
PDW BLD-RTO: 14.3 % (ref 12.4–15.4)
PERFORMED ON: ABNORMAL
PERFORMED ON: ABNORMAL
PH VENOUS: 7.37 (ref 7.35–7.45)
PLATELET # BLD: 364 K/UL (ref 135–450)
PMV BLD AUTO: 7.9 FL (ref 5–10.5)
PO2, VEN: 19.1 MMHG (ref 25–40)
POTASSIUM REFLEX MAGNESIUM: 4.1 MMOL/L (ref 3.5–5.1)
PRO-BNP: 8 PG/ML (ref 0–124)
PROCALCITONIN: 0.08 NG/ML (ref 0–0.15)
PROTHROMBIN TIME: 12.9 SEC (ref 10–13.2)
RBC # BLD: 4.99 M/UL (ref 4.2–5.9)
SODIUM BLD-SCNC: 137 MMOL/L (ref 136–145)
TCO2 CALC VENOUS: 33 MMOL/L
TOTAL PROTEIN: 8.9 G/DL (ref 6.4–8.2)
TROPONIN: <0.01 NG/ML
WBC # BLD: 5.2 K/UL (ref 4–11)

## 2020-12-04 PROCEDURE — 87631 RESP VIRUS 3-5 TARGETS: CPT

## 2020-12-04 PROCEDURE — 71045 X-RAY EXAM CHEST 1 VIEW: CPT

## 2020-12-04 PROCEDURE — 6370000000 HC RX 637 (ALT 250 FOR IP): Performed by: PHYSICIAN ASSISTANT

## 2020-12-04 PROCEDURE — 85384 FIBRINOGEN ACTIVITY: CPT

## 2020-12-04 PROCEDURE — 6370000000 HC RX 637 (ALT 250 FOR IP): Performed by: INTERNAL MEDICINE

## 2020-12-04 PROCEDURE — 86850 RBC ANTIBODY SCREEN: CPT

## 2020-12-04 PROCEDURE — 6370000000 HC RX 637 (ALT 250 FOR IP): Performed by: STUDENT IN AN ORGANIZED HEALTH CARE EDUCATION/TRAINING PROGRAM

## 2020-12-04 PROCEDURE — 71260 CT THORAX DX C+: CPT

## 2020-12-04 PROCEDURE — 87070 CULTURE OTHR SPECIMN AEROBIC: CPT

## 2020-12-04 PROCEDURE — 2580000003 HC RX 258: Performed by: STUDENT IN AN ORGANIZED HEALTH CARE EDUCATION/TRAINING PROGRAM

## 2020-12-04 PROCEDURE — 83880 ASSAY OF NATRIURETIC PEPTIDE: CPT

## 2020-12-04 PROCEDURE — 6360000004 HC RX CONTRAST MEDICATION: Performed by: STUDENT IN AN ORGANIZED HEALTH CARE EDUCATION/TRAINING PROGRAM

## 2020-12-04 PROCEDURE — 2060000000 HC ICU INTERMEDIATE R&B

## 2020-12-04 PROCEDURE — 80053 COMPREHEN METABOLIC PANEL: CPT

## 2020-12-04 PROCEDURE — 82306 VITAMIN D 25 HYDROXY: CPT

## 2020-12-04 PROCEDURE — 84484 ASSAY OF TROPONIN QUANT: CPT

## 2020-12-04 PROCEDURE — 85730 THROMBOPLASTIN TIME PARTIAL: CPT

## 2020-12-04 PROCEDURE — 83615 LACTATE (LD) (LDH) ENZYME: CPT

## 2020-12-04 PROCEDURE — 82803 BLOOD GASES ANY COMBINATION: CPT

## 2020-12-04 PROCEDURE — 36415 COLL VENOUS BLD VENIPUNCTURE: CPT

## 2020-12-04 PROCEDURE — 85610 PROTHROMBIN TIME: CPT

## 2020-12-04 PROCEDURE — 96374 THER/PROPH/DIAG INJ IV PUSH: CPT

## 2020-12-04 PROCEDURE — 87205 SMEAR GRAM STAIN: CPT

## 2020-12-04 PROCEDURE — 86900 BLOOD TYPING SEROLOGIC ABO: CPT

## 2020-12-04 PROCEDURE — 85379 FIBRIN DEGRADATION QUANT: CPT

## 2020-12-04 PROCEDURE — 6360000002 HC RX W HCPCS: Performed by: PHYSICIAN ASSISTANT

## 2020-12-04 PROCEDURE — 99283 EMERGENCY DEPT VISIT LOW MDM: CPT

## 2020-12-04 PROCEDURE — 83605 ASSAY OF LACTIC ACID: CPT

## 2020-12-04 PROCEDURE — 85025 COMPLETE CBC W/AUTO DIFF WBC: CPT

## 2020-12-04 PROCEDURE — 82728 ASSAY OF FERRITIN: CPT

## 2020-12-04 PROCEDURE — 86140 C-REACTIVE PROTEIN: CPT

## 2020-12-04 PROCEDURE — 86901 BLOOD TYPING SEROLOGIC RH(D): CPT

## 2020-12-04 PROCEDURE — 94640 AIRWAY INHALATION TREATMENT: CPT

## 2020-12-04 PROCEDURE — 87449 NOS EACH ORGANISM AG IA: CPT

## 2020-12-04 PROCEDURE — 84145 PROCALCITONIN (PCT): CPT

## 2020-12-04 RX ORDER — METHYLPREDNISOLONE SODIUM SUCCINATE 40 MG/ML
40 INJECTION, POWDER, LYOPHILIZED, FOR SOLUTION INTRAMUSCULAR; INTRAVENOUS ONCE
Status: COMPLETED | OUTPATIENT
Start: 2020-12-04 | End: 2020-12-04

## 2020-12-04 RX ORDER — ALBUTEROL SULFATE 90 UG/1
2 AEROSOL, METERED RESPIRATORY (INHALATION) ONCE
Status: DISCONTINUED | OUTPATIENT
Start: 2020-12-04 | End: 2020-12-04 | Stop reason: ALTCHOICE

## 2020-12-04 RX ORDER — GUAIFENESIN/DEXTROMETHORPHAN 100-10MG/5
10 SYRUP ORAL ONCE
Status: COMPLETED | OUTPATIENT
Start: 2020-12-04 | End: 2020-12-04

## 2020-12-04 RX ORDER — PROMETHAZINE HYDROCHLORIDE 25 MG/1
12.5 TABLET ORAL EVERY 6 HOURS PRN
Status: DISCONTINUED | OUTPATIENT
Start: 2020-12-04 | End: 2020-12-05 | Stop reason: HOSPADM

## 2020-12-04 RX ORDER — ATORVASTATIN CALCIUM 40 MG/1
40 TABLET, FILM COATED ORAL NIGHTLY
Status: DISCONTINUED | OUTPATIENT
Start: 2020-12-04 | End: 2020-12-05 | Stop reason: HOSPADM

## 2020-12-04 RX ORDER — VITAMIN B COMPLEX
6000 TABLET ORAL DAILY
Status: DISCONTINUED | OUTPATIENT
Start: 2020-12-04 | End: 2020-12-05 | Stop reason: HOSPADM

## 2020-12-04 RX ORDER — SODIUM CHLORIDE 0.9 % (FLUSH) 0.9 %
10 SYRINGE (ML) INJECTION EVERY 12 HOURS SCHEDULED
Status: DISCONTINUED | OUTPATIENT
Start: 2020-12-04 | End: 2020-12-05 | Stop reason: HOSPADM

## 2020-12-04 RX ORDER — ACETAMINOPHEN 325 MG/1
650 TABLET ORAL EVERY 6 HOURS PRN
Status: DISCONTINUED | OUTPATIENT
Start: 2020-12-04 | End: 2020-12-05 | Stop reason: HOSPADM

## 2020-12-04 RX ORDER — DEXAMETHASONE SODIUM PHOSPHATE 10 MG/ML
6 INJECTION, SOLUTION INTRAMUSCULAR; INTRAVENOUS DAILY
Status: DISCONTINUED | OUTPATIENT
Start: 2020-12-04 | End: 2020-12-04

## 2020-12-04 RX ORDER — DEXTROSE MONOHYDRATE 25 G/50ML
12.5 INJECTION, SOLUTION INTRAVENOUS PRN
Status: DISCONTINUED | OUTPATIENT
Start: 2020-12-04 | End: 2020-12-05 | Stop reason: HOSPADM

## 2020-12-04 RX ORDER — SODIUM CHLORIDE 0.9 % (FLUSH) 0.9 %
10 SYRINGE (ML) INJECTION PRN
Status: DISCONTINUED | OUTPATIENT
Start: 2020-12-04 | End: 2020-12-05 | Stop reason: HOSPADM

## 2020-12-04 RX ORDER — DEXTROSE MONOHYDRATE 50 MG/ML
100 INJECTION, SOLUTION INTRAVENOUS PRN
Status: DISCONTINUED | OUTPATIENT
Start: 2020-12-04 | End: 2020-12-05 | Stop reason: HOSPADM

## 2020-12-04 RX ORDER — ONDANSETRON 2 MG/ML
4 INJECTION INTRAMUSCULAR; INTRAVENOUS EVERY 6 HOURS PRN
Status: DISCONTINUED | OUTPATIENT
Start: 2020-12-04 | End: 2020-12-05 | Stop reason: HOSPADM

## 2020-12-04 RX ORDER — LABETALOL HYDROCHLORIDE 5 MG/ML
5 INJECTION, SOLUTION INTRAVENOUS EVERY 4 HOURS PRN
Status: DISCONTINUED | OUTPATIENT
Start: 2020-12-04 | End: 2020-12-05 | Stop reason: HOSPADM

## 2020-12-04 RX ORDER — DEXAMETHASONE SODIUM PHOSPHATE 10 MG/ML
6 INJECTION, SOLUTION INTRAMUSCULAR; INTRAVENOUS DAILY
Status: DISCONTINUED | OUTPATIENT
Start: 2020-12-05 | End: 2020-12-05 | Stop reason: HOSPADM

## 2020-12-04 RX ORDER — IPRATROPIUM BROMIDE AND ALBUTEROL SULFATE 2.5; .5 MG/3ML; MG/3ML
1 SOLUTION RESPIRATORY (INHALATION) ONCE
Status: COMPLETED | OUTPATIENT
Start: 2020-12-04 | End: 2020-12-04

## 2020-12-04 RX ORDER — ZINC SULFATE 50(220)MG
50 CAPSULE ORAL DAILY
Status: DISCONTINUED | OUTPATIENT
Start: 2020-12-04 | End: 2020-12-05 | Stop reason: HOSPADM

## 2020-12-04 RX ORDER — 0.9 % SODIUM CHLORIDE 0.9 %
1000 INTRAVENOUS SOLUTION INTRAVENOUS ONCE
Status: COMPLETED | OUTPATIENT
Start: 2020-12-04 | End: 2020-12-04

## 2020-12-04 RX ORDER — NICOTINE POLACRILEX 4 MG
15 LOZENGE BUCCAL PRN
Status: DISCONTINUED | OUTPATIENT
Start: 2020-12-04 | End: 2020-12-05 | Stop reason: HOSPADM

## 2020-12-04 RX ORDER — POLYETHYLENE GLYCOL 3350 17 G/17G
17 POWDER, FOR SOLUTION ORAL DAILY PRN
Status: DISCONTINUED | OUTPATIENT
Start: 2020-12-04 | End: 2020-12-05 | Stop reason: HOSPADM

## 2020-12-04 RX ORDER — INSULIN LISPRO 100 [IU]/ML
0-3 INJECTION, SOLUTION INTRAVENOUS; SUBCUTANEOUS NIGHTLY
Status: DISCONTINUED | OUTPATIENT
Start: 2020-12-04 | End: 2020-12-05 | Stop reason: HOSPADM

## 2020-12-04 RX ORDER — INSULIN LISPRO 100 [IU]/ML
0-6 INJECTION, SOLUTION INTRAVENOUS; SUBCUTANEOUS
Status: DISCONTINUED | OUTPATIENT
Start: 2020-12-04 | End: 2020-12-05 | Stop reason: HOSPADM

## 2020-12-04 RX ORDER — ACETAMINOPHEN 650 MG/1
650 SUPPOSITORY RECTAL EVERY 6 HOURS PRN
Status: DISCONTINUED | OUTPATIENT
Start: 2020-12-04 | End: 2020-12-05 | Stop reason: HOSPADM

## 2020-12-04 RX ORDER — IMIPRAMINE HYDROCHLORIDE 25 MG/1
1 TABLET ORAL ONCE
Status: DISCONTINUED | OUTPATIENT
Start: 2020-12-04 | End: 2020-12-04 | Stop reason: SDUPTHER

## 2020-12-04 RX ADMIN — ATORVASTATIN CALCIUM 40 MG: 40 TABLET, FILM COATED ORAL at 20:58

## 2020-12-04 RX ADMIN — SODIUM CHLORIDE 1000 ML: 0.9 INJECTION, SOLUTION INTRAVENOUS at 19:00

## 2020-12-04 RX ADMIN — Medication 6000 UNITS: at 20:56

## 2020-12-04 RX ADMIN — GUAIFENESIN AND DEXTROMETHORPHAN 10 ML: 100; 10 SYRUP ORAL at 16:09

## 2020-12-04 RX ADMIN — APIXABAN 2.5 MG: 2.5 TABLET, FILM COATED ORAL at 20:58

## 2020-12-04 RX ADMIN — INSULIN LISPRO 1 UNITS: 100 INJECTION, SOLUTION INTRAVENOUS; SUBCUTANEOUS at 22:37

## 2020-12-04 RX ADMIN — IOPAMIDOL 80 ML: 755 INJECTION, SOLUTION INTRAVENOUS at 18:27

## 2020-12-04 RX ADMIN — IPRATROPIUM BROMIDE AND ALBUTEROL SULFATE 1 AMPULE: .5; 3 SOLUTION RESPIRATORY (INHALATION) at 16:10

## 2020-12-04 RX ADMIN — ZINC SULFATE 220 MG (50 MG) CAPSULE 50 MG: CAPSULE at 20:57

## 2020-12-04 RX ADMIN — Medication 40 MG: at 17:16

## 2020-12-04 ASSESSMENT — ENCOUNTER SYMPTOMS
BACK PAIN: 1
ALLERGIC/IMMUNOLOGIC NEGATIVE: 1
COUGH: 1
ABDOMINAL DISTENTION: 0
STRIDOR: 0
DIARRHEA: 0
CONSTIPATION: 1
CHEST TIGHTNESS: 0
NAUSEA: 0
SORE THROAT: 0
WHEEZING: 0
ABDOMINAL PAIN: 0
CHOKING: 0
APNEA: 0
SHORTNESS OF BREATH: 1
VOMITING: 0
COLOR CHANGE: 0
RHINORRHEA: 0

## 2020-12-04 ASSESSMENT — PAIN SCALES - GENERAL
PAINLEVEL_OUTOF10: 0
PAINLEVEL_OUTOF10: 0

## 2020-12-04 NOTE — ED NOTES
Pt ambulated in the noonan. Pt's O2 Sats were 89-92% while ambulating. Pt stated he did feel more SOB with ambulation. Pt back to room and IV placed labs drawn. Pt updated on POC will continue to monitor.       Maira Puentes RN  12/04/20 9346

## 2020-12-04 NOTE — ED PROVIDER NOTES
[address] ENCOUNTER          PHYSICIAN ASSISTANT NOTE       Date of evaluation: 12/4/2020    Chief Complaint     Shortness of Breath; Cough; and Back Pain      History of Present Illness     Iggy Jimenez is a 63 y.o. male, with a history of GERD, hypercholesterolemia, anxiety and recent positive Covid test 2 weeks ago, who presents to the ED with complaints of increasing shortness of breath and productive cough over the last 3 to 4 days. The patient states his main presenting symptoms for Covid were headache and fever which have resolved, as well as cough. Taking a deep breath now elicits cough for him. He also has developed an intermittent aching discomfort in his right mid back over the last 3 to 4 days that seems positional.  He denies pleuritic or exertional component. Has had no lower extremity pain or swelling. He otherwise has no complaints. Review of Systems     Review of Systems   Constitutional: Negative for chills and fever. HENT: Negative for congestion, rhinorrhea and sore throat. Respiratory: Positive for cough and shortness of breath. Cardiovascular: Negative for chest pain, palpitations and leg swelling. Gastrointestinal: Negative for abdominal pain, diarrhea and vomiting. Genitourinary: Negative for dysuria, frequency and urgency. Musculoskeletal: Positive for back pain (right middle). Negative for arthralgias and myalgias. Skin: Negative for color change and rash. Neurological: Negative for dizziness, light-headedness and headaches. All other systems reviewed and are negative. Past Medical, Surgical, Family, and Social History     He has a past medical history of Arthritis, Chicken pox, Chronic GERD, COVID-19 virus infection, Diabetes mellitus ([address] Dizziness, CORTES (generalized anxiety disorder), Gout, Hyperlipidemia, Obstructive sleep apnea syndrome, Panic attacks, Rash, and Vitamin D deficiency.   He has a past surgical history that includes knee surgery; Hand surgery; [place] tooth extraction; Tonsillectomy; Colonoscopy (2017); meniscectomy (Left); Knee arthroscopy (Right, 09/14/2017); and Upper gastrointestinal endoscopy (N/A, 9/10/2018). His family history includes Elevated Lipids in his mother; High Cholesterol in his mother; No Known Problems in his brother, brother, father, maternal grandfather, maternal grandmother, paternal grandfather, paternal grandmother, and sister. He reports that he has never smoked. He has never used smokeless tobacco. He reports that he does not drink alcohol or use drugs. Medications     Previous Medications    No medications on file       Allergies     He has No Known Allergies. Physical Exam     INITIAL VITALS: BP: (!) 148/90, Temp: 98.8 °F (37.1 °C), Pulse: 98, Resp: 26, SpO2: 95 %  Physical Exam  Vitals signs reviewed. Constitutional:       General: He is not in acute distress. Appearance: He is well-developed. He is obese. HENT:      Head: Normocephalic and atraumatic. Mouth/Throat:      Mouth: Mucous membranes are moist.   Eyes:      Extraocular Movements: Extraocular movements intact. Conjunctiva/sclera: Conjunctivae normal.   Neck:      Musculoskeletal: Normal range of motion and neck supple. Cardiovascular:      Rate and Rhythm: Normal rate and regular rhythm. Heart sounds: No murmur. No friction rub. No gallop. Pulmonary:      Effort: Pulmonary effort is normal. No respiratory distress. Breath sounds: Examination of the right-lower field reveals rales. Rales present. No wheezing or rhonchi. Comments: Deep breath elicits cough. Abdominal:      General: There is no distension. Palpations: Abdomen is soft. Tenderness: There is no abdominal tenderness. Musculoskeletal:      Right lower leg: No edema. Left lower leg: No edema. Skin:     General: Skin is warm and dry. Findings: No petechiae or rash.    Neurological: PA  12/04/20 1637    I spoke with the hospitalist regarding admission and he will accept the patient but will wait for labs to result before putting in orders, specifically the D-dimer to determine whether or not he needs additional evaluation/management for PE.      Marie Oglesby  12/04/20 3813

## 2020-12-04 NOTE — ED NOTES
Pt is alert and oriented times four. Pt here today for increased SOB worsening cough and back pain. Pt tested positive for covid 19 on 11/22. Pt states he is having increased SOB and back pain. Pt states he has a productive cough at times. Pt states the SOB is worse with exertion. Pt LS are decreased with an occasional wheezing. Pt placed on monitor. Call light in reach will continue to monitor.       Kelley Bradley RN  12/04/20 Georgi Rubio, RN  12/04/20 [address] Deisy Santos RN  12/04/20 0200

## 2020-12-04 NOTE — H&P
Internal Medicine PGY-3 Resident History & Physical      PCP: Shellie Montanez MD    Date of Admission: 12/4/2020    Date of Service: Pt seen/examined on 12/4/2020 and Admitted to Inpatient with expected LOS greaterthan two midnights due to medical therapy. Chief Complaint:  Shortness of breath       History Of Present Illness:      63 y.o. male with PMHx HLD, GERD, anxiety and recent COVID positive test (11/22) who presented to [facility] [place]note 2[place] with complaints of increasing shortness of breath and productive cough . His main presenting symptoms for COVID were headache, myalgias and chills. He first became symptomatic on 11/15 after driving in a car with a friend who was COVID-positive. He came to the emergency department on 11/22 when he first got tested and was discharged the same day. His symptoms have since progressively worsened. He still has his sense of taste and smell. He has decreased PO intake and poor appetite for the past three to four days. He also complains of mid-thoracic back pain with deep inspiration, improved with leaning forward. He denies any lower extremity swelling and does not have a prior history of blood clots. He is a non-smoker and does not drink alcohol. In ED, vitals stable, however pulse-ox was borderline for the most part and would desat into the high 80s with minimal exertion. At rest he maintains SpO2 at around 90% on room air. He was given a DuoNeb, 40mg IV Solu-Medrol and Robitussin-DM. D-dimer was elevated at 401. CTPA was obtained which did not show evidence of pulmonary embolism.      Past Medical History:          Diagnosis Date    Arthritis     Chicken pox     Chronic GERD 7/19/2018    COVID-19 virus infection 11/22/2020    Diabetes mellitus ([address]     per H&P    Dizziness     CORTES (generalized anxiety disorder)     Gout     Hyperlipidemia     Obstructive sleep apnea syndrome 08/08/2016    doesn't use CPAP    Panic attacks     Pneumonia due to COVID-19 virus 12/4/2020    Rash     Vitamin D deficiency 8/3/2015       Past Surgical History:          Procedure Laterality Date    COLONOSCOPY  2017    Per pt' was nml.  HAND SURGERY      KNEE ARTHROSCOPY Right 09/14/2017    RIGHT KNEE ARTHROSCOPY WITH PARTIAL MEDIAL AND LATERAL MENISCECTOMY,CHONDROPLASTY,SYNOVECTOMY    KNEE SURGERY      left meniscus    MENISCECTOMY Left     TONSILLECTOMY      UPPER GASTROINTESTINAL ENDOSCOPY N/A 9/10/2018    EGD BIOPSY performed by Sergio Dobbs DO at [facility]         Medications Prior to Admission:      Prior to Admission medications    Not on File       Allergies:  Patient has no known allergies. Social History:      The patient currently lives at home. TOBACCO:   reports that he has never smoked. He has never used smokeless tobacco.  ETOH:  reports no history of alcohol use. Family History:     Reviewed in detail and negative for DM, CAD, Cancer, CVA. Positive as follows:        Problem Relation Age of Onset    Elevated Lipids Mother     High Cholesterol Mother     No Known Problems Father     No Known Problems Brother     No Known Problems Brother     No Known Problems Sister     No Known Problems Maternal Grandmother     No Known Problems Maternal Grandfather     No Known Problems Paternal Grandmother     No Known Problems Paternal Grandfather        REVIEW OF SYSTEMS: Pertinent positives as noted in the HPI. All other systems reviewed and negative. ROS: Review of Systems   Constitutional: Positive for appetite change, chills and fatigue. Negative for activity change, diaphoresis, fever and unexpected weight change. HENT: Negative. Negative for congestion and rhinorrhea. Respiratory: Positive for cough and shortness of breath. Negative for apnea, choking, chest tightness, wheezing and stridor. Cardiovascular: Positive for chest pain. Negative for palpitations and leg swelling.    Gastrointestinal: Positive for constipation. Negative for abdominal distention, diarrhea, nausea and vomiting. Endocrine: Negative. Genitourinary: Negative. Negative for dysuria and urgency. Musculoskeletal: Positive for back pain and myalgias. Negative for arthralgias, gait problem, joint swelling, neck pain and neck stiffness. Skin: Negative. Allergic/Immunologic: Negative. Neurological: Positive for headaches. Hematological: Negative. Psychiatric/Behavioral: Negative. PHYSICAL EXAM PERFORMED:    BP (!) 142/100   Pulse 110   Temp 99.4 °F (37.4 °C) (Oral)   Resp 25   Ht 5' 8\" (1.727 m)   Wt (!) 315 lb (142.9 kg)   SpO2 97%   BMI 47.90 kg/m²     General appearance:  Obese. No apparent distress, appears stated age and cooperative. Obese. HEENT:  Normal cephalic,atraumatic without obvious deformity. Pupils equal, round, and reactive to light. Extra ocular muscles intact. Conjunctivae/corneas clear. Neck: Acanthosis nigricans. Supple, with full range of motion. No jugular venous distention. Trachea midline. Respiratory:  Normal respiratory effort. Bibasilar rhonchi, pleuritic chest pain elicited with deep inspiration. Cardiovascular:  Regular rate and rhythm with normal S1/S2 without murmurs, rubs or gallops. No tenderness to chest palpation. Abdomen: Soft, non-tender, non-distended with normal bowel sounds. Musculoskeletal:  No clubbing, cyanosis oredema bilaterally. Full range of motion without deformity. Skin: Skin color, texture, turgor normal.  No rashes or lesions. Neurologic:  Neurovascularly intact without any focal sensory/motor deficits.  Cranialnerves: II-XII intact, grossly non-focal.  Psychiatric:  Alert and oriented, thought content appropriate,normal insight  Capillary Refill: Brisk,< 3 seconds   Peripheral Pulses: +2 palpable, equal bilaterally     Labs:     Recent Labs     12/04/20  1637   WBC 5.2   HGB 14.7   HCT 45.0        Recent Labs     12/04/20  1637      K 4.1   CL 96*   CO2 28   BUN 11   CREATININE 1.1   CALCIUM 9.9     Recent Labs     12/04/20  1637   AST 41*   ALT 43*   BILITOT 0.6   ALKPHOS 72     Recent Labs     12/04/20  1637   INR 1.11     Recent Labs     12/04/20  1637   TROPONINI <0.01       Urinalysis:      Lab Results   Component Value Date    BLOODU neg 05/24/2017    SPECGRAV 1.015 05/24/2017    GLUCOSEU neg 05/24/2017       Radiology:     CXR: I have reviewed the CXR with the following interpretation: patchy bilateral ground-glass infiltrates  EKG:  I have reviewed the EKG with the following interpretation: NSR    CT CHEST PULMONARY EMBOLISM W CONTRAST   Final Result      1. No evidence of any proximal pulmonary thromboembolus, aneurysm or dissection. 2. Bilateral diffuse nonsegmental patchy groundglass airspace disease consistent with bilateral Covid pneumonia without pleural effusions or pneumothorax   3. No mediastinal mass or significant lymphadenopathy. Several scattered lymph nodes are borderline in size      XR CHEST PORTABLE   Final Result   1. There are patchy bilateral groundglass infiltrates. Correlate for multifocal pneumonia. Covid-19 pneumonia can be associated with this radiographic appearance. ASSESSMENT & PLAN:    Anshu Carrera is a 63 y.o. male w/ PMHx HLD admitted for SOB and new oxygen requirement due to COVID pneumonia. Active Hospital Problems    Diagnosis Date Noted    Pneumonia due to COVID-19 virus [U07.1, J12.89] 12/04/2020     Acute hypoxic respiratory failure 2/2 COVID-19 PNA  -currently satting 90% on RA, and desaturating with exertion/ambulation  -CXR with diffuse ground glass opacities. D-dimer 401.  CTPA negative for pulmonary embolism  -COVID positive on 11/22/2020  -daily inflammatory markers: D-dimer, CRP, ferritin, fibrinogen   -decadron 6 mg IV qD x 10 days  -f/u blood cultures, respiratory cultures, MRSA nares, resp. viral panel  -continue supplemental oxygen, goal SpO2 >92%  -PT/OT  -given elevated inflammatory markers, elevated D-dimer, positive COVID infection, and morbid obesity, will start the patient on prophylactic anticoagulation with Eliquis 2.5 mg PO BID for 14 days. Pre-diabetes  -HgbA1c 6.0% on 7/2/2020  -low-dose sliding scale with meals  -closely monitor given patient started on steroids    HLD  -ASCVD risk algorithm: 8.9% risk of cardiovascular event in the next 10 years  -lipitor 40 mg PO qHs started     Morbid Obesity  Obesity (Body mass index is 83.28UH/W8) - Complicating assessment and treatment. Placing patient at risk for multiple co-morbidities as well as early death and contributing to the patient's presentation. DVT Prophylaxis: lovenox  Diet: DIET GENERAL;  Code Status: Full Code    PT/OT Eval Status: ordered    Dispo - GMF    I will discuss the patient with Dr. Marietta Alves MD.    Margaret Dakin, MD  Internal Medicine Resident,PGY-3    I have personally seen and evaluated this patient. I discussed findings and management with the resident, on 12/04/20  and agree as documented in this note. 39-year-old male presents to the ED with complains of shortness of breath, progressively worse along with right posterior lower rib pain which initially thought was kidney pain, which is worse with deep inspiration. He was diagnosed with COVID-19 2 weeks ago, and has been in quarantine at home. Says he likely got it from a Meet & Greet 3 weeks ago. He is 90% on room air but with minimal exertion in bed O2 Sats drop to 86 to 87%. With right-sided pleuritic chest pain, hypoxemia, tachycardia, progressive shortness of breath, D-dimer > 400, CTPA was done which ruled out acute pulmonary embolism but showed diffuse groundglass opacities consistent with COVID-19 pneumonia. We will start him on Decadron 6 mg once daily, patient is much as possible, DVT prophylactic dose 2.5 g Eliquis twice daily. Admit as inpatient.  I anticipate hospitalization spanning more than two midnights for investigation and treatment of the above medically necessary diagnoses.     [facility]  Attending Physician

## 2020-12-04 NOTE — TELEPHONE ENCOUNTER
[facility]    Patient called pre-service center [facility]) [place] with red flag complaint. Brief description of triage: see above     Triage indicates for patient to go to ED now for SOB even at rest    Care advice provided, patient verbalizes understanding; denies any other questions or concerns; instructed to call back for any new or worsening symptoms. Attention Provider: Thank you for allowing me to participate in the care of your patient. The patient was connected to triage in response to information provided to the [facility]. Please do not respond through this encounter as the response is not directed to a shared pool.

## 2020-12-04 NOTE — ED PROVIDER NOTES
ED Attending Attestation Note     Date of evaluation: 12/4/2020    This patient was seen by the advance practice provider. I have seen and examined the patient, agree with the workup, evaluation, management and diagnosis. The care plan has been discussed. I have reviewed the ECG and concur with the EWNDY's interpretation. My assessment reveals patient with chief complaint of vomiting, cough, congestion and shortness of breath. .  On exam tachycardic and chest with rales bilateral.       John Caicedo MD  12/07/20 [id number] [facility] Olimpia Cuevas MD  12/07/20 3333

## 2020-12-05 VITALS
SYSTOLIC BLOOD PRESSURE: 143 MMHG | TEMPERATURE: 98.1 F | DIASTOLIC BLOOD PRESSURE: 92 MMHG | OXYGEN SATURATION: 95 % | HEART RATE: 108 BPM | WEIGHT: 315 LBS | HEIGHT: 68 IN | BODY MASS INDEX: 47.74 KG/M2 | RESPIRATION RATE: 20 BRPM

## 2020-12-05 LAB
ANION GAP SERPL CALCULATED.3IONS-SCNC: 14 MMOL/L (ref 3–16)
BASOPHILS ABSOLUTE: 0.1 K/UL (ref 0–0.2)
BASOPHILS RELATIVE PERCENT: 1.6 %
BUN BLDV-MCNC: 11 MG/DL (ref 7–20)
C-REACTIVE PROTEIN: 77.6 MG/L (ref 0–5.1)
CALCIUM SERPL-MCNC: 10 MG/DL (ref 8.3–10.6)
CHLORIDE BLD-SCNC: 100 MMOL/L (ref 99–110)
CO2: 24 MMOL/L (ref 21–32)
CREAT SERPL-MCNC: 0.9 MG/DL (ref 0.9–1.3)
D DIMER: 320 NG/ML DDU (ref 0–229)
EOSINOPHILS ABSOLUTE: 0 K/UL (ref 0–0.6)
EOSINOPHILS RELATIVE PERCENT: 0 %
FERRITIN: 476.5 NG/ML (ref 30–400)
FIBRINOGEN: 632 MG/DL (ref 200–397)
GFR AFRICAN AMERICAN: >60
GFR NON-AFRICAN AMERICAN: >60
GLUCOSE BLD-MCNC: 127 MG/DL (ref 70–99)
GLUCOSE BLD-MCNC: 157 MG/DL (ref 70–99)
GLUCOSE BLD-MCNC: 167 MG/DL (ref 70–99)
GLUCOSE BLD-MCNC: 231 MG/DL (ref 70–99)
HCT VFR BLD CALC: 43.1 % (ref 40.5–52.5)
HEMOGLOBIN: 14 G/DL (ref 13.5–17.5)
L. PNEUMOPHILA SEROGP 1 UR AG: NORMAL
LYMPHOCYTES ABSOLUTE: 0.6 K/UL (ref 1–5.1)
LYMPHOCYTES RELATIVE PERCENT: 13.6 %
MCH RBC QN AUTO: 29.5 PG (ref 26–34)
MCHC RBC AUTO-ENTMCNC: 32.5 G/DL (ref 31–36)
MCV RBC AUTO: 91 FL (ref 80–100)
MONOCYTES ABSOLUTE: 0.3 K/UL (ref 0–1.3)
MONOCYTES RELATIVE PERCENT: 7.4 %
NEUTROPHILS ABSOLUTE: 3.4 K/UL (ref 1.7–7.7)
NEUTROPHILS RELATIVE PERCENT: 77.4 %
PDW BLD-RTO: 14.6 % (ref 12.4–15.4)
PERFORMED ON: ABNORMAL
PLATELET # BLD: 359 K/UL (ref 135–450)
PMV BLD AUTO: 8.2 FL (ref 5–10.5)
POTASSIUM REFLEX MAGNESIUM: 4.6 MMOL/L (ref 3.5–5.1)
RBC # BLD: 4.74 M/UL (ref 4.2–5.9)
SODIUM BLD-SCNC: 138 MMOL/L (ref 136–145)
STREP PNEUMONIAE ANTIGEN, URINE: NORMAL
VITAMIN D 25-HYDROXY: 27.3 NG/ML
WBC # BLD: 4.3 K/UL (ref 4–11)

## 2020-12-05 PROCEDURE — 87040 BLOOD CULTURE FOR BACTERIA: CPT

## 2020-12-05 PROCEDURE — 2500000003 HC RX 250 WO HCPCS: Performed by: STUDENT IN AN ORGANIZED HEALTH CARE EDUCATION/TRAINING PROGRAM

## 2020-12-05 PROCEDURE — 82728 ASSAY OF FERRITIN: CPT

## 2020-12-05 PROCEDURE — 85379 FIBRIN DEGRADATION QUANT: CPT

## 2020-12-05 PROCEDURE — 2580000003 HC RX 258: Performed by: STUDENT IN AN ORGANIZED HEALTH CARE EDUCATION/TRAINING PROGRAM

## 2020-12-05 PROCEDURE — 85384 FIBRINOGEN ACTIVITY: CPT

## 2020-12-05 PROCEDURE — 86140 C-REACTIVE PROTEIN: CPT

## 2020-12-05 PROCEDURE — 80048 BASIC METABOLIC PNL TOTAL CA: CPT

## 2020-12-05 PROCEDURE — 85025 COMPLETE CBC W/AUTO DIFF WBC: CPT

## 2020-12-05 PROCEDURE — 6360000002 HC RX W HCPCS: Performed by: STUDENT IN AN ORGANIZED HEALTH CARE EDUCATION/TRAINING PROGRAM

## 2020-12-05 PROCEDURE — 36415 COLL VENOUS BLD VENIPUNCTURE: CPT

## 2020-12-05 PROCEDURE — 6370000000 HC RX 637 (ALT 250 FOR IP): Performed by: INTERNAL MEDICINE

## 2020-12-05 PROCEDURE — 6370000000 HC RX 637 (ALT 250 FOR IP): Performed by: STUDENT IN AN ORGANIZED HEALTH CARE EDUCATION/TRAINING PROGRAM

## 2020-12-05 RX ORDER — CHOLECALCIFEROL (VITAMIN D3) 50 MCG
2000 TABLET ORAL DAILY
Qty: 60 TABLET | Refills: 0 | Status: SHIPPED | OUTPATIENT
Start: 2020-12-06

## 2020-12-05 RX ORDER — DEXAMETHASONE 6 MG/1
6 TABLET ORAL
Qty: 7 TABLET | Refills: 0 | Status: SHIPPED | OUTPATIENT
Start: 2020-12-05 | End: 2020-12-12

## 2020-12-05 RX ADMIN — INSULIN LISPRO 2 UNITS: 100 INJECTION, SOLUTION INTRAVENOUS; SUBCUTANEOUS at 10:46

## 2020-12-05 RX ADMIN — Medication 6000 UNITS: at 08:16

## 2020-12-05 RX ADMIN — INSULIN LISPRO 1 UNITS: 100 INJECTION, SOLUTION INTRAVENOUS; SUBCUTANEOUS at 13:08

## 2020-12-05 RX ADMIN — DEXAMETHASONE SODIUM PHOSPHATE 6 MG: 10 INJECTION, SOLUTION INTRAMUSCULAR; INTRAVENOUS at 08:17

## 2020-12-05 RX ADMIN — LABETALOL HYDROCHLORIDE 5 MG: 5 INJECTION, SOLUTION INTRAVENOUS at 03:20

## 2020-12-05 RX ADMIN — Medication 10 ML: at 08:18

## 2020-12-05 RX ADMIN — APIXABAN 2.5 MG: 2.5 TABLET, FILM COATED ORAL at 08:16

## 2020-12-05 ASSESSMENT — PAIN SCALES - GENERAL
PAINLEVEL_OUTOF10: 0

## 2020-12-05 NOTE — PLAN OF CARE
Problem: Gas Exchange - Impaired  Goal: Absence of hypoxia  Outcome: Ongoing  Note: SpO2 in low 90s on room air. Pt encouraged to notify RN if experiencing SOB. Lung sounds diminished. COVID test positive on 11/22. Problem: Isolation Precautions - Risk of Spread of Infection  Goal: Prevent transmission of infection  Outcome: Ongoing  Note: Pt educated on COVID-19 infection and medications given for infection. Pt encouraged to practice cough etiquette and frequently practice hand hygiene. Pt aware of no visitor policy and is encouraged to ask questions regarding treatment and infection.

## 2020-12-05 NOTE — PROGRESS NOTES
Pt transferred from ED to room 8861. Vital signs obtained, documented in flowsheets. Oxygen saturation 97% on room air. Pt oriented to room and call light and encouraged to call RN before getting out of chair. Fluid bolus infusing as ordered and initiated in ED. Call light and belongings are within reach. Will monitor.  Electronically signed by Elsi Cid RN on 12/4/2020 at 6:55 PM

## 2020-12-05 NOTE — PROGRESS NOTES
[facility]    Respiratory Therapy     Home Oxygen Evaluation        Name: Alia Jung  Medical Record Number: [id number]  Age: 63 y.o.   Gender:  male   : 1965  Today's date: 2020  Room: [facility]4456-01      Assessment        BP (!) 143/92   Pulse 108   Temp 98.1 °F (36.7 °C) (Oral)   Resp 20   Ht 5' 8\" (1.727 m)   Wt (!) 323 lb 3.2 oz (146.6 kg)   SpO2 95%   BMI 49.14 kg/m²     Patient Active Problem List   Diagnosis    Staph skin infection    Microalbuminuria    Hyperglycemia    Polyuria    Failure of erection    Chronic kidney disease, stage II (mild)    Benign paroxysmal positional nystagmus    Erectile dysfunction    Vitamin D deficiency    Allergic rhinitis    Benign paroxysmal positional vertigo    Morbid obesity with BMI of 50.0-59.9, adult (HCC)    HLD (hyperlipidemia)    Obstructive sleep apnea syndrome    Panic attack    Controlled type 2 diabetes mellitus without complication, without long-term current use of insulin ([facility])    Primary osteoarthritis of both knees    Complex tear of lateral meniscus of right knee as current injury    Complex tear of medial meniscus of right knee as current injury    Degenerative disc disease, cervical    Idiopathic chronic gout of multiple sites without tophus    Chronic GERD    Hemorrhoid    Numbness    Hand pain, right    Arthritis of carpometacarpal (CMC) joint of right thumb    Enlarged lymph node    Groin pain, left    Pneumonia due to COVID-19 virus       Social History:  Social History     Tobacco Use    Smoking status: Never Smoker    Smokeless tobacco: Never Used   Substance Use Topics    Alcohol use: No     Alcohol/week: 0.0 standard drinks     Comment: rarely    Drug use: No       Patient Room Air saturation at rest 95  %  Patient Room Air saturation upon ambulation 90 %    Oxygen saturations of 88% or less on RA qualifies patient for Home Oxygen    Patient resting on 0  lmp  with an oxygen saturation of 95 %     Patient ambulated on 0 lpm with an oxygen saturation of 90%       Qualifying patient for home oxygen with ambulation and continuous flow  @ 0 lpm.      In your clinical assessment does the Patient Require Portable Oxygen Tanks? No: Remained > than 88% on room air with ambulation.                 Patient/caregiver was educated on Home Oxygen process:  Yes      Level of patient/caregiver understanding able to:   [x] Verbalize understanding   [] Demonstrate understanding       [] Teach back        [] Needs reinforcement        []  No available caregiver               []  Other:     Response to education:  Very Good     Time Spent with Home O2 Set Up:  10  minutes     Johnny Harrison RCP on 12/5/2020 at 4:57 PM                 .

## 2020-12-05 NOTE — PROGRESS NOTES
Pt observed ambulating in room off oxygen after several minutes. Saturation is 92 to 98 percent. Pt has no complaints of SOB.

## 2020-12-05 NOTE — PROGRESS NOTES
Called Deborah, pt's Mom, and updated her on pt status and POC.     Electronically signed by Fernando Valentin RN on 38/5/4339 at 6:29 AM

## 2020-12-05 NOTE — PROGRESS NOTES
4 Eyes Skin Assessment     The patient is being assess for  Admission    I agree that 2 RN's have performed a thorough Head to Toe Skin Assessment on the patient. ALL assessment sites listed below have been assessed. Areas assessed by both nurses:   [x]   Head, Face, and Ears   [x]   Shoulders, Back, and Chest  [x]   Arms, Elbows, and Hands   [x]   Coccyx, Sacrum, and Ischum  [x]   Legs, Feet, and Heels        Does the Patient have Skin Breakdown?   No         Luis Prevention initiated:  No   Wound Care Orders initiated:  No      [facility] nurse consulted for Pressure Injury (Stage 3,4, Unstageable, DTI, NWPT, and Complex wounds):  NA      Nurse 1 eSignature: Electronically signed by Yamilet Xie RN on 10[birth year] at 8:55 PM EST    **SHARE this note so that the co-signing nurse is able to place an eSignature**    Nurse 2 eSignature: Electronically signed by Marlys Dobbins RN on 12/5/20 at 12:24 AM EST

## 2020-12-06 NOTE — DISCHARGE SUMMARY
embolism. Patient was started on Decadron, infectious work-up including blood cultures blood cultures MRSA nares, respiratory viral panel were done. During the course of hospitalization he was weaned off oxygen, prior to discharge to home after evaluation was done and did not qualify for oxygen. He was asked to continue Eliquis 2.5 mg twice daily x28 days, and decadron x 1 week. Patient was discharged to medication      Physical Exam Performed:     BP (!) 143/92   Pulse 108   Temp 98.1 °F (36.7 °C) (Oral)   Resp 20   Ht 5' 8\" (1.727 m)   Wt (!) 323 lb 3.2 oz (146.6 kg)   SpO2 95%   BMI 49.14 kg/m²     General appearance:  Obese. No apparent distress, appears stated age and cooperative. Obese.   HEENT:  Normal cephalic,atraumatic without obvious deformity. Pupils equal, round, and reactive to light.  Extra ocular muscles intact. Conjunctivae/corneas clear. Neck: Acanthosis nigricans. Supple, with full range of motion. No jugular venous distention. Trachea midline. Respiratory:  Normal respiratory effort. Bibasilar rhonchi, pleuritic chest pain elicited with deep inspiration. Cardiovascular:  Regular rate and rhythm with normal S1/S2 without murmurs, rubs or gallops. No tenderness to chest palpation.   Abdomen: Soft, non-tender, non-distended with normal bowel sounds. Musculoskeletal:  No clubbing, cyanosis oredema bilaterally.  Full range of motion without deformity. Skin: Skin color, texture, turgor normal.  No rashes or lesions. Neurologic:  Neurovascularly intact without any focal sensory/motor deficits. Cranialnerves: II-XII intact, grossly non-focal.  Psychiatric:  Alert and oriented, thought content appropriate,normal insight  Capillary Refill: Brisk,< 3 seconds   Peripheral Pulses: +2 palpable, equal bilaterally     Labs:  For convenience and continuity at follow-up the following most recent labs are provided:      CBC:    Lab Results   Component Value Date    WBC 4.3 12/05/2020    HGB 14.0 12/05/2020    HCT 43.1 12/05/2020     12/05/2020       Renal:    Lab Results   Component Value Date     12/05/2020    K 4.6 12/05/2020     12/05/2020    CO2 24 12/05/2020    BUN 11 12/05/2020    CREATININE 0.9 12/05/2020    CALCIUM 10.0 12/05/2020    PHOS 3.2 05/24/2017         Significant Diagnostic Studies    Radiology:   CT CHEST PULMONARY EMBOLISM W CONTRAST   Final Result      1. No evidence of any proximal pulmonary thromboembolus, aneurysm or dissection. 2. Bilateral diffuse nonsegmental patchy groundglass airspace disease consistent with bilateral Covid pneumonia without pleural effusions or pneumothorax   3. No mediastinal mass or significant lymphadenopathy. Several scattered lymph nodes are borderline in size      XR CHEST PORTABLE   Final Result   1. There are patchy bilateral groundglass infiltrates. Correlate for multifocal pneumonia. Covid-19 pneumonia can be associated with this radiographic appearance. Consults:     IP CONSULT TO HOSPITALIST    Disposition:  Home     Condition at Discharge: Stable    Discharge Instructions/Follow-up:    Continue controlled, continue Eliquis prophylactic dose    Code Status:  Full Code    Activity: activity as tolerated    Diet: diabetic diet      Discharge Medications:     Discharge Medication List as of 12/5/2020  4:58 PM           Details   dexamethasone (DECADRON) 6 MG tablet Take 1 tablet by mouth daily (with breakfast) for 7 days, Disp-7 tablet,R-0Print      Vitamin D (CHOLECALCIFEROL) 50 MCG (2000 UT) TABS tablet Take 1 tablet by mouth daily Therapeutic Dose., Disp-60 tablet,R-0Labeling may look different. 25 mcg=1000 Units. Please double check dosages. Print              Details   apixaban (ELIQUIS) 2.5 MG TABS tablet Take 1 tablet by mouth 2 times daily for 28 days, Disp-56 tablet,R-0Print             Time Spent on discharge is more than 30 minutes in the examination, evaluation, counseling and review of medications and discharge plan. Signed:    María Momin MD   12/6/2020      Thank you Donna Long MD for the opportunity to be involved in this patient's care. If you have any questions or concerns please feel free to contact me at [phone].

## 2020-12-08 ENCOUNTER — CARE COORDINATION (OUTPATIENT)
Dept: CASE MANAGEMENT | Age: 55
End: 2020-12-08

## 2020-12-08 LAB
CULTURE, RESPIRATORY: NORMAL
GRAM STAIN RESULT: NORMAL

## 2020-12-08 NOTE — CARE COORDINATION
Date/Time:  12/8/2020 12:28 PM  Attempted to reach patient by telephone. Call within 2 business days of discharge: Yes, Unable to LVM, Mailbox is full call. Will attempt to reach patient again.     Thank Mallory Bull RN  Care Transition Coordinator  [facility][phone]

## 2020-12-08 NOTE — CARE COORDINATION
Advance Care Planning  People with COVID-19 may have no symptoms, mild symptoms, such as fever, cough, and shortness of breath or they may have more severe illness, developing severe and fatal pneumonia. As a result, Advance Care Planning with attention to naming a health care decision maker (someone you trust to make healthcare decisions for you if you could not speak for yourself) and sharing other health care preferences is important BEFORE a possible health crisis. Please contact your Primary Care Provider to discuss Advance Care Planning. Preventing the Spread of Coronavirus Disease 2019 in Homes and Residential Communities  For the most recent information go to [employer]    Prevention steps for People with confirmed or suspected COVID-19 (including persons under investigation) who do not need to be hospitalized  and   People with confirmed COVID-19 who were hospitalized and determined to be medically stable to go home    Your healthcare provider and public health staff will evaluate whether you can be cared for at home. If it is determined that you do not need to be hospitalized and can be isolated at home, you will be monitored by staff from your local or state health department. You should follow the prevention steps below until a healthcare provider or local or state health department says you can return to your normal activities. Stay home except to get medical care  People who are mildly ill with COVID-19 are able to isolate at home during their illness. You should restrict activities outside your home, except for getting medical care. Do not go to work, school, or public areas. Avoid using public transportation, ride-sharing, or taxis. Separate yourself from other people and animals in your home  People: As much as possible, you should stay in a specific room and away from other people in your home.  Also, you should use a separate bathroom, if available. Animals: You should restrict contact with pets and other animals while you are sick with COVID-19, just like you would around other people. Although there have not been reports of pets or other animals becoming sick with COVID-19, it is still recommended that people sick with COVID-19 limit contact with animals until more information is known about the virus. When possible, have another member of your household care for your animals while you are sick. If you are sick with COVID-19, avoid contact with your pet, including petting, snuggling, being kissed or licked, and sharing food. If you must care for your pet or be around animals while you are sick, wash your hands before and after you interact with pets and wear a facemask. Call ahead before visiting your doctor  If you have a medical appointment, call the healthcare provider and tell them that you have or may have COVID-19. This will help the healthcare providers office take steps to keep other people from getting infected or exposed. Wear a facemask  You should wear a facemask when you are around other people (e.g., sharing a room or vehicle) or pets and before you enter a healthcare providers office. If you are not able to wear a facemask (for example, because it causes trouble breathing), then people who live with you should not stay in the same room with you, or they should wear a facemask if they enter your room. Cover your coughs and sneezes  Cover your mouth and nose with a tissue when you cough or sneeze. Throw used tissues in a lined trash can. Immediately wash your hands with soap and water for at least 20 seconds or, if soap and water are not available, clean your hands with an alcohol-based hand  that contains at least 60% alcohol.   Clean your hands often  Wash your hands often with soap and water for at least 20 seconds, especially after blowing your nose, coughing, or sneezing; going to the bathroom; and before eating or preparing food. If soap and water are not readily available, use an alcohol-based hand  with at least 60% alcohol, covering all surfaces of your hands and rubbing them together until they feel dry. Soap and water are the best option if hands are visibly dirty. Avoid touching your eyes, nose, and mouth with unwashed hands. Avoid sharing personal household items  You should not share dishes, drinking glasses, cups, eating utensils, towels, or bedding with other people or pets in your home. After using these items, they should be washed thoroughly with soap and water. Clean all high-touch surfaces everyday  High touch surfaces include counters, tabletops, doorknobs, bathroom fixtures, toilets, phones, keyboards, tablets, and bedside tables. Also, clean any surfaces that may have blood, stool, or body fluids on them. Use a household cleaning spray or wipe, according to the label instructions. Labels contain instructions for safe and effective use of the cleaning product including precautions you should take when applying the product, such as wearing gloves and making sure you have good ventilation during use of the product. Monitor your symptoms  Seek prompt medical attention if your illness is worsening (e.g., difficulty breathing). Before seeking care, call your healthcare provider and tell them that you have, or are being evaluated for, COVID-19. Put on a facemask before you enter the facility. These steps will help the healthcare providers office to keep other people in the office or waiting room from getting infected or exposed. Ask your healthcare provider to call the local or state health department. Persons who are placed under active monitoring or facilitated self-monitoring should follow instructions provided by their local health department or occupational health professionals, as appropriate. When working with your local health department check their available hours.   If you have a medical emergency and need to call 911, notify the dispatch personnel that you have, or are being evaluated for COVID-19. If possible, put on a facemask before emergency medical services arrive. Discontinuing home isolation  Patients with confirmed COVID-19 should remain under home isolation precautions until the risk of secondary transmission to others is thought to be low. The decision to discontinue home isolation precautions should be made on a case-by-case basis, in consultation with healthcare providers and state and local health departments. [facility] 45 Transitions Initial Follow Up Call    Call within 2 business days of discharge: Yes    Patient: Alphonse Baker Patient : 1965   MRN: [id number]   Reason for Admission: COVID- 19 Monitoring  Discharge Date: 20 RARS: Readmission Risk Score: 10      Last Discharge [address] [address]       Complaint Diagnosis Description Type Department Provider    20 Shortness of Breath; Cough; Back Pain Hypoxemia . .. ED to Hosp-Admission (Discharged) (ADMITTED) [address] Johnson Parsons MD; Hero Lawrence .. Spoke with: [address] [facility] Allison vd: [facility] [employer]     Non-face-to-face services provided:  Obtained and reviewed discharge summary and/or continuity of care documents  Education of patient/family/caregiver/guardian to support self-management-.   Assessment and support for treatment adherence and medication management-.    Care Transitions 24 Hour Call    Schedule Follow Up Appointment with PCP:  Declined  Do you have any ongoing symptoms?:  Yes  Patient-reported symptoms:  Shortness of Breath  Do you have a copy of your discharge instructions?:  Yes  Do you have all of your prescriptions and are they filled?:  Yes  Have you been contacted by a [facility] Pharmacist?:  No  Have you scheduled your follow up appointment?:  No  Were you discharged with any Home Care or Post Acute Services:  No  Do you feel like you have everything you need to keep you well at home?:  Yes  Care Transitions Interventions  No Identified Needs       Summary  CTN spoke with patient this afternoon for initial 24 hour discharge follow up [address] Monitoring call. Patient states he is doing well, reports no fevers, chills, nausea, vomiting or chest pain. Patient does report having some SOB, only with exertion. Patient is not on any Home O2, states he is spot checking O2 Sat, running around 93-95% on RA. CTN encouraged patient to continue to monitor for any increased SOB, as well as monitoring for any of the other, above listed s/s, reporting to MD immediately. CTN will follow up with patient again in about 14 days. CTN provided education on s/s that require medical attention and when to seek medical attention. CTN advised Pt of use of urgent care or physicians 24 hr access line if assistance is needed after hours or weekend. Pt denies any needs or concerns and is agreeable with additional calls. Follow Up  No future appointments.     Rich Sin RN

## 2020-12-09 ENCOUNTER — TELEPHONE (OUTPATIENT)
Dept: ADMINISTRATIVE | Age: 55
End: 2020-12-09

## 2020-12-09 LAB
BLOOD CULTURE, ROUTINE: NORMAL
CULTURE, BLOOD 2: NORMAL

## 2020-12-10 LAB
INFLUENZA A BY PCR: NOT DETECTED
INFLUENZA B BY PCR: NOT DETECTED
RSV BY PCR: NOT DETECTED
RSV SOURCE: NORMAL

## 2020-12-21 ENCOUNTER — CARE COORDINATION (OUTPATIENT)
Dept: CASE MANAGEMENT | Age: 55
End: 2020-12-21

## 2020-12-21 NOTE — CARE COORDINATION
You Patient resolved from the Care Transitions episode on 12/21/2020    Patient has not had a 2nd COVID-19 test, since discharging from [facility] has been provided the following resources and education related to COVID-19:                         Signs, symptoms and red flags related to COVID-19            CDC exposure and quarantine guidelines            [employer] exposure contact - [phone]            Contact for their local Department of Health                 Patient currently reports that the following symptoms have improved:  no new/worsening symptoms. Patient tiwht no other issues or concerns at this time. No further outreach scheduled with this CTN/ACM. Episode of Care resolved. Patient has this CTN/ACM contact information if future needs arise.     Thank Mo Gordon RN  Care Transition Coordinator  Contact [facility]:[phone]

## 2020-12-29 ENCOUNTER — TELEPHONE (OUTPATIENT)
Dept: FAMILY MEDICINE CLINIC | Age: 55
End: 2020-12-29

## 2020-12-29 NOTE — TELEPHONE ENCOUNTER
----- Message from Dana Chevy sent at 12/29/2020  9:35 AM EST -----  Subject: Hospital Follow Up    QUESTIONS  What hospital was the Patient Discharged from? [facility]  Date of Discharge? 2020-12-05  Discharge Location? Home  Reason for hospitalization as patient stated? Covid related   What question does the patient have   if applicable? PT had concerns of rapid heartbeat when exercising and   high BP   PT advised that he was told to make f/u appt with [facility].   please call to set appt   ---------------------------------------------------------------------------  --------------  [facility]  What is the best way for the office to contact you? OK to leave message on   voicemail  Preferred Call Back Phone Number? [phone]  ---------------------------------------------------------------------------  --------------  SCRIPT ANSWERS  Relationship to Patient? Self  Appointment reason? Well Care/Follow Ups  Select a Well Care/Follow Ups appointment reason? Adult Hospital Follow Up   [facility] Discharge]  (Patient requests to see provider urgently. )? No  (Has the patient been discharged from the hospital within 2 business days   AND does not have a Telephone Encounter  Follow Up From [address] [place] [address]   documented in [employer]?)? No  Do you have any questions for your primary care provider that need to be   answered prior to your appointment? (Use RN Triage if question pertains to   anything on the red flag list)? Yes  (Patient needs follow up visit after hospital discharge) Book first   available appointment within 7 days OF DISCHARGE   if no appt   proceed to book the next available time slot within 14 days OF DISCHARGE   AND Send Message to Provider. [facility] Follow Up appointment cannot be   booked beyond 14 Days and should result in a Message to Provider. ?  Yes

## 2020-12-30 NOTE — TELEPHONE ENCOUNTER
----- Message from Connor Saleem sent at 12/29/2020  3:35 PM EST -----  Subject: Message to Provider    QUESTIONS  Information for Provider? patient called in to return a call from the MA   to schedule his f/u er appt. please contact the pt   ---------------------------------------------------------------------------  --------------  CALL BACK INFO  What is the best way for the office to contact you? OK to leave message on   voicemail  Preferred Call Back Phone Number? [phone]  ---------------------------------------------------------------------------  --------------  SCRIPT ANSWERS  Relationship to Patient?  Self

## 2021-01-04 ENCOUNTER — TELEPHONE (OUTPATIENT)
Dept: FAMILY MEDICINE CLINIC | Age: 56
End: 2021-01-04

## 2021-01-04 DIAGNOSIS — M79.674 PAIN OF TOE OF RIGHT FOOT: ICD-10-CM

## 2021-01-04 DIAGNOSIS — M79.671 RIGHT FOOT PAIN: Primary | ICD-10-CM

## 2021-01-04 NOTE — TELEPHONE ENCOUNTER
Ok to provide referral. May also see Dr Elizabeth Traore. Due to acute severe pain eval today at local urgent care or ER is advised.

## 2021-01-04 NOTE — TELEPHONE ENCOUNTER
Pt states he is feeling better, unsure about his heart rate. Pt will call back to schedule HFU.  See other telephone message regarding referral. Pt would like to get this looked at first then call back to schedule

## 2021-01-04 NOTE — TELEPHONE ENCOUNTER
Spoke with pt. Pt states he was referred by Dr. Aftab Henry a few years ago to [address].  Pt is unsure who the doctor was, but would like a referral placed for R foot/big toe pain

## 2021-01-04 NOTE — TELEPHONE ENCOUNTER
Ok to schedule hosp f/u appt via VV. Verify rapid heart beat & bp  Concern was addressed during hosp visit. If this is new then needs eval at local ER today.

## 2021-01-04 NOTE — TELEPHONE ENCOUNTER
Referral should be sent to [facility]. Patient states he is in excruciating pain and needs referral soon as possible. Please advise.

## 2021-01-05 ENCOUNTER — APPOINTMENT (OUTPATIENT)
Dept: GENERAL RADIOLOGY | Age: 56
End: 2021-01-05
Payer: MEDICAID

## 2021-01-05 ENCOUNTER — HOSPITAL ENCOUNTER (EMERGENCY)
Age: 56
Discharge: HOME | End: 2021-01-05
Attending: EMERGENCY MEDICINE
Payer: MEDICAID

## 2021-01-05 VITALS
WEIGHT: 300 LBS | OXYGEN SATURATION: 98 % | TEMPERATURE: 99.3 F | RESPIRATION RATE: 20 BRPM | HEART RATE: 110 BPM | SYSTOLIC BLOOD PRESSURE: 141 MMHG | BODY MASS INDEX: 47.09 KG/M2 | HEIGHT: 67 IN | DIASTOLIC BLOOD PRESSURE: 96 MMHG

## 2021-01-05 DIAGNOSIS — S90.111A CONTUSION OF RIGHT GREAT TOE WITHOUT DAMAGE TO NAIL, INITIAL ENCOUNTER: ICD-10-CM

## 2021-01-05 DIAGNOSIS — M10.00 ACUTE IDIOPATHIC GOUT, UNSPECIFIED SITE: Primary | ICD-10-CM

## 2021-01-05 PROCEDURE — 96374 THER/PROPH/DIAG INJ IV PUSH: CPT

## 2021-01-05 PROCEDURE — 6360000002 HC RX W HCPCS: Performed by: EMERGENCY MEDICINE

## 2021-01-05 PROCEDURE — 99283 EMERGENCY DEPT VISIT LOW MDM: CPT

## 2021-01-05 PROCEDURE — 73630 X-RAY EXAM OF FOOT: CPT

## 2021-01-05 PROCEDURE — 96372 THER/PROPH/DIAG INJ SC/IM: CPT

## 2021-01-05 RX ORDER — DEXAMETHASONE SODIUM PHOSPHATE 4 MG/ML
10 INJECTION, SOLUTION INTRA-ARTICULAR; INTRALESIONAL; INTRAMUSCULAR; INTRAVENOUS; SOFT TISSUE ONCE
Status: COMPLETED | OUTPATIENT
Start: 2021-01-05 | End: 2021-01-05

## 2021-01-05 RX ORDER — KETOROLAC TROMETHAMINE 30 MG/ML
30 INJECTION, SOLUTION INTRAMUSCULAR; INTRAVENOUS ONCE
Status: COMPLETED | OUTPATIENT
Start: 2021-01-05 | End: 2021-01-05

## 2021-01-05 RX ORDER — METHYLPREDNISOLONE 4 MG/1
TABLET ORAL
Qty: 1 KIT | Refills: 0 | Status: SHIPPED | OUTPATIENT
Start: 2021-01-05 | End: 2021-01-11

## 2021-01-05 RX ADMIN — DEXAMETHASONE SODIUM PHOSPHATE 10 MG: 4 INJECTION, SOLUTION INTRAMUSCULAR; INTRAVENOUS at 17:00

## 2021-01-05 RX ADMIN — KETOROLAC TROMETHAMINE 30 MG: 30 INJECTION, SOLUTION INTRAMUSCULAR; INTRAVENOUS at 16:20

## 2021-01-05 ASSESSMENT — PAIN DESCRIPTION - DESCRIPTORS: DESCRIPTORS: SHARP

## 2021-01-05 ASSESSMENT — PAIN DESCRIPTION - FREQUENCY: FREQUENCY: CONTINUOUS

## 2021-01-05 NOTE — ED PROVIDER NOTES
[address] [facility]          ATTENDING PHYSICIAN NOTE       Date of evaluation: 1/5/2021    Chief Complaint     Foot Pain      History of Present Illness     Halina Hatchet is a 63 y.o. male who presents with primary complaint of pain in his right great toe for the last 3 days. He reports he stepped in a hole and stubbed the end of the toe on Saturday. Reports has had pain at that point in the great toe itself radiating back up in the dorsal aspect of the foot towards the midfoot. Reports he is also had the feeling that his gout has come on in the left side, which overall makes it difficult to walk with both feet being injured. Denies any open wounds to the foot. Denies any knee pain. Review of Systems     Review of Systems  Pertinent positives and negatives are listed in the HPI; otherwise all systems are reviewed and were negative. Past Medical, Surgical, Family, and Social History     He has a past medical history of Arthritis, Chicken pox, Chronic GERD, COVID-19 virus infection, Diabetes mellitus ([address] Dizziness, CORETS (generalized anxiety disorder), Gout, Hyperlipidemia, Obstructive sleep apnea syndrome, Panic attacks, Pneumonia due to COVID-19 virus, Rash, and Vitamin D deficiency. He has a past surgical history that includes knee surgery; Hand surgery; [place] tooth extraction; Tonsillectomy; Colonoscopy (2017); meniscectomy (Left); Knee arthroscopy (Right, 09/14/2017); and Upper gastrointestinal endoscopy (N/A, 9/10/2018). His family history includes Elevated Lipids in his mother; High Cholesterol in his mother; No Known Problems in his brother, brother, father, maternal grandfather, maternal grandmother, paternal grandfather, paternal grandmother, and sister. He reports that he has never smoked. He has never used smokeless tobacco. He reports that he does not drink alcohol or use drugs.     Medications     Discharge Medication List as of 1/5/2021  5:09 PM CONTINUE these medications which have NOT CHANGED    Details   Vitamin D (CHOLECALCIFEROL) 50 MCG (2000 UT) TABS tablet Take 1 tablet by mouth daily Therapeutic Dose., Disp-60 tablet,R-0Labeling may look different. 25 mcg=1000 Units. Please double check dosages. Print             Allergies     He has No Known Allergies. Physical Exam     INITIAL VITALS: BP: (!) 141/96, Temp: 99.3 °F (37.4 °C), Pulse: 110, Resp: 20, SpO2: 98 %   Physical Exam  Constitutional:       Appearance: Normal appearance. HENT:      Head: Normocephalic and atraumatic. Cardiovascular:      Rate and Rhythm: Normal rate. Pulmonary:      Effort: Pulmonary effort is normal. No respiratory distress. Musculoskeletal:      Comments: Right great toe moderately edematous without some erythema and ecchymosis, with edema/ erythema extending into the distal foot. Tender palpation of the first digit without tenderness of the remaining digits. Full range of motion of the ankle. No knee or ankle tenderness. Left ankle/ foot grossly normal in appearance without deformity or erythema   Skin:     General: Skin is warm and dry. Neurological:      General: No focal deficit present. Mental Status: He is alert and oriented to person, place, and time. Diagnostic Results     EKG       RADIOLOGY:  XR FOOT RIGHT (MIN 3 VIEWS)   Final Result   1. First MTP osteophyte arthritis without acute osseous abnormality identified. 2. Ossification along the Achilles tendon suggesting chronic Achilles tendinopathy. LABS:   No results found for this visit on 01/05/21. ED BEDSIDE ULTRASOUND:      RECENT VITALS:  BP: (!) 141/96,Temp: 99.3 °F (37.4 °C), Pulse: 110, Resp: 20, SpO2: 98 %     Procedures         ED Course     Nursing Notes, Past Medical Hx, Past Surgical Hx, Social Hx,Allergies, and Family Hx were reviewed.     patient was given the following medications:  Orders Placed This Encounter   Medications    ketorolac (TORADOL) injection 30 mg    dexamethasone (DECADRON) injection 10 mg    methylPREDNISolone (MEDROL, MARLA,) 4 MG tablet     Sig: Take by mouth. Dispense:  1 kit     Refill:  0       CONSULTS:  None    MEDICAL DECISIONMAKING / ASSESSMENT / PLAN     Barbara Oliveros is a 63 y.o. male who presents with primary complaint of pain in the great toe on his right foot after stepping on a wastebasket several days ago, with his condition exacerbated by the development of a gout attack in his left foot. Radiographs were obtained of the right foot which demonstrate no obvious fracture. He does have some soft tissue swelling there and I suspect soft tissue injury rather than occult fracture. Given the obvious traumatic mechanism without open wound I suspect that the swelling erythema is likely result of soft tissue injury rather than infection. He was given Toradol here in the emergency department. He is requesting Decadron and a Medrol Dosepak which he says is the standard treatment for him for his gout and he has had multiple times and works very effectively for him. We will go and provide this now. He was cautioned that it may elevate his blood sugar. He was given a hard soled shoe and he appears to have a podiatry follow-up referral provided through his PCP on review of his chart. .        Clinical Impression     1. Acute idiopathic gout, unspecified site    2.  Contusion of right great toe without damage to nail, initial encounter        Disposition     PATIENT REFERRED TO:  Shaggy Boyer MD  . [address]  [phone]            DISCHARGE MEDICATIONS:  Discharge Medication List as of 1/5/2021  5:09 PM      START taking these medications    Details   methylPREDNISolone (MEDROL, MARLA,) 4 MG tablet Take by mouth., Disp-1 kit, R-0Print             DISPOSITION Decision To Discharge 01/05/2021 05:10:34 PM          Reyes Huge, MD  01/05/21 [id number]

## 2021-01-05 NOTE — ED TRIAGE NOTES
Step right in to a waste basket walking in to BR in dark on Saturday injuring left side of right foot.

## 2021-02-11 ENCOUNTER — HOSPITAL ENCOUNTER (EMERGENCY)
Age: 56
Discharge: HOME | End: 2021-02-11
Attending: EMERGENCY MEDICINE
Payer: MEDICAID

## 2021-02-11 ENCOUNTER — APPOINTMENT (OUTPATIENT)
Dept: GENERAL RADIOLOGY | Age: 56
End: 2021-02-11
Payer: MEDICAID

## 2021-02-11 VITALS
TEMPERATURE: 99.1 F | RESPIRATION RATE: 18 BRPM | OXYGEN SATURATION: 96 % | DIASTOLIC BLOOD PRESSURE: 94 MMHG | SYSTOLIC BLOOD PRESSURE: 139 MMHG | HEART RATE: 102 BPM

## 2021-02-11 DIAGNOSIS — M25.572 ACUTE LEFT ANKLE PAIN: Primary | ICD-10-CM

## 2021-02-11 LAB
GLUCOSE BLD-MCNC: 100 MG/DL (ref 70–99)
PERFORMED ON: ABNORMAL

## 2021-02-11 PROCEDURE — 99283 EMERGENCY DEPT VISIT LOW MDM: CPT

## 2021-02-11 PROCEDURE — 73610 X-RAY EXAM OF ANKLE: CPT

## 2021-02-11 PROCEDURE — 6360000002 HC RX W HCPCS: Performed by: EMERGENCY MEDICINE

## 2021-02-11 PROCEDURE — 73630 X-RAY EXAM OF FOOT: CPT

## 2021-02-11 PROCEDURE — 96372 THER/PROPH/DIAG INJ SC/IM: CPT

## 2021-02-11 RX ORDER — DEXAMETHASONE SODIUM PHOSPHATE 4 MG/ML
10 INJECTION, SOLUTION INTRA-ARTICULAR; INTRALESIONAL; INTRAMUSCULAR; INTRAVENOUS; SOFT TISSUE ONCE
Status: COMPLETED | OUTPATIENT
Start: 2021-02-11 | End: 2021-02-11

## 2021-02-11 RX ORDER — METHYLPREDNISOLONE 4 MG/1
TABLET ORAL
Qty: 1 KIT | Refills: 0 | Status: SHIPPED | OUTPATIENT
Start: 2021-02-11 | End: 2021-02-17

## 2021-02-11 RX ORDER — KETOROLAC TROMETHAMINE 30 MG/ML
15 INJECTION, SOLUTION INTRAMUSCULAR; INTRAVENOUS ONCE
Status: COMPLETED | OUTPATIENT
Start: 2021-02-11 | End: 2021-02-11

## 2021-02-11 RX ADMIN — DEXAMETHASONE SODIUM PHOSPHATE 10 MG: 4 INJECTION, SOLUTION INTRAMUSCULAR; INTRAVENOUS at 08:20

## 2021-02-11 RX ADMIN — KETOROLAC TROMETHAMINE 15 MG: 30 INJECTION, SOLUTION INTRAMUSCULAR at 08:20

## 2021-02-11 ASSESSMENT — PAIN DESCRIPTION - LOCATION: LOCATION: FOOT

## 2021-02-11 ASSESSMENT — PAIN SCALES - GENERAL: PAINLEVEL_OUTOF10: 6

## 2021-02-11 ASSESSMENT — PAIN DESCRIPTION - PAIN TYPE: TYPE: ACUTE PAIN

## 2021-02-11 NOTE — ED PROVIDER NOTES
[facility] ATTENDING NOTE       Date of evaluation: 2/11/2021    Chief Complaint     Foot Pain (STATES GOUT IS FLARING UP)      of Present Illness     Yancy Teresa is a 63 y.o. male who presents to the emergency department for left ankle pain. Patient states that for the last 3 days he has been having worsening pain in his left ankle. The pain is currently 8/10 severity and worse with movement or walking. Patient states that the pain is similar to previous episodes of gout. He denies any systemic features such as fever, nausea or vomiting. He has noticed that his ankle has been more swollen which is not typical of his gout flares but does happen occasionally. He denies any falls or trauma. He denies any recent changes in medication. The patient denies any additional complaints. Review of Systems     Review of Systems  Positive for left ankle pain. Negative for recent fever, cough, shortness of breath, chest pain, abdominal pain, nausea, vomiting, headache, changes in vision, neck pain, back pain, loss of taste, loss of smell, changes in bladder or bowel function    See HPI. A complete review of systems was conducted and is otherwise negative unless noted above. Past Medical, Surgical, Family, and Social History     He has a past medical history of Arthritis, Chicken pox, Chronic GERD, COVID-19 virus infection, Diabetes mellitus ([address] Dizziness, CORTES (generalized anxiety disorder), Gout, Hyperlipidemia, Obstructive sleep apnea syndrome, Panic attacks, Pneumonia due to COVID-19 virus, Rash, and Vitamin D deficiency. He has a past surgical history that includes knee surgery; Hand surgery; [place] tooth extraction; Tonsillectomy; Colonoscopy (2017); meniscectomy (Left); Knee arthroscopy (Right, 09/14/2017); and Upper gastrointestinal endoscopy (N/A, 9/10/2018).   His family history includes Elevated Lipids in his mother; High Cholesterol in his mother; No Known Problems in his brother, brother, father, maternal grandfather, maternal grandmother, paternal grandfather, paternal grandmother, and sister. He reports that he has never smoked. He has never used smokeless tobacco. He reports that he does not drink alcohol or use drugs. Medications     Discharge Medication List as of 2/11/2021 10:08 AM      CONTINUE these medications which have NOT CHANGED    Details   Vitamin D (CHOLECALCIFEROL) 50 MCG (2000 UT) TABS tablet Take 1 tablet by mouth daily Therapeutic Dose., Disp-60 tablet,R-0Labeling may look different. 25 mcg=1000 Units. Please double check dosages. Print             Allergies     He has No Known Allergies. Physical Exam     INITIAL VITALS: BP: (!) 161/90, Temp: 99.1 °F (37.3 °C), Pulse: 106, Resp: 18, SpO2: 96 %   Physical Exam    General:  Well developed adult male in no acute distress, able toconverse in full sentences  HEENT:  Normocephalic, atraumatic, PERRL, extra-ocular movements intact, oropharynx benign  Neck:  Supple, no lymphadenopathy, trachea midline, no masses  Pulmonary:   Clear to auscultation bilaterally, good air movement, no wheezes  Cardiac:  Regular rate and rhythm, no M/R/G  Abdomen:  Soft,non-tender, non-distended, no rebounding or guarding, normoactive borborygmus  Musculoskeletal:  2+ pulses, mild medial and lateral edema of the left ankle. Neurovascularly intact distally. No overlying erythema or warmth  Skin:  No concerning rashes or lesions, no cyanosis  Neuro: Alert and oriented X 4,able to move all four extremities with equal strength bilaterally, sensory exam grossly intact, cranial nerves II-XII intact  Psych:  Appropriate mood and affect    Diagnostic Results         RADIOLOGY:  XR ANKLE LEFT (MIN 3 VIEWS)   Final Result      Left ankle: 3 views demonstrate bimalleolar soft tissue swelling. Ankle mortise is intact. No osseous abnormalities seen.       Left foot: 3 views demonstrate moderate osteoarthritic examination. His vitals are reassuring as well. We will treat empirically as potential gout flare. I discussed the need for tighter glycemic monitoring while on steroids with the patient voiced understanding. He will follow up with his primary care for further management and discussion of chronic suppressive medication for gout. Patient was treated with below medications:  Medications   dexamethasone (DECADRON) injection 10 mg (10 mg Intramuscular Given 2/11/21 0820)   ketorolac (TORADOL) injection 15 mg (15 mg Intramuscular Given 2/11/21 0820)       At this time the patient has been deemed safe for discharge. Verbal discharge instructionsincluding strict return precautions for worsening or new symptoms have been communicated. The patient was advised to follow up with PCP. Clinical Impression     1.  Acute left ankle pain        Disposition     PATIENT REFERREDTO:  Miguel Ángel Lomeli MD  [address] [place] [address] [address]    Schedule an appointment as soon as possible for a visit         DISCHARGE MEDICATIONS:  Discharge Medication List as of 2/11/2021 10:08 AM      START taking these medications    Details   methylPREDNISolone (MEDROL, MARLA,) 4 MG tablet Take by mouth., Disp-1 kit, R-0Print             DISPOSITION            Alisson Thorpe MD  02/11/21 5277

## 2021-02-11 NOTE — ED NOTES
Pt discharged in stable condition;  Rx for prednisone given to pt     Óscar Rodriguez RN  02/11/21 9891

## 2021-02-18 ENCOUNTER — TELEPHONE (OUTPATIENT)
Dept: FAMILY MEDICINE CLINIC | Age: 56
End: 2021-02-18

## 2021-02-23 ENCOUNTER — VIRTUAL VISIT (OUTPATIENT)
Dept: FAMILY MEDICINE CLINIC | Age: 56
End: 2021-02-23
Payer: MEDICAID

## 2021-02-23 DIAGNOSIS — M1A.09X0 IDIOPATHIC CHRONIC GOUT OF MULTIPLE SITES WITHOUT TOPHUS: ICD-10-CM

## 2021-02-23 DIAGNOSIS — E66.01 MORBID OBESITY (HCC): ICD-10-CM

## 2021-02-23 DIAGNOSIS — R03.0 ELEVATED BLOOD-PRESSURE READING WITHOUT DIAGNOSIS OF HYPERTENSION: ICD-10-CM

## 2021-02-23 DIAGNOSIS — E11.9 CONTROLLED TYPE 2 DIABETES MELLITUS WITHOUT COMPLICATION, WITHOUT LONG-TERM CURRENT USE OF INSULIN (HCC): Primary | ICD-10-CM

## 2021-02-23 DIAGNOSIS — E78.2 MIXED HYPERLIPIDEMIA: ICD-10-CM

## 2021-02-23 DIAGNOSIS — E55.9 VITAMIN D DEFICIENCY: ICD-10-CM

## 2021-02-23 PROCEDURE — 99214 OFFICE O/P EST MOD 30 MIN: CPT | Performed by: FAMILY MEDICINE

## 2021-02-23 PROCEDURE — 3046F HEMOGLOBIN A1C LEVEL >9.0%: CPT | Performed by: FAMILY MEDICINE

## 2021-02-23 PROCEDURE — G8427 DOCREV CUR MEDS BY ELIG CLIN: HCPCS | Performed by: FAMILY MEDICINE

## 2021-02-23 PROCEDURE — 2022F DILAT RTA XM EVC RTNOPTHY: CPT | Performed by: FAMILY MEDICINE

## 2021-02-23 PROCEDURE — 3017F COLORECTAL CA SCREEN DOC REV: CPT | Performed by: FAMILY MEDICINE

## 2021-02-23 RX ORDER — BLOOD PRESSURE TEST KIT
KIT MISCELLANEOUS
Qty: 1 KIT | Refills: 0 | Status: SHIPPED | OUTPATIENT
Start: 2021-02-23

## 2021-02-23 RX ORDER — ALLOPURINOL 300 MG/1
300 TABLET ORAL DAILY
Qty: 90 TABLET | Refills: 0 | Status: SHIPPED | OUTPATIENT
Start: 2021-02-23 | End: 2021-07-06

## 2021-02-23 ASSESSMENT — ENCOUNTER SYMPTOMS
ABDOMINAL DISTENTION: 0
CHOKING: 0
DIARRHEA: 0
BLOOD IN STOOL: 0
CHEST TIGHTNESS: 0
VOMITING: 0
RECTAL PAIN: 0
COUGH: 0
ANAL BLEEDING: 0
STRIDOR: 0
ABDOMINAL PAIN: 0
APNEA: 0
CONSTIPATION: 0
NAUSEA: 0
SHORTNESS OF BREATH: 0
WHEEZING: 0

## 2021-02-23 ASSESSMENT — PATIENT HEALTH QUESTIONNAIRE - PHQ9
1. LITTLE INTEREST OR PLEASURE IN DOING THINGS: 0
SUM OF ALL RESPONSES TO PHQ QUESTIONS 1-9: 0
SUM OF ALL RESPONSES TO PHQ9 QUESTIONS 1 & 2: 0
2. FEELING DOWN, DEPRESSED OR HOPELESS: 0

## 2021-02-23 NOTE — PROGRESS NOTES
Subjective:      Patient ID: Judit Power is a 63 y.o. male. HPI    Patient is  being evaluated by a Virtual Visit (video visit) encounter to address concerns as mentioned above. A caregiver was present when appropriate. Due to this being a TeleHealth encounter (During [facility]-28 public health emergency), evaluation of the following organ systems was limited: Vitals/Constitutional/EENT/Resp/CV/GI//MS/Neuro/Skin/Heme-Lymph-Imm. Pursuant to the emergency declaration under the [address] [place] [address], [address] [place] [address] authority and the [employer] Act, this Virtual Visit was conducted with patient's (and/or legal guardian's) consent, to reduce the patient's risk of exposure to COVID-19 and provide necessary medical care. The patient (and/or legal guardian) has also been advised to contact this office for worsening conditions or problems, and seek emergency medical treatment and/or call 911 if deemed necessary. Services were provided through a video synchronous discussion virtually to substitute for in-person clinic visit. Patient and provider were located at their individual homes. \"THIS VISIT WAS COMPLETED VIRTUALLY USING DOXY. ME\"    [employer] well. Has had frequent flares ups. 2/11/21 ER visit due to flare up. Request allopurinol:has taken in the past.  Denies limb skin lesions/foot mnegwqentaw-cnwbdwfx-jrnesyjsc/pus. No discharge from an open wound on foot. No ulcers on foot/acute gait abnormality. Guero Rivera well:Preprandial-fasting BS/2hr postprandial BS:has not checked recently. HBA1c: 6.6 on 8/8/18. was  6.1 on 7/13/19. 6.0 on 7/3/20. LDL <100:92 on 8/7/18. 133 on 7/13/19. 161 on 7/2/20. No new associated or worsening factors. Denies polyuria/polyphagia/polydipsia/BLE skin lesions/BLE paresthesia. Vit D def:doing well. Lab due. Associated with nothing else. Denies bone pain/spontaneous fx. Hyperlipidemia:doing well. Labs due. No other new associated or worsening factors. Improving factors:diet management. Denies adbo pain/myalgias. No Known Allergies    Current Outpatient Medications on File Prior to Visit   Medication Sig Dispense Refill    Vitamin D (CHOLECALCIFEROL) 50 MCG (2000 UT) TABS tablet Take 1 tablet by mouth daily Therapeutic Dose. 60 tablet 0     No current facility-administered medications on file prior to visit. Past Medical History:   Diagnosis Date    Arthritis     Chicken pox     Chronic GERD 7/19/2018    COVID-19 virus infection 11/22/2020    Diabetes mellitus ([facility] [address]     per H&P    Dizziness     CORTES (generalized anxiety disorder)     Gout     Hyperlipidemia     Obstructive sleep apnea syndrome 08/08/2016    doesn't use CPAP    Panic attacks     Pneumonia due to COVID-19 virus 12/4/2020    Rash     Vitamin D deficiency 8/3/2015           Social History     Tobacco Use    Smoking status: Never Smoker    Smokeless tobacco: Never Used   Substance Use Topics    Alcohol use: No     Alcohol/week: 0.0 standard drinks     Comment: rarely    Drug use: No     Social History     Substance and Sexual Activity   Drug Use No             Review of Systems   Constitutional: Negative for activity change, appetite change, chills, diaphoresis, fatigue, fever and unexpected weight change. Eyes: Negative for visual disturbance. Respiratory: Negative for apnea, cough, choking, chest tightness, shortness of breath, wheezing and stridor. Cardiovascular: Negative for chest pain, palpitations and leg swelling. Gastrointestinal: Negative for abdominal distention, abdominal pain, anal bleeding, blood in stool, constipation, diarrhea, nausea, rectal pain and vomiting. Endocrine: Negative for cold intolerance, heat intolerance, polydipsia, polyphagia and polyuria. Genitourinary: Negative for difficulty urinating. Skin: Negative for pallor, rash and wound. Neurological: Negative for dizziness. Psychiatric/Behavioral: Negative for sleep disturbance. Objective:RR=16   Physical Exam  Constitutional:       Appearance: He is well-developed. He is not toxic-appearing. Comments: Note:exam was conducted with pt' either self-palpating or visually indicating via their device camera. HENT:      Mouth/Throat:      Mouth: Mucous membranes are moist.      Pharynx: Oropharynx is clear. Uvula midline. Eyes:      General: No scleral icterus. Conjunctiva/sclera: Conjunctivae normal.   Neck:      Comments: No neck LAD per self-palpation. Cardiovascular:      Rate and Rhythm: Normal rate and regular rhythm. Heart sounds: Normal heart sounds. Pulmonary:      Effort: Pulmonary effort is normal.      Breath sounds: Normal breath sounds. Abdominal:      Comments: Abdo exam:S,Non-tender per pt' self-palpation. Neurological:      Mental Status: He is alert. Psychiatric:         Mood and Affect: Mood normal.         Behavior: Behavior is cooperative. Comments: Good eye contact. Assessment:       Diagnosis Orders   1. Controlled type 2 diabetes mellitus without complication, without long-term current use of insulin ([address]  Well appearing. Stable. Labs due. Hemoglobin A1C    Microalbumin / Creatinine Urine Ratio    Comprehensive Metabolic Panel    Blood Pressure KIT   2. Idiopathic chronic gout of multiple sites without tophus  Frequent flares ups. No acute episode currently. Star maintenance med. Possible med side effects reviewed. Pt' wishes to proceed w/med. Lab test in 1mo. URIC ACID    allopurinol (ZYLOPRIM) 300 MG tablet   3. Mixed hyperlipidemia  Labs due. Lipid Panel    Comprehensive Metabolic Panel   4. Vitamin D deficiency  Lab bertram. Vitamin D 25 Hydroxy   5. Morbid obesity ([address]  Diet & exercise regime reviewed.

## 2021-04-22 ENCOUNTER — CARE COORDINATION (OUTPATIENT)
Dept: CARE COORDINATION | Age: 56
End: 2021-04-22

## 2021-06-15 LAB
AVERAGE GLUCOSE: NORMAL
HBA1C MFR BLD: 5.7 %

## 2021-07-04 DIAGNOSIS — M1A.09X0 IDIOPATHIC CHRONIC GOUT OF MULTIPLE SITES WITHOUT TOPHUS: ICD-10-CM

## 2021-07-06 RX ORDER — ALLOPURINOL 300 MG/1
300 TABLET ORAL DAILY
Qty: 90 TABLET | Refills: 0 | Status: SHIPPED | OUTPATIENT
Start: 2021-07-06 | End: 2022-07-06

## 2021-07-06 NOTE — TELEPHONE ENCOUNTER
Medication:   Requested Prescriptions     Pending Prescriptions Disp Refills    allopurinol (ZYLOPRIM) 300 MG tablet [Pharmacy Med Name: ALLOPURINOL 300MG TABLETS] 90 tablet 0     Sig: TAKE 1 TABLET BY MOUTH DAILY            Patient Phone Number: [phone] (home)     Last appt: 2/23/2021

## 2021-07-20 ENCOUNTER — HOSPITAL ENCOUNTER (EMERGENCY)
Age: 56
Discharge: HOME | End: 2021-07-20
Attending: EMERGENCY MEDICINE
Payer: MEDICAID

## 2021-07-20 VITALS
HEART RATE: 105 BPM | HEIGHT: 68 IN | WEIGHT: 300 LBS | SYSTOLIC BLOOD PRESSURE: 138 MMHG | TEMPERATURE: 98.4 F | RESPIRATION RATE: 18 BRPM | OXYGEN SATURATION: 96 % | DIASTOLIC BLOOD PRESSURE: 100 MMHG | BODY MASS INDEX: 45.47 KG/M2

## 2021-07-20 DIAGNOSIS — M10.9 ACUTE GOUT OF RIGHT WRIST, UNSPECIFIED CAUSE: Primary | ICD-10-CM

## 2021-07-20 PROCEDURE — 96372 THER/PROPH/DIAG INJ SC/IM: CPT

## 2021-07-20 PROCEDURE — 6360000002 HC RX W HCPCS: Performed by: STUDENT IN AN ORGANIZED HEALTH CARE EDUCATION/TRAINING PROGRAM

## 2021-07-20 PROCEDURE — 99284 EMERGENCY DEPT VISIT MOD MDM: CPT

## 2021-07-20 RX ORDER — ATORVASTATIN CALCIUM 20 MG/1
20 TABLET, FILM COATED ORAL DAILY
COMMUNITY

## 2021-07-20 RX ORDER — METHYLPREDNISOLONE 4 MG/1
TABLET ORAL
Qty: 21 TABLET | Refills: 0 | Status: SHIPPED | OUTPATIENT
Start: 2021-07-20 | End: 2021-07-26

## 2021-07-20 RX ORDER — KETOROLAC TROMETHAMINE 30 MG/ML
15 INJECTION, SOLUTION INTRAMUSCULAR; INTRAVENOUS ONCE
Status: DISCONTINUED | OUTPATIENT
Start: 2021-07-20 | End: 2021-07-20

## 2021-07-20 RX ORDER — DEXAMETHASONE SODIUM PHOSPHATE 4 MG/ML
10 INJECTION, SOLUTION INTRA-ARTICULAR; INTRALESIONAL; INTRAMUSCULAR; INTRAVENOUS; SOFT TISSUE ONCE
Status: COMPLETED | OUTPATIENT
Start: 2021-07-20 | End: 2021-07-20

## 2021-07-20 RX ORDER — AMLODIPINE AND VALSARTAN 5; 160 MG/1; MG/1
1 TABLET ORAL DAILY
COMMUNITY

## 2021-07-20 RX ORDER — INDOMETHACIN 25 MG/1
50 CAPSULE ORAL 3 TIMES DAILY
Qty: 180 CAPSULE | Refills: 11 | Status: SHIPPED | OUTPATIENT
Start: 2021-07-20 | End: 2022-07-20

## 2021-07-20 RX ADMIN — DEXAMETHASONE SODIUM PHOSPHATE 10 MG: 4 INJECTION, SOLUTION INTRAMUSCULAR; INTRAVENOUS at 10:56

## 2021-07-20 ASSESSMENT — PAIN DESCRIPTION - DESCRIPTORS: DESCRIPTORS: THROBBING

## 2021-07-20 ASSESSMENT — PAIN DESCRIPTION - LOCATION: LOCATION: WRIST

## 2021-07-20 ASSESSMENT — ENCOUNTER SYMPTOMS
RESPIRATORY NEGATIVE: 1
GASTROINTESTINAL NEGATIVE: 1

## 2021-07-20 ASSESSMENT — PAIN DESCRIPTION - FREQUENCY: FREQUENCY: CONTINUOUS

## 2021-07-20 ASSESSMENT — PAIN DESCRIPTION - PAIN TYPE: TYPE: ACUTE PAIN

## 2021-07-20 ASSESSMENT — PAIN DESCRIPTION - ORIENTATION: ORIENTATION: RIGHT

## 2021-07-20 ASSESSMENT — PAIN SCALES - GENERAL: PAINLEVEL_OUTOF10: 10

## 2021-07-20 NOTE — ED NOTES
Pt discharged with Rx x 2 and written instructions. Pt verbalizes understanding and walked to lobby without difficulty.        Tanya Velasquez RN  07/20/21 9107

## 2021-07-20 NOTE — ED PROVIDER NOTES
ED Attending Attestation Note     Date of evaluation: 7/20/2021    This patient was seen by the resident. I have seen and examined the patient, agree with the workup, evaluation, management and diagnosis. The care plan has been discussed. My assessment reveals patient with right wrist pain c/w gout, triggered by chicken meal.  Unable to get in to see PCP. Steroids help him the most and will provide decadron here and dose pack to go.      Reagan Lewis MD  07/20/21 2349

## 2021-07-20 NOTE — ED PROVIDER NOTES
[address] [facility] RESIDENT NOTE       Date of evaluation: 7/20/2021    Chief Complaint     Gout (right wrist gout pain x3 days)      History of Present Illness     Alexis Berman is a 61 y.o. 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 Patient presents with approximately 3 days of progressive right wrist pain. Pain is constant in nature, described as a dull ache with intermittent increases in intensity of pain, precipitated by movement, touch. Pain is consistent with his previous episodes of gout flares. In addition to his right wrist pain, he also reports some mild pain localized to his left great toe. He otherwise denies any systemic symptoms to include fever, chills, lightheadedness, chest pain, difficulty breathing, abdominal pain, nausea, emesis. No previous episodes of joint infections. He is prescribed allopurinol, which he stopped taking at onset of his right wrist pain. Typically, he experiences resolution of his symptoms with NSAIDs as well as a steroid shot and Medrol Dosepak, which is why he elected to present today. He does take an ARB, but does not take any other diuretics, ACE inhibitor's. Review of Systems     Review of Systems   Constitutional: Negative for chills, fatigue and fever. HENT: Negative. Respiratory: Negative. Cardiovascular: Negative. Gastrointestinal: Negative. Musculoskeletal: Positive for arthralgias and joint swelling. Skin: Negative. Neurological: Negative. Psychiatric/Behavioral: Negative. All other systems reviewed and are negative.     Past Medical, Surgical, Family, and Social History     He has a past medical history of Arthritis, Chicken pox, Chronic GERD, COVID-19 virus infection, Diabetes mellitus ([address] Dizziness, CORTES (generalized anxiety disorder), Gout, Hyperlipidemia, Obstructive sleep apnea syndrome, Panic attacks, Pneumonia due to COVID-19 virus, Rash, and Vitamin D deficiency. He has a past surgical history that includes knee surgery; Hand surgery; [place] tooth extraction; Tonsillectomy; Colonoscopy (2017); meniscectomy (Left); Knee arthroscopy (Right, 09/14/2017); and Upper gastrointestinal endoscopy (N/A, 9/10/2018). His family history includes Elevated Lipids in his mother; High Cholesterol in his mother; No Known Problems in his brother, brother, father, maternal grandfather, maternal grandmother, paternal grandfather, paternal grandmother, and sister. He reports that he has never smoked. He has never used smokeless tobacco. He reports that he does not drink alcohol and does not use drugs. Medications     Previous Medications    ALLOPURINOL (ZYLOPRIM) 300 MG TABLET    TAKE 1 TABLET BY MOUTH DAILY    AMLODIPINE-VALSARTAN (EXFORGE) 5-160 MG PER TABLET    Take 1 tablet by mouth daily    ATORVASTATIN (LIPITOR) 20 MG TABLET    Take 20 mg by mouth daily    BLOOD PRESSURE KIT    Dispense one bp kit    VITAMIN D (CHOLECALCIFEROL) 50 MCG (2000 UT) TABS TABLET    Take 1 tablet by mouth daily Therapeutic Dose. Allergies     He has No Known Allergies. Physical Exam     INITIAL VITALS: BP: (!) 138/100, Temp: 98.4 °F (36.9 °C), Pulse: 105, Resp: 18, SpO2: 96 %   Physical Exam  Constitutional:       General: He is not in acute distress. Appearance: Normal appearance. He is obese. He is not ill-appearing. HENT:      Head: Normocephalic and atraumatic. Mouth/Throat:      Mouth: Mucous membranes are moist.   Eyes:      Extraocular Movements: Extraocular movements intact. Conjunctiva/sclera: Conjunctivae normal.      Pupils: Pupils are equal, round, and reactive to light. Cardiovascular:      Rate and Rhythm: Regular rhythm. Tachycardia present. Pulses: Normal pulses. Heart sounds: Normal heart sounds. No murmur heard. No friction rub. No gallop.     Pulmonary:      Effort: Pulmonary effort is normal. No respiratory distress. Breath sounds: Normal breath sounds. No stridor. No wheezing, rhonchi or rales. Chest:      Chest wall: No tenderness. Musculoskeletal:         General: Swelling and tenderness present. No deformity or signs of injury. Cervical back: Normal range of motion and neck supple. Skin:     General: Skin is warm and dry. Capillary Refill: Capillary refill takes less than 2 seconds. Findings: No rash. Neurological:      General: No focal deficit present. Mental Status: He is alert and oriented to person, place, and time. Mental status is at baseline. Psychiatric:         Mood and Affect: Mood normal.         Behavior: Behavior normal.         Thought Content: Thought content normal.         Judgment: Judgment normal.         DiagnosticResults     RADIOLOGY:  No orders to display       LABS:   No results found for this visit on 07/20/21. ED BEDSIDE ULTRASOUND:    RECENT VITALS:  BP: (!) 138/100, Temp: 98.4 °F (36.9 °C), Pulse: 105,Resp: 18, SpO2: 96 %     Procedures     ED Course     Nursing Notes, Past Medical Hx, Past Surgical Hx, Social Hx, Allergies, and Family Hx were reviewed. The patient was given the followingmedications:  Orders Placed This Encounter   Medications    dexamethasone (DECADRON) injection 10 mg    DISCONTD: ketorolac (TORADOL) injection 15 mg    methylPREDNISolone (MEDROL DOSEPACK) 4 MG tablet     Sig: Take by mouth. Dispense:  21 tablet     Refill:  0    indomethacin (INDOCIN) 25 MG capsule     Sig: Take 2 capsules by mouth 3 times daily     Dispense:  180 capsule     Refill:  11       CONSULTS:  None    MEDICAL DECISION MAKING / ASSESSMENT / PLAN     Jaquan Naavs is a 61 y.o. 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 Patient was slightly hypertensive to 138/100 and mildly tachycardic to 105, but otherwise afebrile, hemodynamically stable on presentation.   Patient presents with approximately 3 days of right wrist pain and mild left great toe pain consistent with known gout flares. On exam, patient with tenderness palpation and pain limited range of motion of right wrist with no overlying skin changes. Low concern for septic joint infection in the absence of physical exam findings or systemic symptoms to suggest this and given known history of gout. Patient administered Decadron 10 mg IM in emergency department will be discharged with prescription for Medrol Dosepak as well as for indomethacin. Patient counseled that steroids will elevate his blood sugar. He will follow-up with his primary care provider. This patient was also evaluated by the attending physician. All care plans werediscussed and agreed upon. Clinical Impression     1. Acute gout of right wrist, unspecified cause        Disposition     PATIENT REFERRED TO:  Faith Barr, APRN - CNP  [address] [place] [address] Emergency Dept  [address]  [phone]    Schedule an appointment as soon as possible for a visit   For wound re-check      DISCHARGE MEDICATIONS:  New Prescriptions    INDOMETHACIN (INDOCIN) 25 MG CAPSULE    Take 2 capsules by mouth 3 times daily    METHYLPREDNISOLONE (MEDROL DOSEPACK) 4 MG TABLET    Take by mouth.        Zuleyka Hollins MD  Resident  07/20/21 7411

## 2021-07-20 NOTE — ED TRIAGE NOTES
Right wrist pain x3 days 10/10. Was put on colchicine for 3 doses without improvement. Denies fevers, chills, chest pain, sob. A&Ox4.

## 2021-12-15 ENCOUNTER — CLINICAL DOCUMENTATION (OUTPATIENT)
Dept: OTHER | Age: 56
End: 2021-12-15

## 2022-05-03 ENCOUNTER — OFFICE VISIT (OUTPATIENT)
Dept: ENT CLINIC | Age: 57
End: 2022-05-03
Payer: MEDICAID

## 2022-05-03 VITALS
BODY MASS INDEX: 46.38 KG/M2 | SYSTOLIC BLOOD PRESSURE: 133 MMHG | WEIGHT: 306 LBS | DIASTOLIC BLOOD PRESSURE: 88 MMHG | HEIGHT: 68 IN | TEMPERATURE: 97.3 F | HEART RATE: 83 BPM

## 2022-05-03 DIAGNOSIS — H60.02 ABSCESS OF LEFT EAR CANAL: ICD-10-CM

## 2022-05-03 DIAGNOSIS — H61.23 BILATERAL IMPACTED CERUMEN: Primary | ICD-10-CM

## 2022-05-03 PROCEDURE — 1036F TOBACCO NON-USER: CPT | Performed by: OTOLARYNGOLOGY

## 2022-05-03 PROCEDURE — G8417 CALC BMI ABV UP PARAM F/U: HCPCS | Performed by: OTOLARYNGOLOGY

## 2022-05-03 PROCEDURE — 69210 REMOVE IMPACTED EAR WAX UNI: CPT | Performed by: OTOLARYNGOLOGY

## 2022-05-03 PROCEDURE — 4130F TOPICAL PREP RX AOE: CPT | Performed by: OTOLARYNGOLOGY

## 2022-05-03 PROCEDURE — 99214 OFFICE O/P EST MOD 30 MIN: CPT | Performed by: OTOLARYNGOLOGY

## 2022-05-03 PROCEDURE — G8427 DOCREV CUR MEDS BY ELIG CLIN: HCPCS | Performed by: OTOLARYNGOLOGY

## 2022-05-03 PROCEDURE — 3017F COLORECTAL CA SCREEN DOC REV: CPT | Performed by: OTOLARYNGOLOGY

## 2022-05-03 RX ORDER — AMOXICILLIN AND CLAVULANATE POTASSIUM 875; 125 MG/1; MG/1
1 TABLET, FILM COATED ORAL 2 TIMES DAILY
Qty: 20 TABLET | Refills: 0 | Status: SHIPPED | OUTPATIENT
Start: 2022-05-03 | End: 2022-05-13

## 2022-05-03 ASSESSMENT — ENCOUNTER SYMPTOMS
EYE ITCHING: 0
VOICE CHANGE: 0
EYE REDNESS: 0
CHOKING: 0
DIARRHEA: 0
SHORTNESS OF BREATH: 0
COUGH: 0
COLOR CHANGE: 0
SINUS PAIN: 0
EYE PAIN: 0
STRIDOR: 0
PHOTOPHOBIA: 0
RHINORRHEA: 0
NAUSEA: 0
SINUS PRESSURE: 0
TROUBLE SWALLOWING: 0
FACIAL SWELLING: 0
SORE THROAT: 0

## 2022-05-03 NOTE — PROGRESS NOTES
[facility]  [address], [address] [place] [address], [address] [place] [address] Ave  P: [phone]  F: [phone]       Patient     Katheran Saint  1965    ChiefComplaint     Chief Complaint   Patient presents with    Cerumen Impaction     Here to get ears checked, impacted cerumen and poss infection. States anytime he goes swimming the water will not come out of his ear, states has a big bump on LT ear that is painful. History of Present Illness     Katheran Saint is a pleasant 61 y.o. male here for muffled hearing, ear pain, suspected cerumen impaction. The patient has been swimming recently to try and lose weight. The water gets stuck in his ears. He is trying to use drops to clear out the wax and water, but he has developed some left-sided tragal swelling and pain and drainage. He had this on 2 separate occasions over the past few months. Typically goes away after a week. Denies any spinning sensation or tinnitus. He has not been on any recent antibiotics. Past Medical History     Past Medical History:   Diagnosis Date    Arthritis     Chicken pox     Chronic GERD 7/19/2018    COVID-19 virus infection 11/22/2020    Diabetes mellitus ([address]     per H&P    Dizziness     CORTES (generalized anxiety disorder)     Gout     Hyperlipidemia     Obstructive sleep apnea syndrome 08/08/2016    doesn't use CPAP    Panic attacks     Pneumonia due to COVID-19 virus 12/4/2020    Rash     Vitamin D deficiency 8/3/2015       Past Surgical History     Past Surgical History:   Procedure Laterality Date    COLONOSCOPY  2017    Per pt' was nml.     HAND SURGERY      KNEE ARTHROSCOPY Right 09/14/2017    RIGHT KNEE ARTHROSCOPY WITH PARTIAL MEDIAL AND LATERAL MENISCECTOMY,CHONDROPLASTY,SYNOVECTOMY    KNEE SURGERY      left meniscus    MENISCECTOMY Left     TONSILLECTOMY      UPPER GASTROINTESTINAL ENDOSCOPY N/A 9/10/2018    EGD BIOPSY performed by Gil Millan DO at [place] [id number] TOOTH EXTRACTION         Family History     Family History   Problem Relation Age of Onset    Elevated Lipids Mother     High Cholesterol Mother     No Known Problems Father     No Known Problems Brother     No Known Problems Brother     No Known Problems Sister     No Known Problems Maternal Grandmother     No Known Problems Maternal Grandfather     No Known Problems Paternal Grandmother     No Known Problems Paternal Grandfather        Social History     Social History     Socioeconomic History    Marital status: Single     Spouse name: Not on file    Number of children: 2    Years of education: Not on file    Highest education level: Not on file   Occupational History    Occupation: Unemployed   Tobacco Use    Smoking status: Never Smoker    Smokeless tobacco: Never Used   Vaping Use    Vaping Use: Never used   Substance and Sexual Activity    Alcohol use: No     Alcohol/week: 0.0 standard drinks     Comment: rarely    Drug use: No    Sexual activity: Yes     Partners: Female   Other Topics Concern    Not on file   Social History Narrative    Not on file     Social Determinants of Health     Financial Resource Strain:     Difficulty of Paying Living Expenses: Not on file   Food Insecurity:     Worried About Running Out of Food in the Last Year: Not on file    Lani of Food in the Last Year: Not on file   Transportation Needs:     Lack of Transportation (Medical): Not on file    Lack of Transportation (Non-Medical):  Not on file   Physical Activity:     Days of Exercise per Week: Not on file    Minutes of Exercise per Session: Not on file   Stress:     Feeling of Stress : Not on file   Social Connections:     Frequency of Communication with Friends and Family: Not on file    Frequency of Social Gatherings with Friends and Family: Not on file    Attends Mormonism Services: Not on file    Active Member of Clubs or Organizations: Not on file    Attends Club or Organization Meetings: Not on file    Marital Status: Not on file   Intimate Partner Violence:     Fear of Current or Ex-Partner: Not on file    Emotionally Abused: Not on file    Physically Abused: Not on file    Sexually Abused: Not on file   Housing Stability:     Unable to Pay for Housing in the Last Year: Not on file    Number of [place] in the Last Year: Not on file    Unstable Housing in the Last Year: Not on file       Allergies     No Known Allergies    Medications     Current Outpatient Medications   Medication Sig Dispense Refill    amoxicillin-clavulanate (AUGMENTIN) 875-125 MG per tablet Take 1 tablet by mouth 2 times daily for 10 days 20 tablet 0    atorvastatin (LIPITOR) 20 MG tablet Take 20 mg by mouth daily      allopurinol (ZYLOPRIM) 300 MG tablet TAKE 1 TABLET BY MOUTH DAILY 90 tablet 0    Vitamin D (CHOLECALCIFEROL) 50 MCG (2000 UT) TABS tablet Take 1 tablet by mouth daily Therapeutic Dose. 60 tablet 0    amLODIPine-valsartan (EXFORGE) 5-160 MG per tablet Take 1 tablet by mouth daily (Patient not taking: Reported on 5/3/2022)      indomethacin (INDOCIN) 25 MG capsule Take 2 capsules by mouth 3 times daily (Patient not taking: Reported on 5/3/2022) 180 capsule 11    Blood Pressure KIT Dispense one bp kit 1 kit 0     No current facility-administered medications for this visit. Review of Systems     Review of Systems   Constitutional: Negative for chills, fatigue and fever. HENT: Positive for ear discharge, ear pain and hearing loss. Negative for congestion, facial swelling, nosebleeds, postnasal drip, rhinorrhea, sinus pressure, sinus pain, sneezing, sore throat, tinnitus, trouble swallowing and voice change. Eyes: Negative for photophobia, pain, redness, itching and visual disturbance. Respiratory: Negative for cough, choking, shortness of breath and stridor. Gastrointestinal: Negative for diarrhea and nausea. Musculoskeletal: Negative for neck pain and neck stiffness. Skin: Negative for color change and rash. Neurological: Negative for dizziness, facial asymmetry and light-headedness. Hematological: Negative for adenopathy. Psychiatric/Behavioral: Negative for agitation and confusion. PhysicalExam     Vitals:    05/03/22 0927   BP: 133/88   Pulse: 83   Temp:        Physical Exam  Constitutional:       Appearance: He is well-developed. HENT:      Head: Normocephalic and atraumatic. Jaw: No trismus. Right Ear: Tympanic membrane, ear canal and external ear normal. No drainage. No middle ear effusion. There is impacted cerumen. Tympanic membrane is not perforated. Left Ear: Tympanic membrane, ear canal and external ear normal. No drainage. No middle ear effusion. There is impacted cerumen. Tympanic membrane is not perforated. Ears:        Nose: No septal deviation, mucosal edema or rhinorrhea. Mouth/Throat:      Dentition: Normal dentition. Pharynx: Uvula midline. No oropharyngeal exudate. Eyes:      General: No scleral icterus. Right eye: No discharge. Left eye: No discharge. Pupils: Pupils are equal, round, and reactive to light. Neck:      Thyroid: No thyromegaly. Trachea: Phonation normal. No tracheal deviation. Pulmonary:      Effort: Pulmonary effort is normal. No respiratory distress. Breath sounds: No stridor. Musculoskeletal:      Cervical back: Neck supple. Lymphadenopathy:      Cervical: No cervical adenopathy. Skin:     General: Skin is warm and dry. Neurological:      Mental Status: He is alert and oriented to person, place, and time. Cranial Nerves: No cranial nerve deficit. Psychiatric:         Behavior: Behavior normal.           Procedure     Removal Impacted Cerumen    An operating microscope was utilized to visualize the external auditory canals using a 4mm speculum. Cerumen was removed with curettes and guzman suctions on both sides.   The tympanic membrane is intact. No fluid visualized in the middle ear. No complications. Assessment and Plan     1. Bilateral impacted cerumen  Bilateral impacted cerumen removed with instruments today. Patient reports significant improvement of hearing. Recommend follow-up in 6 months for repeat cleaning. Discussed use of Debrox, vinegar alcohol mixture after swimming. 2. Abscess of left ear canal  Patient has an abscess of the left tragus. It does have a head and opening and some crusting and an area of drainage. I recommend conservative management with warm compresses and oral antibiotics at this time. Proper administration and risks of medication discussed. If symptoms do not improve after 2 weeks, he should call for follow-up and we will likely drain the abscess in the office  - amoxicillin-clavulanate (AUGMENTIN) 875-125 MG per tablet; Take 1 tablet by mouth 2 times daily for 10 days  Dispense: 20 tablet; Refill: 0      Return in about 6 months (around 11/3/2022). Portions of this note were dictated using Dragon.  There may be linguistic errors secondary to the use of this program.

## 2022-07-18 DIAGNOSIS — M1A.09X0 IDIOPATHIC CHRONIC GOUT OF MULTIPLE SITES WITHOUT TOPHUS: ICD-10-CM

## 2022-07-18 RX ORDER — ALLOPURINOL 300 MG/1
300 TABLET ORAL DAILY
Qty: 90 TABLET | Refills: 0 | OUTPATIENT
Start: 2022-07-18 | End: 2023-07-18

## 2022-11-01 ENCOUNTER — OFFICE VISIT (OUTPATIENT)
Dept: ENT CLINIC | Age: 57
End: 2022-11-01
Payer: MEDICAID

## 2022-11-01 VITALS
BODY MASS INDEX: 46.38 KG/M2 | SYSTOLIC BLOOD PRESSURE: 146 MMHG | HEIGHT: 68 IN | DIASTOLIC BLOOD PRESSURE: 104 MMHG | WEIGHT: 306 LBS | HEART RATE: 96 BPM

## 2022-11-01 DIAGNOSIS — H61.23 BILATERAL IMPACTED CERUMEN: Primary | ICD-10-CM

## 2022-11-01 PROCEDURE — 69210 REMOVE IMPACTED EAR WAX UNI: CPT | Performed by: OTOLARYNGOLOGY

## 2022-11-01 NOTE — PROGRESS NOTES
[facility]  [address], [address] [facility], [facility] [address]  P: [phone]  F: [phone]       Patient     Abena Reinoso  1965    ChiefComplaint     Chief Complaint   Patient presents with    Follow-up     Patient is here today to have both of his ears cleaned       History of Present Illness     Abena Reinoso is a pleasant 61 y.o. male here for routine ear cleaning    Past Medical History     Past Medical History:   Diagnosis Date    Arthritis     Chicken pox     Chronic GERD 7/19/2018    COVID-19 virus infection 11/22/2020    Diabetes mellitus ([address]     per H&P    Dizziness     CORTES (generalized anxiety disorder)     Gout     Hyperlipidemia     Obstructive sleep apnea syndrome 08/08/2016    doesn't use CPAP    Panic attacks     Pneumonia due to COVID-19 virus 12/4/2020    Rash     Vitamin D deficiency 8/3/2015       Past Surgical History     Past Surgical History:   Procedure Laterality Date    COLONOSCOPY  2017    Per pt' was nml.     HAND SURGERY      KNEE ARTHROSCOPY Right 09/14/2017    RIGHT KNEE ARTHROSCOPY WITH PARTIAL MEDIAL AND LATERAL MENISCECTOMY,CHONDROPLASTY,SYNOVECTOMY    KNEE SURGERY      left meniscus    MENISCECTOMY Left     TONSILLECTOMY      UPPER GASTROINTESTINAL ENDOSCOPY N/A 9/10/2018    EGD BIOPSY performed by Tato Hairston DO at [address] EXTRACTION         Family History     Family History   Problem Relation Age of Onset    Elevated Lipids Mother     High Cholesterol Mother     No Known Problems Father     No Known Problems Brother     No Known Problems Brother     No Known Problems Sister     No Known Problems Maternal Grandmother     No Known Problems Maternal Grandfather     No Known Problems Paternal Grandmother     No Known Problems Paternal Grandfather        Social History     Social History     Socioeconomic History    Marital status: Single     Spouse name: Not on file    Number of children: 2    Years of education: Not on file Highest education level: Not on file   Occupational History    Occupation: Unemployed   Tobacco Use    Smoking status: Never    Smokeless tobacco: Never   Vaping Use    Vaping Use: Never used   Substance and Sexual Activity    Alcohol use: No     Alcohol/week: 0.0 standard drinks     Comment: rarely    Drug use: No    Sexual activity: Yes     Partners: Female   Other Topics Concern    Not on file   Social History Narrative    Not on file     Social Determinants of Health     Financial Resource Strain: Not on file   Food Insecurity: Not on file   Transportation Needs: Not on file   Physical Activity: Not on file   Stress: Not on file   Social Connections: Not on file   Intimate Partner Violence: Not on file   Housing Stability: Not on file       Allergies     No Known Allergies    Medications     Current Outpatient Medications   Medication Sig Dispense Refill    amLODIPine-valsartan (EXFORGE) 5-160 MG per tablet Take 1 tablet by mouth daily      atorvastatin (LIPITOR) 20 MG tablet Take 20 mg by mouth daily      Blood Pressure KIT Dispense one bp kit 1 kit 0    Vitamin D (CHOLECALCIFEROL) 50 MCG (2000 UT) TABS tablet Take 1 tablet by mouth daily Therapeutic Dose. 60 tablet 0    indomethacin (INDOCIN) 25 MG capsule Take 2 capsules by mouth 3 times daily (Patient not taking: Reported on 5/3/2022) 180 capsule 11    allopurinol (ZYLOPRIM) 300 MG tablet TAKE 1 TABLET BY MOUTH DAILY 90 tablet 0     No current facility-administered medications for this visit. Review of Systems     Review of Systems      PhysicalExam     Vitals:    11/01/22 1258   BP: (!) 146/104   Pulse: 96       Physical Exam      Procedure     Removal Impacted Cerumen    An operating microscope was utilized to visualize the external auditory canals using a 4mm speculum. Cerumen was removed with curettes and guzman suctions on both sides. The tympanic membrane is intact. No fluid visualized in the middle ear. No complications.         Assessment and Plan     1. Bilateral impacted cerumen  Bilateral cerumen removed with instruments. Patient reports improvement of hearing. Recommend sweet oil to help soften the wax. Follow-up 6 months. Return in about 6 months (around 5/1/2023). Portions of this note were dictated using Dragon.  There may be linguistic errors secondary to the use of this program.

## 2022-11-17 ENCOUNTER — OFFICE VISIT (OUTPATIENT)
Dept: ORTHOPEDIC SURGERY | Age: 57
End: 2022-11-17
Payer: MEDICAID

## 2022-11-17 VITALS — BODY MASS INDEX: 46.23 KG/M2 | HEIGHT: 68 IN | WEIGHT: 305 LBS

## 2022-11-17 DIAGNOSIS — M16.12 PRIMARY OSTEOARTHRITIS OF LEFT HIP: Primary | ICD-10-CM

## 2022-11-17 PROCEDURE — 99203 OFFICE O/P NEW LOW 30 MIN: CPT | Performed by: ORTHOPAEDIC SURGERY

## 2022-11-17 PROCEDURE — G8484 FLU IMMUNIZE NO ADMIN: HCPCS | Performed by: ORTHOPAEDIC SURGERY

## 2022-11-17 PROCEDURE — G8417 CALC BMI ABV UP PARAM F/U: HCPCS | Performed by: ORTHOPAEDIC SURGERY

## 2022-11-17 PROCEDURE — 1036F TOBACCO NON-USER: CPT | Performed by: ORTHOPAEDIC SURGERY

## 2022-11-17 PROCEDURE — 3017F COLORECTAL CA SCREEN DOC REV: CPT | Performed by: ORTHOPAEDIC SURGERY

## 2022-11-17 PROCEDURE — G8427 DOCREV CUR MEDS BY ELIG CLIN: HCPCS | Performed by: ORTHOPAEDIC SURGERY

## 2022-11-18 NOTE — PROGRESS NOTES
[facility]  Office Visit    Chief Complaint: Left hip pain    HPI:  Edmar Eldridge is a 61 y.o. who is here for initial assessment of left hip pain. He reports left hip pain for many years that has been worsening recently. He was last seen for this in 2020 and had no fractures. He has been treated in the past with a steroid injection about 1 year ago minimal to no relief of symptoms. He currently has left hip pain on a daily basis. The pain is anterior in his groin wraparound laterally as well. He denies low back pain or radiating pain down the leg. The patient has difficulty putting on socks and shoes, difficulty getting out of a car, difficulty with ambulation and doing activities of daily living including pain at night. Pain at rest is 7 and with activities 9-10/10. He denies diabetes, tobacco use, history of blood clots. He does have help at home. BMI is 46.       Patient Active Problem List   Diagnosis    Staph skin infection    Microalbuminuria    Hyperglycemia    Polyuria    Failure of erection    Chronic kidney disease, stage II (mild)    Benign paroxysmal positional nystagmus    Erectile dysfunction    Vitamin D deficiency    Allergic rhinitis    Benign paroxysmal positional vertigo    Morbid obesity with BMI of 50.0-59.9, adult ([facility] [address]    HLD (hyperlipidemia)    Obstructive sleep apnea syndrome    Panic attack    Controlled type 2 diabetes mellitus without complication, without long-term current use of insulin (HCC)    Primary osteoarthritis of both knees    Complex tear of lateral meniscus of right knee as current injury    Complex tear of medial meniscus of right knee as current injury    Degenerative disc disease, cervical    Idiopathic chronic gout of multiple sites without tophus    Chronic GERD    Hemorrhoid    Numbness    Hand pain, right    Arthritis of carpometacarpal (CMC) joint of right thumb    Enlarged lymph node    Groin pain, left    Pneumonia due to COVID-19 virus       ROS:  Constitutional: denies fever, chills, weight loss  MSK: denies pain in other joints, muscle aches  Neurological: denies numbness, tingling, weakness    Exam:  Height 5' 8\" (1.727 m), weight (!) 305 lb (138.3 kg). Appearance: sitting in exam room chair, appears to be in no acute distress, awake and alert  Resp: unlabored breathing on room air  Skin: warm, dry and intact with out erythema or significant increased temperature  Neuro: grossly intact both lower extremities. Intact sensation to light touch. Motor exam 4+ to 5/5 in all major motor groups. LLE: Examination demonstrates pain with logroll and Stinchfield. There is brisk capillary refill. Strength is 5/5 in hamstrings, quads, hip flexors. Imaging:  AP pelvis, AP and frog-leg lateral views of the left hip were performed and interpreted today. Significant for severe left hip arthritis with no remaining joint space. There are periarticular arthritis with subchondral sclerosis. Assessment:  Severe left hip osteoarthritis    Plan:  We discussed the diagnosis and treatment options. He has severe left hip osteoarthritis. We discussed continued treatment with NSAIDs, steroid injection, total hip arthroplasty. Given the severity of his arthritis, I recommended total hip arthroplasty. The operative procedure, alternatives, and risks were discussed in detail with the patient. The risks include but are not limited to: Infection, vessel injury, nerve injury, DVT, pulmonary embolism, implant loosening, need for revision surgery, leg length discrepancy, dislocation, lateral femoral cutaneous nerve palsy, intraoperative fracture. Despite these risks the patient would like to proceed. All questions have been answered and no guarantees were made. I discussed with the patient the diagnosis in detail and answered all questions.   The patient verbalized understanding of the plan as it has been described above and is in agreement. For anterior left total hip arthroplasty. We did discuss that BMI over 40 is increased risk of complications including infection. He will continue to try to lose weight preoperatively. He has already lost 40 pounds. Total time spent on today's encounter was at least 32 minutes. This time included reviewing prior notes, radiographs, and lab results when available, reviewing history obtained by medical assistant, performing history and physical exam, reviewing tests/radiographs with the patient, counseling the patient, ordering medications or tests, documentation in the electronic health record, and coordination of care. This dictation was done with Hello Curry dictation and may contain mechanical errors related to translation.

## 2022-12-16 ENCOUNTER — TELEPHONE (OUTPATIENT)
Dept: ORTHOPEDIC SURGERY | Age: 57
End: 2022-12-16

## 2022-12-16 NOTE — TELEPHONE ENCOUNTER
OTHER:    PER PATIENT INSURANCE IS CHANGING TO [facility]/ID#[id number]/11/23/22. PATIENT WILL CALL [facility] TO VERIFY IF VALID NOW OR BEGINNING 1/1/2023.     [address] [facility]URSING HOME FOR 3/1/2023

## 2022-12-16 NOTE — TELEPHONE ENCOUNTER
PER PATIENT INSURANCE IS CHANGING TO [facility]/ID#[id number]. UPDATED IN RECORD.

## 2023-02-17 ENCOUNTER — TELEPHONE (OUTPATIENT)
Dept: ORTHOPEDIC SURGERY | Age: 58
End: 2023-02-17

## 2023-02-17 DIAGNOSIS — M16.12 PRIMARY OSTEOARTHRITIS OF LEFT HIP: ICD-10-CM

## 2023-02-17 LAB
ABO/RH: NORMAL
ALBUMIN SERPL-MCNC: 4.7 G/DL (ref 3.4–5)
ANION GAP SERPL CALCULATED.3IONS-SCNC: 11 MMOL/L (ref 3–16)
ANTIBODY SCREEN: NORMAL
APTT: 33.3 SEC (ref 23–34.3)
BACTERIA: ABNORMAL /HPF
BASOPHILS ABSOLUTE: 0 K/UL (ref 0–0.2)
BASOPHILS RELATIVE PERCENT: 0.9 %
BILIRUBIN URINE: NEGATIVE
BLOOD, URINE: NEGATIVE
BUN BLDV-MCNC: 8 MG/DL (ref 7–20)
CALCIUM SERPL-MCNC: 9.9 MG/DL (ref 8.3–10.6)
CHLORIDE BLD-SCNC: 104 MMOL/L (ref 99–110)
CLARITY: CLEAR
CO2: 26 MMOL/L (ref 21–32)
COLOR: YELLOW
CREAT SERPL-MCNC: 1 MG/DL (ref 0.9–1.3)
EOSINOPHILS ABSOLUTE: 0.1 K/UL (ref 0–0.6)
EOSINOPHILS RELATIVE PERCENT: 2.4 %
EPITHELIAL CELLS, UA: 3 /HPF (ref 0–5)
GFR SERPL CREATININE-BSD FRML MDRD: >60 ML/MIN/{1.73_M2}
GLUCOSE BLD-MCNC: 88 MG/DL (ref 70–99)
GLUCOSE URINE: NEGATIVE MG/DL
HCT VFR BLD CALC: 42.8 % (ref 40.5–52.5)
HEMOGLOBIN: 14.3 G/DL (ref 13.5–17.5)
HYALINE CASTS: 0 /LPF (ref 0–8)
INR BLD: 0.97 (ref 0.87–1.14)
KETONES, URINE: NEGATIVE MG/DL
LEUKOCYTE ESTERASE, URINE: ABNORMAL
LYMPHOCYTES ABSOLUTE: 1.4 K/UL (ref 1–5.1)
LYMPHOCYTES RELATIVE PERCENT: 37.9 %
MCH RBC QN AUTO: 29.3 PG (ref 26–34)
MCHC RBC AUTO-ENTMCNC: 33.4 G/DL (ref 31–36)
MCV RBC AUTO: 87.8 FL (ref 80–100)
MICROSCOPIC EXAMINATION: YES
MONOCYTES ABSOLUTE: 0.4 K/UL (ref 0–1.3)
MONOCYTES RELATIVE PERCENT: 9.6 %
NEUTROPHILS ABSOLUTE: 1.8 K/UL (ref 1.7–7.7)
NEUTROPHILS RELATIVE PERCENT: 49.2 %
NITRITE, URINE: NEGATIVE
PDW BLD-RTO: 14.5 % (ref 12.4–15.4)
PH UA: 8 (ref 5–8)
PLATELET # BLD: 253 K/UL (ref 135–450)
PMV BLD AUTO: 8.9 FL (ref 5–10.5)
POTASSIUM SERPL-SCNC: 4.5 MMOL/L (ref 3.5–5.1)
PREALBUMIN: 24.2 MG/DL (ref 20–40)
PROTEIN UA: ABNORMAL MG/DL
PROTHROMBIN TIME: 12.8 SEC (ref 11.7–14.5)
RBC # BLD: 4.88 M/UL (ref 4.2–5.9)
RBC UA: 1 /HPF (ref 0–4)
SODIUM BLD-SCNC: 141 MMOL/L (ref 136–145)
SPECIFIC GRAVITY UA: 1.02 (ref 1–1.03)
URINE REFLEX TO CULTURE: ABNORMAL
URINE TYPE: ABNORMAL
UROBILINOGEN, URINE: 1 E.U./DL
VITAMIN D 25-HYDROXY: 48.7 NG/ML
WBC # BLD: 3.7 K/UL (ref 4–11)
WBC UA: 6 /HPF (ref 0–5)

## 2023-02-18 LAB
ESTIMATED AVERAGE GLUCOSE: 114 MG/DL
HBA1C MFR BLD: 5.6 %
MRSA SCREEN RT-PCR: ABNORMAL
ORGANISM: ABNORMAL

## 2023-02-20 ENCOUNTER — HOSPITAL ENCOUNTER (OUTPATIENT)
Dept: PREADMISSION TESTING | Age: 58
Discharge: HOME | End: 2023-02-24
Payer: COMMERCIAL

## 2023-02-20 DIAGNOSIS — M16.12 PRIMARY OSTEOARTHRITIS OF LEFT HIP: ICD-10-CM

## 2023-02-20 LAB
EKG ATRIAL RATE: 84 BPM
EKG DIAGNOSIS: NORMAL
EKG P AXIS: 45 DEGREES
EKG P-R INTERVAL: 156 MS
EKG Q-T INTERVAL: 348 MS
EKG QRS DURATION: 70 MS
EKG QTC CALCULATION (BAZETT): 411 MS
EKG R AXIS: -14 DEGREES
EKG T AXIS: -2 DEGREES
EKG VENTRICULAR RATE: 84 BPM

## 2023-02-20 PROCEDURE — 93005 ELECTROCARDIOGRAM TRACING: CPT

## 2023-02-20 PROCEDURE — 93010 ELECTROCARDIOGRAM REPORT: CPT | Performed by: INTERNAL MEDICINE

## 2023-02-20 NOTE — PROGRESS NOTES
Patient attended JET class on 2/20/2023 . Patient verified surgery for Total Hip replacement. Patient received patient information and educational JET folder including the following handouts: jet powerpoint, covid-19 restrictions, ERAS, incentive spirometry including purpose and how to perform, case management contact information, hand hygiene, preventing constipation, home health care agency list, skilled nursing facility list, pre-operative showering techniques and the use of anti-septic 3 days before surgery. Interviews completed by PT, OT, and Nurse Navigator. Labs and Tests completed as ordered/necessary. Anti-septic bottle given to patient to take home. Patient states no further questions or concerns. Patient provided orthopedic office, case management, and nurse navigator contact information. Date Of Surgery: 3/1/2023  Surgeon: Dr Angy Real  Per Patient Will see/Saw Primary care provider on 2/21/2023. HISTORY OF JOINT REPLACEMENT(S): No history of joint replacement    DC Plan: Home    HOME ASSISTANCE - WHO WILL BE STAYING WITH YOU AT HOME FOR FIRST SEVERAL DAYS? friend or mother    DC TRANSPORTATION:  mother    STEPS INTO HOME: 8    STEPS TO BATHROOM/BEDROOM: 1 flight, Able to live on the main level. DME NEEDS:  Denies durable medical equipment needs at this time, already has a rolling walker. LENGTH OF STAY HAS BEEN DISCUSSED WITH THE PATIENT, APPROPRIATE TO HIS/ HER PROCEDURE. PATIENT HAS BEEN INFORMED THAT THEY WILL BE DISCHARGED WHEN THE PHYSICIAN DEEMS THEM MEDICALLY READY. MOST PATIENTS CAN EXPECT TO BE IN THE HOSPITAL ONE NIGHT AS AN OBSERVATION ONLY, OR 1-2 DAYS AS AN ADMISSION FOR THOSE WITH MEDICAL HEALTH ISSUES/COMPLICATIONS. CHOICES FOR HHC, DME VENDORS AND SKILLED/ REHAB FACILITIES PROVIDED TO PATIENT DURING THIS INTERVIEW. HOME CARE CHOICE(S): open to any agency, home health care list was provided to patient. .    SNF/REHAB CHOICES (S):     Declined a need for skilled nursing facility. MEDS TO BEDS FROM [employer]: Patient is agreeable to have medications filled via meds to beds program with [facility]. The Patient and/or patient representative was provided with a choice of provider and agrees   with the discharge plan. [x] Yes [] No    Freedom of choice list was provided with basic dialogue that supports the patient's individualized plan of care/goals, treatment preferences and shares the quality data associated with the providers. [x] Yes [] No    Facesheet with discharge needs provided to [facility] / .

## 2023-02-21 ENCOUNTER — TELEPHONE (OUTPATIENT)
Dept: ORTHOPEDIC SURGERY | Age: 58
End: 2023-02-21

## 2023-02-21 RX ORDER — AMOXICILLIN 500 MG/1
500 CAPSULE ORAL 3 TIMES DAILY
COMMUNITY
Start: 2023-02-13

## 2023-02-21 NOTE — TELEPHONE ENCOUNTER
----- Message from Solange Qureshi MD sent at 2/20/2023  7:58 AM EST -----  +MRSA    ----- Message -----  From: Miguel Blanco Incoming Lab Results From [facility] ([facility])  Sent: 2/17/2023   5:53 PM EST  To: Solange Qureshi MD

## 2023-02-21 NOTE — FLOWSHEET NOTE
Preoperative Screening for Elective Surgery/Invasive Procedures While COVID-19 present in the community     Have you tested positive or have been told to self-isolate for COVID-19 like symptoms within the past 28 days? Do you currently have any of the following symptoms? Fever >100.0 F or 99.9 F in immunocompromised patients? New onset cough, shortness of breath or difficulty breathing? New onset sore throat, myalgia (muscle aches and pains), headache, loss of taste/smell or diarrhea? Have you had a potential exposure to COVID-19 within the past 14 days by:  Close contact with a confirmed case? Close contact with a healthcare worker,  or essential infrastructure worker (grocery store, [employer], gas station, public utilities or transportation)? Do you reside in a congregate setting such as; skilled nursing facility, adult home, correctional facility, homeless shelter or other institutional setting? Have you had recent travel to a known COVID-19 hotspot? No to all above     * Admitted with diarrhea? [] YES    [x]  NO     *Prior history of C-Diff. In last 3 months? [] YES    [x]  NO     *Antibiotic use in the past 6-8 weeks? []  NO    [x]  YES      If yes, which: REASON_congestion________________     *Prior hospitalization or nursing home in the last month? []  YES    [x]  NO     SAFETY FIRST. .call before you fall    [facility] time___3/1/23 time to be determined_________        Surgery time____________    Do not eat or drink anything after 12:00 midnight prior to your surgery. This includes water chewing gum, mints and ice chips- the Day of Surgery. You may brush your teeth and gargle the morning of your surgery, but do not swallow the water     Please see your family doctor/pediatrician for a history and physical and/or questions concerning medications.    Bring any test results/reports from your physicians office. If you are under the care of a heart doctor or specialist doctor, please be aware that you may be asked to them for clearance    You may be asked to stop blood thinners such as Coumadin, Plavix, Fragmin, Lovenox, etc., or any anti-inflammatories such as:  Aspirin, Ibuprofen, Advil, Naproxen prior to your surgery. We also ask that you stop any OTC medications such as fish oil, vitamin E, glucosamine, garlic, Multivitamins, COQ 10, etc.    We ask that you do not smoke 24 hours prior to surgery  We ask that you do not  drink any alcoholic beverages 24 hours prior to surgery     You must make arrangements for a responsible adult to take you home after your surgery. For your safety you will not be allowed to leave alone or drive yourself home. Your surgery will be cancelled if you do not have a ride home. Also for your safety, it is strongly suggested that someone stay with you the first 24 hours after your surgery. A parent or legal guardian must accompany a child scheduled for surgery and plan to stay at the hospital until the child is discharged. Please do not bring other children with you. For your comfort, please wear simple loose fitting clothing to the hospital.  Please do not bring valuables. Do not wear any make-up or nail polish on your fingers or toes. For your safety, please do not wear any jewelry or body piercing's on the day of surgery. All jewelry must be removed. If you have dentures, they will be removed before going to operating room. For your convenience, we will provide you with a container. If you wear contact lenses or glasses, they will be removed, please bring a case for them. If you have a living will and a durable power of  for healthcare, please bring in a copy.      As part of our patient safety program to minimize surgical site infections, we ask you to do the following:    Please notify your surgeon if you develop any illness between now and the day of your surgery. This includes a cough, cold, fever, sore throat, nausea,         or vomiting, and diarrhea, etc.   Please notify your surgeon if you experience dizziness, shortness         of breath or blurred vision between now and the time of your surgery. Do not shave your operative site 96 hours prior to surgery. For face and neck surgery, men may use an electric razor 48 hours   prior to surgery. You may shower the night before surgery or the morning of   your surgery with an antibacterial soap. You will need to bring a photo ID and insurance card     If you use a C-pap or Bi-pap machine, please bring your machine with you to the hospital     Our goal is to provide you with excellent care, therefore, visitors will be limited to so that we may focus on providing this care for you. Please contact your surgeon office, if you have any further questions. [facility] phone number:  [phone] PAT fax number:  [phone]    Please note these are generalized instructions for all surgical cases, you may be provided with more specific instructions according to your surgery.

## 2023-02-21 NOTE — FLOWSHEET NOTE
DOS 3/1/23   63    Pre-op H&P: Pt. Went to [employer] on [facility] ([facility]) [phone]. At the time of the PAT call, her note was not complete. Please make sure that it is done and that he has MEDICAL clearance.

## 2023-02-21 NOTE — PROGRESS NOTES
Notification sent to Dr Carlton Nissen and medical assistant Yadira CHAVIRA regarding abnormal preoperative labs and pertinent medical history.

## 2023-02-23 ENCOUNTER — OFFICE VISIT (OUTPATIENT)
Dept: ORTHOPEDIC SURGERY | Age: 58
End: 2023-02-23
Payer: COMMERCIAL

## 2023-02-23 VITALS — RESPIRATION RATE: 16 BRPM | HEIGHT: 68 IN | BODY MASS INDEX: 42.44 KG/M2 | WEIGHT: 280 LBS

## 2023-02-23 DIAGNOSIS — M16.12 PRIMARY OSTEOARTHRITIS OF LEFT HIP: Primary | ICD-10-CM

## 2023-02-23 PROCEDURE — 1036F TOBACCO NON-USER: CPT | Performed by: ORTHOPAEDIC SURGERY

## 2023-02-23 PROCEDURE — G8484 FLU IMMUNIZE NO ADMIN: HCPCS | Performed by: ORTHOPAEDIC SURGERY

## 2023-02-23 PROCEDURE — 3017F COLORECTAL CA SCREEN DOC REV: CPT | Performed by: ORTHOPAEDIC SURGERY

## 2023-02-23 PROCEDURE — G8417 CALC BMI ABV UP PARAM F/U: HCPCS | Performed by: ORTHOPAEDIC SURGERY

## 2023-02-23 PROCEDURE — 99214 OFFICE O/P EST MOD 30 MIN: CPT | Performed by: ORTHOPAEDIC SURGERY

## 2023-02-23 PROCEDURE — G8427 DOCREV CUR MEDS BY ELIG CLIN: HCPCS | Performed by: ORTHOPAEDIC SURGERY

## 2023-02-23 NOTE — PROGRESS NOTES
[facility]  Office Visit    Chief Complaint: Left hip pain    HPI:  Jm Francisco is a 57 y.o. who is here in follow-up of left hip pain.  He is scheduled for left total hip arthroplasty next week.  For review, he reports left hip pain for many years that has been worsening recently. He has been treated in the past with a steroid injection about 1 year ago minimal to no relief of symptoms.  He currently has left hip pain on a daily basis.  The pain is anterior in his groin wraparound laterally as well.  He denies low back pain or radiating pain down the leg. The patient has difficulty putting on socks and shoes, difficulty getting out of a car, difficulty with ambulation and doing activities of daily living including pain at night.  Pain at rest is 7 and with activities 9-10/10.     He denies diabetes, tobacco use, history of blood clots.  He does have help at home.  BMI is 42.      Patient Active Problem List   Diagnosis    Staph skin infection    Microalbuminuria    Hyperglycemia    Polyuria    Failure of erection    Chronic kidney disease, stage II (mild)    Benign paroxysmal positional nystagmus    Erectile dysfunction    Vitamin D deficiency    Allergic rhinitis    Benign paroxysmal positional vertigo    Morbid obesity with BMI of 50.0-59.9, adult (HCC)    HLD (hyperlipidemia)    Obstructive sleep apnea syndrome    Panic attack    Controlled type 2 diabetes mellitus without complication, without long-term current use of insulin ([facility])    Primary osteoarthritis of both knees    Complex tear of lateral meniscus of right knee as current injury    Complex tear of medial meniscus of right knee as current injury    Degenerative disc disease, cervical    Idiopathic chronic gout of multiple sites without tophus    Chronic GERD    Hemorrhoid    Numbness    Hand pain, right    Arthritis of carpometacarpal (CMC) joint of right thumb    Enlarged lymph node    Groin pain, left    Pneumonia  due to COVID-19 virus       ROS:  Constitutional: denies fever, chills, weight loss  MSK: denies pain in other joints, muscle aches  Neurological: denies numbness, tingling, weakness    Exam:  Resp. rate 16, height 5' 8\" (1.727 m), weight 280 lb (127 kg). Appearance: sitting in exam room chair, appears to be in no acute distress, awake and alert  Resp: unlabored breathing on room air  Skin: warm, dry and intact with out erythema or significant increased temperature  Neuro: grossly intact both lower extremities. Intact sensation to light touch. Motor exam 4+ to 5/5 in all major motor groups. LLE: Examination demonstrates pain with logroll and Stinchfield. There is brisk capillary refill. Strength is 5/5 in hamstrings, quads, hip flexors. Imaging:  Prior left hip radiographs were reviewed today. Significant for severe left hip arthritis with no remaining joint space. There are periarticular arthritis with subchondral sclerosis. Assessment:  Severe left hip osteoarthritis    Plan:  We discussed the diagnosis and treatment options. He has severe left hip osteoarthritis. We discussed continued treatment with NSAIDs, steroid injection, total hip arthroplasty. Given the severity of his arthritis, I recommended total hip arthroplasty. The operative procedure, alternatives, and risks were discussed in detail with the patient. The risks include but are not limited to: Infection, vessel injury, nerve injury, DVT, pulmonary embolism, implant loosening, need for revision surgery, leg length discrepancy, dislocation, lateral femoral cutaneous nerve palsy, intraoperative fracture. Despite these risks the patient would like to proceed. All questions have been answered and no guarantees were made. I discussed with the patient the diagnosis in detail and answered all questions. The patient verbalized understanding of the plan as it has been described above and is in agreement.     For anterior left total hip arthroplasty. We did discuss that BMI over 40 is increased risk of complications including infection. He will continue to try to lose weight preoperatively. He has already lost 40 pounds. MRSA screen was positive preoperatively. He is being treated with Bactroban. Total time spent on today's encounter was at least 31 minutes. This time included reviewing prior notes, radiographs, and lab results when available, reviewing history obtained by medical assistant, performing history and physical exam, reviewing tests/radiographs with the patient, counseling the patient, ordering medications or tests, documentation in the electronic health record, and coordination of care. This dictation was done with [place] dictation and may contain mechanical errors related to translation.

## 2023-02-23 NOTE — DISCHARGE INSTR - COC
[address]  Continuity of Care Form    Patient Name:  Aura Valerio  : 1965    MRN:  [id number]    Admit date:  (Not on file)  Discharge date:  3/2/2023    Code Status Order: Prior  Advance Directives: No    Admitting Physician: Saeed Goodwin MD  PCP: Janes Del Rio, APRN - CNP    Discharging Nurse: [facility] Unit/Room#: No information available for this encounter. Discharging Unit Phone Number: [phone]    Emergency Contact:        Past Surgical History:  Past Surgical History:   Procedure Laterality Date    COLONOSCOPY  2017    Per pt' was nml. HAND SURGERY      KNEE ARTHROSCOPY Right 2017    RIGHT KNEE ARTHROSCOPY WITH PARTIAL MEDIAL AND LATERAL MENISCECTOMY,CHONDROPLASTY,SYNOVECTOMY    KNEE SURGERY      left meniscus    MENISCECTOMY Left     TONSILLECTOMY      UPPER GASTROINTESTINAL ENDOSCOPY N/A 9/10/2018    EGD BIOPSY performed by Marta Viveros DO at [facility] [address] EXTRACTION         Immunization History:   Immunization History   Administered Date(s) Administered    Influenza Vaccine, unspecified formulation 10/26/2010, 2012    Influenza Virus Vaccine 2012    Tdap (Boostrix, Adacel) 2017       Active Problems:  Active Problems:    * No active hospital problems. *  Resolved Problems:    * No resolved hospital problems. *      Isolation/Infection:       Nurse Assessment:  Last Vital Signs:Ht 5' 8\" (1.727 m)   Wt 280 lb (127 kg)   BMI 42.57 kg/m²   Last documented pain score (0-10 scale):    Last Weight:   Wt Readings from Last 1 Encounters:   23 280 lb (127 kg)     Mental Status:  oriented and alert     IV Access:  - None    Nursing Mobility/ADLs:  Walking   Assisted  Transfer  Assisted  Bathing  Assisted  Dressing  Assisted  Toileting  Assisted  Feeding  Independent  Med Admin  Independent  Med Delivery   whole    Wound Care Documentation and Therapy:  Keep glued Prineo dressing clean and intact. DO NOT remove.  Prineo is waterproof for showering. Doctor will evaluated dressing for removal at office visit 2 weeks after surgery        Elimination:  Urinary Catheter: None   Colostomy/Ileostomy: No  Continence: Bowel: Yes  Bladder: Yes  Date of Last BM:  3/2/2023    Intake/Output Summary (Last 24 hours)   No intake or output data in the 24 hours ending 02/23/23 3221  Safety Concerns: At Risk for Falls    Impairments/Disabilities:      Vision    Nutrition Therapy:  Current Nutrition Therapy: general  Routes of Feeding: Oral  Liquids: No Restrictions  Daily Fluid Restriction: no  Last Modified Barium Swallow with Video (Video Swallowing Test): not done    Treatments at the Time of Hospital Discharge:   Respiratory Treatments:   Oxygen Therapy:  is not on home oxygen therapy.   Ventilator:    - No ventilator support    Lab orders for discharge:        Rehab Therapies: Physical Therapy, Occupational Therapy   Weight Bearing Status/Restrictions: No weight bearing restirctions  Other Medical Equipment (for information only, NOT a DME order):  Rolling walker  Other Treatments: Eliquis bid for 14 total days after discharge from hospital for DVT prophylaxis , bilateral CHELSEY hose for 2 weeks after surgery  HOME HEALTH CARE: [facility] [address] [place] [address] to establish plan of care for patient over 60 day period   Nursing  Initial home SN evaluation visit to occur within 24-48 hours for:  medication management  VS and clinical assessment  S&S chronic disease exacerbation education + when to contact MD / NP  care coordination  Medication Reconciliation during 1st SN visit       PT/OT   Evaluate with goal of regaining prior level of functioning   OT to evaluate if patient has [address] needs for personal care      PCP Visit scheduled within 7 days of hospital discharge       Patient's personal belongings (please select all that are sent with patient):  clothing    RN SIGNATURE:  Electronically signed by Nelly Barraza RN on 3/2/23 at 8:31 AM EST    PHYSICIAN SECTION    Prognosis: Good    Condition at Discharge: Stable    Rehab Potential (if transferring to Rehab): Good    Physician Certification: I certify the above orders, information, and transfer of Piter Márquez is necessary for the continuing treatment of the diagnosis listed and that he requires [address] for less 30 days. Update Admission H&P: No change in H&P    PHYSICIAN SIGNATURE:  Electronically signed by CARI Mckeon CNP on 2/23/23 at 2:59 PM SHAHLA/ Dr Felicia Iglesias Date: ***    [facility] Readmission Risk Assessment Score:    Discharging to Facility/ Agency   Name:     [facility]  Address:  [address] [place] [address],  [address], [address] [facility] [address]  Phone:     [phone]  Fax:         [phone]      Dialysis Facility (if applicable)   Name:  Address:  Dialysis Schedule:  Phone:  Fax:    / signature: {Esignature:[id number]}          Followup with Dr Carlton Nissen 2 weeks after surgery in office   [phone]  [facility] and [facility] Medicine, [address], [address] [place] [address],   [phone]     DISCHARGE INSTRUCTIONS FOR TOTAL KNEE REPLACEMENT  Activity:  Elevate your leg if swelling occurs in your ankle. Use elastic wraps/hose until swelling decreases. Continue the exercise program as prescribed by physical therapists. Take frequent walks. Use walker, crutches, or cane with weight bearing instructions as indicated by the physical therapists. Take rest periods often. Elevate leg during rest period. Wound Care:  Cover the wound with a sterile gauze dressing and change daily as long as there is drainage. Do not scrub wound. Pat it dry with a soft towel. Dont apply any lotions or creams to your wound. Check the incision every day for redness, swelling, or increase in drainage. Diet:  You can resume your normal diet.   There are no limits on your diet due to your surgery. Pain pills and activity changes may lead to constipation. To prevent this, use prune juice or bran cereals liberally. You may need to use a laxative such as Dulcolax, Senokot, or Milk of Magnesia. Drink plenty of fluids. Medications: Take pain pills if needed to maintain comfort. Never drive while taking pain medicine. Avoid over the counter medications until checking with your doctor. Resume previous medications as instructed by your doctor. You will be on aspirin or Eliquis  for 14 days only. Stay off other anti-inflammatory medications (except Celebrex)  Call Your Doctor If:  You have increased pain not controlled by medications. Excessive swelling in your ankle. You develop numbness, tingling, or decreased movement. You have a fever greater than 100 degrees for a day or over 101 degrees at any one time. Your wound becomes more reddened, starts draining, or opens. If you fall. You have any questions about your recovery. Inform your family doctor/dentist or any other doctor who cares for you in the future that you have a joint replacement. You may need antibiotics for dental or surgical procedures if there is any evidence of infection present. If you have required the use of insulin to control your blood sugar after surgery, follow up with your family doctor. Call your surgeons office to schedule your appointment to be seen after surgery. Make your appointment to continue physical therapy per doctors orders.   Smoking cessation assistance can be obtained from your family doctor or by calling our @ [phone]    _______________________________   _____   _______________________  ____                Patient Signature              Date      Witness                               Date

## 2023-02-27 NOTE — DISCHARGE INSTRUCTIONS
Wear harjinder hoses (compression stockings) for 2 weeks and to only remove when showering or at bedtime. Please wear Teds to follow up appointment with orthopedic surgeon. If you use a bi-pap/cpap for sleep apnea, please wear when sleeping while taking narcotics. Use your Incentive Spirometer 4 times a day for 1 week after surgery to prevent pneumonia. Use ice packs as needed for swelling and pain. DO NOT apply directly to your skin. Use a clean towel or pillow case as a barrier between your skin and the ice pack. Pain medication and activity changes may lead to constipation. If you stop passing gas or are unable to have a bowel movement, please notify your surgeon for further instructions. Do not go any longer than 2 days without having a bowel movement without notification to your surgeon. When to clean your hands: For patients  In the hospital or in your home, you can come in contact with many harmful germs. To help prevent infection, wash your hands with soap and water often, especially:  Before and After touching or changing a dressing or bandage  After using the bathroom  Before and after eating  After coughing or sneezing  After using a tissue  After touching any object or surface that may be contaminated  After touching an animal during a pet therapy session (hospital)  After touching an animal, cleaning up after a pet, or preparing food for pets (home)    Please contact Rory Lynn or the Orthopedic office with any questions or concerns after your discharge. If you have any issues or concerns after hours please call the Orthopedic Office to reach the Surgeon on call first at  [phone]. If you need a pain medication refill please contact your surgeon's office.      [facility] Orthopedics:    Monday-Friday 8am- 5pm      [address] [facility] Nurse Navigator: Monday-Wednesday 8am- 5pm, Thursday 8am-3pm  [phone]    After Hours Clinic Walk in Clinic  [facility]  [address] [facility]   [facility], [address]    Monday-Friday 5pm-9pm  &  Saturday 9am-1pm          [phone]    If you have a medical emergency please call 911 or seek immediate medical help. [address]   [phone]    Call 911 anytime you think you may need emergency care. For example, call if:  You passed out (lost consciousness). You have severe trouble breathing. You have sudden chest pain and shortness of breath, or you cough up blood. Call your doctor now or seek immediate medical care if:  Your leg or foot turns cold or changes color. You have symptoms of a blood clot in your leg (called a deep vein thrombosis), such as:  Pain in your calf, back of the knee, thigh, or groin. Redness and swelling in your leg or groin. You have symptoms of infection, such as: Increased pain, swelling, warmth, or redness. Red streaks leading from the wound. Pus draining from the wound. A fever. easy bruising, unusual bleeding (nose, mouth, vagina, or rectum), bleeding from wounds or needle injections, any bleeding that will not stop;  heavy menstrual periods;  urine that looks red, pink, or brown; or  black or bloody stools, coughing up blood or vomit that looks like coffee grounds.

## 2023-02-28 ENCOUNTER — ANESTHESIA EVENT (OUTPATIENT)
Dept: OPERATING ROOM | Age: 58
End: 2023-02-28
Payer: COMMERCIAL

## 2023-03-01 ENCOUNTER — ANESTHESIA (OUTPATIENT)
Dept: OPERATING ROOM | Age: 58
End: 2023-03-01
Payer: COMMERCIAL

## 2023-03-01 ENCOUNTER — APPOINTMENT (OUTPATIENT)
Dept: GENERAL RADIOLOGY | Age: 58
End: 2023-03-01
Attending: ORTHOPAEDIC SURGERY
Payer: COMMERCIAL

## 2023-03-01 ENCOUNTER — HOSPITAL ENCOUNTER (OUTPATIENT)
Age: 58
Setting detail: OBSERVATION
Discharge: HOME | End: 2023-03-02
Attending: ORTHOPAEDIC SURGERY | Admitting: ORTHOPAEDIC SURGERY
Payer: COMMERCIAL

## 2023-03-01 DIAGNOSIS — M16.12 PRIMARY OSTEOARTHRITIS OF LEFT HIP: Primary | ICD-10-CM

## 2023-03-01 LAB
ABO/RH: NORMAL
ANTIBODY SCREEN: NORMAL
GLUCOSE BLD-MCNC: 103 MG/DL (ref 70–99)
GLUCOSE BLD-MCNC: 147 MG/DL (ref 70–99)
GLUCOSE BLD-MCNC: 94 MG/DL (ref 70–99)
PERFORMED ON: ABNORMAL
PERFORMED ON: ABNORMAL
PERFORMED ON: NORMAL

## 2023-03-01 PROCEDURE — C1776 JOINT DEVICE (IMPLANTABLE): HCPCS | Performed by: ORTHOPAEDIC SURGERY

## 2023-03-01 PROCEDURE — 3209999900 FLUORO FOR SURGICAL PROCEDURES

## 2023-03-01 PROCEDURE — G0378 HOSPITAL OBSERVATION PER HR: HCPCS

## 2023-03-01 PROCEDURE — 2580000003 HC RX 258: Performed by: ORTHOPAEDIC SURGERY

## 2023-03-01 PROCEDURE — 6360000002 HC RX W HCPCS: Performed by: ORTHOPAEDIC SURGERY

## 2023-03-01 PROCEDURE — 7100000000 HC PACU RECOVERY - FIRST 15 MIN: Performed by: ORTHOPAEDIC SURGERY

## 2023-03-01 PROCEDURE — 3600000015 HC SURGERY LEVEL 5 ADDTL 15MIN: Performed by: ORTHOPAEDIC SURGERY

## 2023-03-01 PROCEDURE — 3700000001 HC ADD 15 MINUTES (ANESTHESIA): Performed by: ORTHOPAEDIC SURGERY

## 2023-03-01 PROCEDURE — 6360000002 HC RX W HCPCS: Performed by: STUDENT IN AN ORGANIZED HEALTH CARE EDUCATION/TRAINING PROGRAM

## 2023-03-01 PROCEDURE — 86900 BLOOD TYPING SEROLOGIC ABO: CPT

## 2023-03-01 PROCEDURE — 27130 TOTAL HIP ARTHROPLASTY: CPT | Performed by: ORTHOPAEDIC SURGERY

## 2023-03-01 PROCEDURE — 2709999900 HC NON-CHARGEABLE SUPPLY: Performed by: ORTHOPAEDIC SURGERY

## 2023-03-01 PROCEDURE — 2580000003 HC RX 258: Performed by: ANESTHESIOLOGY

## 2023-03-01 PROCEDURE — 27130 TOTAL HIP ARTHROPLASTY: CPT | Performed by: PHYSICIAN ASSISTANT

## 2023-03-01 PROCEDURE — 3700000000 HC ANESTHESIA ATTENDED CARE: Performed by: ORTHOPAEDIC SURGERY

## 2023-03-01 PROCEDURE — 2720000010 HC SURG SUPPLY STERILE: Performed by: ORTHOPAEDIC SURGERY

## 2023-03-01 PROCEDURE — 87641 MR-STAPH DNA AMP PROBE: CPT

## 2023-03-01 PROCEDURE — 3600000005 HC SURGERY LEVEL 5 BASE: Performed by: ORTHOPAEDIC SURGERY

## 2023-03-01 PROCEDURE — 73501 X-RAY EXAM HIP UNI 1 VIEW: CPT

## 2023-03-01 PROCEDURE — 2500000003 HC RX 250 WO HCPCS

## 2023-03-01 PROCEDURE — 6370000000 HC RX 637 (ALT 250 FOR IP): Performed by: ORTHOPAEDIC SURGERY

## 2023-03-01 PROCEDURE — 86850 RBC ANTIBODY SCREEN: CPT

## 2023-03-01 PROCEDURE — 7100000001 HC PACU RECOVERY - ADDTL 15 MIN: Performed by: ORTHOPAEDIC SURGERY

## 2023-03-01 PROCEDURE — 6360000002 HC RX W HCPCS

## 2023-03-01 PROCEDURE — A4217 STERILE WATER/SALINE, 500 ML: HCPCS | Performed by: ORTHOPAEDIC SURGERY

## 2023-03-01 PROCEDURE — 86901 BLOOD TYPING SEROLOGIC RH(D): CPT

## 2023-03-01 PROCEDURE — A4216 STERILE WATER/SALINE, 10 ML: HCPCS | Performed by: ORTHOPAEDIC SURGERY

## 2023-03-01 DEVICE — ACTIS DUOFIX HIP PROSTHESIS (FEMORAL STEM 12/14 TAPER CEMENTLESS SIZE 6 HIGH COLLAR)  CE
Type: IMPLANTABLE DEVICE | Site: HIP | Status: FUNCTIONAL
Brand: ACTIS

## 2023-03-01 DEVICE — BIOLOX DELTA TS CERAMIC FEMORAL HEAD 12/14 TAPER REVISION DIAMETER 40MM +12
Type: IMPLANTABLE DEVICE | Site: HIP | Status: FUNCTIONAL
Brand: BIOLOX DELTA

## 2023-03-01 DEVICE — PINNACLE GRIPTION ACETABULAR SHELL SECTOR 58MM OD
Type: IMPLANTABLE DEVICE | Site: HIP | Status: FUNCTIONAL
Brand: PINNACLE GRIPTION

## 2023-03-01 DEVICE — PINNACLE HIP SOLUTIONS ALTRX LD POLYETHYLENE ACETABULAR LINER +4 NEUTRAL 40MM ID 58MM OD
Type: IMPLANTABLE DEVICE | Site: HIP | Status: FUNCTIONAL
Brand: PINNACLE ALTRX

## 2023-03-01 RX ORDER — SODIUM CHLORIDE 0.9 % (FLUSH) 0.9 %
5-40 SYRINGE (ML) INJECTION PRN
Status: DISCONTINUED | OUTPATIENT
Start: 2023-03-01 | End: 2023-03-01 | Stop reason: HOSPADM

## 2023-03-01 RX ORDER — MORPHINE SULFATE 4 MG/ML
4 INJECTION, SOLUTION INTRAMUSCULAR; INTRAVENOUS
Status: DISCONTINUED | OUTPATIENT
Start: 2023-03-01 | End: 2023-03-02 | Stop reason: HOSPADM

## 2023-03-01 RX ORDER — TRANEXAMIC ACID 650 MG/1
1950 TABLET ORAL ONCE
Status: COMPLETED | OUTPATIENT
Start: 2023-03-01 | End: 2023-03-01

## 2023-03-01 RX ORDER — PROPOFOL 10 MG/ML
INJECTION, EMULSION INTRAVENOUS PRN
Status: DISCONTINUED | OUTPATIENT
Start: 2023-03-01 | End: 2023-03-01 | Stop reason: SDUPTHER

## 2023-03-01 RX ORDER — KETOROLAC TROMETHAMINE 15 MG/ML
15 INJECTION, SOLUTION INTRAMUSCULAR; INTRAVENOUS
Status: DISCONTINUED | OUTPATIENT
Start: 2023-03-01 | End: 2023-03-02 | Stop reason: HOSPADM

## 2023-03-01 RX ORDER — MELOXICAM 7.5 MG/1
7.5 TABLET ORAL ONCE
Status: COMPLETED | OUTPATIENT
Start: 2023-03-01 | End: 2023-03-01

## 2023-03-01 RX ORDER — SODIUM CHLORIDE 9 MG/ML
INJECTION, SOLUTION INTRAVENOUS PRN
Status: DISCONTINUED | OUTPATIENT
Start: 2023-03-01 | End: 2023-03-01 | Stop reason: HOSPADM

## 2023-03-01 RX ORDER — OXYCODONE HYDROCHLORIDE 10 MG/1
10 TABLET ORAL EVERY 4 HOURS PRN
Status: DISCONTINUED | OUTPATIENT
Start: 2023-03-01 | End: 2023-03-02 | Stop reason: HOSPADM

## 2023-03-01 RX ORDER — SODIUM CHLORIDE 0.9 % (FLUSH) 0.9 %
5-40 SYRINGE (ML) INJECTION EVERY 12 HOURS SCHEDULED
Status: DISCONTINUED | OUTPATIENT
Start: 2023-03-01 | End: 2023-03-01 | Stop reason: HOSPADM

## 2023-03-01 RX ORDER — CEFAZOLIN SODIUM 1 G/3ML
3000 INJECTION, POWDER, FOR SOLUTION INTRAMUSCULAR; INTRAVENOUS ONCE
Status: DISCONTINUED | OUTPATIENT
Start: 2023-03-01 | End: 2023-03-01

## 2023-03-01 RX ORDER — MAGNESIUM HYDROXIDE 1200 MG/15ML
LIQUID ORAL CONTINUOUS PRN
Status: DISCONTINUED | OUTPATIENT
Start: 2023-03-01 | End: 2023-03-01 | Stop reason: HOSPADM

## 2023-03-01 RX ORDER — FENTANYL CITRATE 50 UG/ML
INJECTION, SOLUTION INTRAMUSCULAR; INTRAVENOUS PRN
Status: DISCONTINUED | OUTPATIENT
Start: 2023-03-01 | End: 2023-03-01 | Stop reason: SDUPTHER

## 2023-03-01 RX ORDER — PROCHLORPERAZINE EDISYLATE 5 MG/ML
5 INJECTION INTRAMUSCULAR; INTRAVENOUS
Status: DISCONTINUED | OUTPATIENT
Start: 2023-03-01 | End: 2023-03-01 | Stop reason: HOSPADM

## 2023-03-01 RX ORDER — INSULIN LISPRO 100 [IU]/ML
0-4 INJECTION, SOLUTION INTRAVENOUS; SUBCUTANEOUS NIGHTLY
Status: DISCONTINUED | OUTPATIENT
Start: 2023-03-01 | End: 2023-03-02

## 2023-03-01 RX ORDER — LIDOCAINE HYDROCHLORIDE 20 MG/ML
INJECTION, SOLUTION EPIDURAL; INFILTRATION; INTRACAUDAL; PERINEURAL PRN
Status: DISCONTINUED | OUTPATIENT
Start: 2023-03-01 | End: 2023-03-01 | Stop reason: SDUPTHER

## 2023-03-01 RX ORDER — ASPIRIN 81 MG/1
81 TABLET ORAL 2 TIMES DAILY
Qty: 60 TABLET | Refills: 0 | Status: SHIPPED | OUTPATIENT
Start: 2023-03-01 | End: 2023-03-02 | Stop reason: HOSPADM

## 2023-03-01 RX ORDER — SODIUM CHLORIDE 0.9 % (FLUSH) 0.9 %
5-40 SYRINGE (ML) INJECTION EVERY 12 HOURS SCHEDULED
Status: DISCONTINUED | OUTPATIENT
Start: 2023-03-01 | End: 2023-03-02 | Stop reason: HOSPADM

## 2023-03-01 RX ORDER — DEXTROSE MONOHYDRATE 100 MG/ML
INJECTION, SOLUTION INTRAVENOUS CONTINUOUS PRN
Status: DISCONTINUED | OUTPATIENT
Start: 2023-03-01 | End: 2023-03-02

## 2023-03-01 RX ORDER — SODIUM CHLORIDE 9 MG/ML
INJECTION, SOLUTION INTRAVENOUS PRN
Status: DISCONTINUED | OUTPATIENT
Start: 2023-03-01 | End: 2023-03-02 | Stop reason: HOSPADM

## 2023-03-01 RX ORDER — ACETAMINOPHEN 325 MG/1
650 TABLET ORAL EVERY 6 HOURS
Status: DISCONTINUED | OUTPATIENT
Start: 2023-03-01 | End: 2023-03-02 | Stop reason: HOSPADM

## 2023-03-01 RX ORDER — PREGABALIN 75 MG/1
75 CAPSULE ORAL 2 TIMES DAILY
Qty: 28 CAPSULE | Refills: 0 | Status: SHIPPED | OUTPATIENT
Start: 2023-03-01 | End: 2023-03-15

## 2023-03-01 RX ORDER — OXYCODONE HYDROCHLORIDE 10 MG/1
10 TABLET ORAL ONCE
Status: COMPLETED | OUTPATIENT
Start: 2023-03-01 | End: 2023-03-01

## 2023-03-01 RX ORDER — ONDANSETRON 2 MG/ML
INJECTION INTRAMUSCULAR; INTRAVENOUS PRN
Status: DISCONTINUED | OUTPATIENT
Start: 2023-03-01 | End: 2023-03-01 | Stop reason: SDUPTHER

## 2023-03-01 RX ORDER — MEPERIDINE HYDROCHLORIDE 25 MG/ML
12.5 INJECTION INTRAMUSCULAR; INTRAVENOUS; SUBCUTANEOUS EVERY 5 MIN PRN
Status: DISCONTINUED | OUTPATIENT
Start: 2023-03-01 | End: 2023-03-01 | Stop reason: HOSPADM

## 2023-03-01 RX ORDER — OXYCODONE HYDROCHLORIDE 5 MG/1
5-10 TABLET ORAL EVERY 6 HOURS PRN
Qty: 40 TABLET | Refills: 0 | Status: SHIPPED | OUTPATIENT
Start: 2023-03-01 | End: 2023-03-06

## 2023-03-01 RX ORDER — MIDAZOLAM HYDROCHLORIDE 1 MG/ML
INJECTION INTRAMUSCULAR; INTRAVENOUS PRN
Status: DISCONTINUED | OUTPATIENT
Start: 2023-03-01 | End: 2023-03-01 | Stop reason: SDUPTHER

## 2023-03-01 RX ORDER — MELOXICAM 7.5 MG/1
7.5 TABLET ORAL DAILY
Status: CANCELLED | OUTPATIENT
Start: 2023-03-02

## 2023-03-01 RX ORDER — ALLOPURINOL 300 MG/1
300 TABLET ORAL DAILY
Status: DISCONTINUED | OUTPATIENT
Start: 2023-03-02 | End: 2023-03-02 | Stop reason: HOSPADM

## 2023-03-01 RX ORDER — PREGABALIN 75 MG/1
75 CAPSULE ORAL 2 TIMES DAILY
Status: DISCONTINUED | OUTPATIENT
Start: 2023-03-01 | End: 2023-03-02 | Stop reason: HOSPADM

## 2023-03-01 RX ORDER — OXYCODONE HYDROCHLORIDE 5 MG/1
5 TABLET ORAL EVERY 4 HOURS PRN
Status: DISCONTINUED | OUTPATIENT
Start: 2023-03-01 | End: 2023-03-02 | Stop reason: HOSPADM

## 2023-03-01 RX ORDER — LABETALOL HYDROCHLORIDE 5 MG/ML
10 INJECTION, SOLUTION INTRAVENOUS
Status: DISCONTINUED | OUTPATIENT
Start: 2023-03-01 | End: 2023-03-01 | Stop reason: HOSPADM

## 2023-03-01 RX ORDER — SODIUM CHLORIDE 0.9 % (FLUSH) 0.9 %
5-40 SYRINGE (ML) INJECTION PRN
Status: DISCONTINUED | OUTPATIENT
Start: 2023-03-01 | End: 2023-03-02 | Stop reason: HOSPADM

## 2023-03-01 RX ORDER — ONDANSETRON 2 MG/ML
4 INJECTION INTRAMUSCULAR; INTRAVENOUS
Status: DISCONTINUED | OUTPATIENT
Start: 2023-03-01 | End: 2023-03-01 | Stop reason: HOSPADM

## 2023-03-01 RX ORDER — IPRATROPIUM BROMIDE AND ALBUTEROL SULFATE 2.5; .5 MG/3ML; MG/3ML
1 SOLUTION RESPIRATORY (INHALATION)
Status: DISCONTINUED | OUTPATIENT
Start: 2023-03-01 | End: 2023-03-01 | Stop reason: HOSPADM

## 2023-03-01 RX ORDER — DEXAMETHASONE SODIUM PHOSPHATE 4 MG/ML
INJECTION, SOLUTION INTRA-ARTICULAR; INTRALESIONAL; INTRAMUSCULAR; INTRAVENOUS; SOFT TISSUE PRN
Status: DISCONTINUED | OUTPATIENT
Start: 2023-03-01 | End: 2023-03-01 | Stop reason: SDUPTHER

## 2023-03-01 RX ORDER — ONDANSETRON 4 MG/1
4 TABLET, ORALLY DISINTEGRATING ORAL EVERY 8 HOURS PRN
Status: DISCONTINUED | OUTPATIENT
Start: 2023-03-01 | End: 2023-03-02 | Stop reason: HOSPADM

## 2023-03-01 RX ORDER — SODIUM CHLORIDE 450 MG/100ML
INJECTION, SOLUTION INTRAVENOUS CONTINUOUS
Status: DISCONTINUED | OUTPATIENT
Start: 2023-03-01 | End: 2023-03-02

## 2023-03-01 RX ORDER — INSULIN LISPRO 100 [IU]/ML
0-4 INJECTION, SOLUTION INTRAVENOUS; SUBCUTANEOUS
Status: DISCONTINUED | OUTPATIENT
Start: 2023-03-01 | End: 2023-03-02

## 2023-03-01 RX ORDER — ROCURONIUM BROMIDE 10 MG/ML
INJECTION, SOLUTION INTRAVENOUS PRN
Status: DISCONTINUED | OUTPATIENT
Start: 2023-03-01 | End: 2023-03-01 | Stop reason: SDUPTHER

## 2023-03-01 RX ORDER — CALCIUM CARBONATE 200(500)MG
500 TABLET,CHEWABLE ORAL 3 TIMES DAILY PRN
Status: DISCONTINUED | OUTPATIENT
Start: 2023-03-01 | End: 2023-03-02 | Stop reason: HOSPADM

## 2023-03-01 RX ORDER — PHENYLEPHRINE HCL IN 0.9% NACL 1 MG/10 ML
SYRINGE (ML) INTRAVENOUS PRN
Status: DISCONTINUED | OUTPATIENT
Start: 2023-03-01 | End: 2023-03-01 | Stop reason: SDUPTHER

## 2023-03-01 RX ORDER — MORPHINE SULFATE 2 MG/ML
2 INJECTION, SOLUTION INTRAMUSCULAR; INTRAVENOUS
Status: DISCONTINUED | OUTPATIENT
Start: 2023-03-01 | End: 2023-03-02 | Stop reason: HOSPADM

## 2023-03-01 RX ORDER — ONDANSETRON 2 MG/ML
4 INJECTION INTRAMUSCULAR; INTRAVENOUS EVERY 6 HOURS PRN
Status: DISCONTINUED | OUTPATIENT
Start: 2023-03-01 | End: 2023-03-02 | Stop reason: HOSPADM

## 2023-03-01 RX ORDER — HYDRALAZINE HYDROCHLORIDE 20 MG/ML
10 INJECTION INTRAMUSCULAR; INTRAVENOUS
Status: DISCONTINUED | OUTPATIENT
Start: 2023-03-01 | End: 2023-03-01 | Stop reason: HOSPADM

## 2023-03-01 RX ORDER — INSULIN LISPRO 100 [IU]/ML
0.08 INJECTION, SOLUTION INTRAVENOUS; SUBCUTANEOUS
Status: DISCONTINUED | OUTPATIENT
Start: 2023-03-01 | End: 2023-03-01

## 2023-03-01 RX ORDER — INSULIN GLARGINE 100 [IU]/ML
0.25 INJECTION, SOLUTION SUBCUTANEOUS NIGHTLY
Status: DISCONTINUED | OUTPATIENT
Start: 2023-03-01 | End: 2023-03-01

## 2023-03-01 RX ORDER — SUCCINYLCHOLINE/SOD CL,ISO/PF 200MG/10ML
SYRINGE (ML) INTRAVENOUS PRN
Status: DISCONTINUED | OUTPATIENT
Start: 2023-03-01 | End: 2023-03-01 | Stop reason: SDUPTHER

## 2023-03-01 RX ADMIN — HYDROMORPHONE HYDROCHLORIDE 0.25 MG: 1 INJECTION, SOLUTION INTRAMUSCULAR; INTRAVENOUS; SUBCUTANEOUS at 14:32

## 2023-03-01 RX ADMIN — OXYCODONE HYDROCHLORIDE 10 MG: 10 TABLET ORAL at 12:35

## 2023-03-01 RX ADMIN — ONDANSETRON 4 MG: 2 INJECTION INTRAMUSCULAR; INTRAVENOUS at 13:25

## 2023-03-01 RX ADMIN — SODIUM CHLORIDE: 9 INJECTION, SOLUTION INTRAVENOUS at 12:12

## 2023-03-01 RX ADMIN — FENTANYL CITRATE 50 MCG: 50 INJECTION INTRAMUSCULAR; INTRAVENOUS at 13:24

## 2023-03-01 RX ADMIN — Medication 100 MCG: at 13:23

## 2023-03-01 RX ADMIN — ROCURONIUM BROMIDE 10 MG: 10 INJECTION, SOLUTION INTRAVENOUS at 13:08

## 2023-03-01 RX ADMIN — VANCOMYCIN HYDROCHLORIDE 2000 MG: 1 INJECTION, POWDER, LYOPHILIZED, FOR SOLUTION INTRAVENOUS at 12:40

## 2023-03-01 RX ADMIN — ACETAMINOPHEN 650 MG: 325 TABLET ORAL at 17:58

## 2023-03-01 RX ADMIN — Medication 140 MG: at 13:08

## 2023-03-01 RX ADMIN — HYDROMORPHONE HYDROCHLORIDE 0.5 MG: 1 INJECTION, SOLUTION INTRAMUSCULAR; INTRAVENOUS; SUBCUTANEOUS at 13:41

## 2023-03-01 RX ADMIN — Medication 10 ML: at 21:20

## 2023-03-01 RX ADMIN — MELOXICAM 7.5 MG: 7.5 TABLET ORAL at 12:35

## 2023-03-01 RX ADMIN — LIDOCAINE HYDROCHLORIDE 100 MG: 20 INJECTION, SOLUTION EPIDURAL; INFILTRATION; INTRACAUDAL; PERINEURAL at 13:08

## 2023-03-01 RX ADMIN — HYDROMORPHONE HYDROCHLORIDE 0.5 MG: 1 INJECTION, SOLUTION INTRAMUSCULAR; INTRAVENOUS; SUBCUTANEOUS at 15:02

## 2023-03-01 RX ADMIN — Medication 100 MCG: at 14:18

## 2023-03-01 RX ADMIN — Medication 100 MCG: at 14:22

## 2023-03-01 RX ADMIN — SODIUM CHLORIDE: 4.5 INJECTION, SOLUTION INTRAVENOUS at 17:24

## 2023-03-01 RX ADMIN — PREGABALIN 75 MG: 75 CAPSULE ORAL at 21:16

## 2023-03-01 RX ADMIN — APIXABAN 2.5 MG: 2.5 TABLET, FILM COATED ORAL at 21:16

## 2023-03-01 RX ADMIN — MIDAZOLAM 2 MG: 1 INJECTION INTRAMUSCULAR; INTRAVENOUS at 13:04

## 2023-03-01 RX ADMIN — CEFAZOLIN 3000 MG: 10 INJECTION, POWDER, FOR SOLUTION INTRAVENOUS at 21:18

## 2023-03-01 RX ADMIN — Medication 100 MCG: at 13:49

## 2023-03-01 RX ADMIN — ACETAMINOPHEN 650 MG: 325 TABLET ORAL at 21:15

## 2023-03-01 RX ADMIN — DEXAMETHASONE SODIUM PHOSPHATE 8 MG: 4 INJECTION, SOLUTION INTRAMUSCULAR; INTRAVENOUS at 13:25

## 2023-03-01 RX ADMIN — PROPOFOL 220 MG: 10 INJECTION, EMULSION INTRAVENOUS at 13:08

## 2023-03-01 RX ADMIN — Medication 3000 MG: at 13:06

## 2023-03-01 RX ADMIN — ROCURONIUM BROMIDE 40 MG: 10 INJECTION, SOLUTION INTRAVENOUS at 13:14

## 2023-03-01 RX ADMIN — Medication 100 MCG: at 13:21

## 2023-03-01 RX ADMIN — TRANEXAMIC ACID 1950 MG: 650 TABLET ORAL at 12:35

## 2023-03-01 RX ADMIN — FENTANYL CITRATE 50 MCG: 50 INJECTION INTRAMUSCULAR; INTRAVENOUS at 13:08

## 2023-03-01 ASSESSMENT — PAIN SCALES - GENERAL
PAINLEVEL_OUTOF10: 0
PAINLEVEL_OUTOF10: 7
PAINLEVEL_OUTOF10: 3
PAINLEVEL_OUTOF10: 0
PAINLEVEL_OUTOF10: 3

## 2023-03-01 ASSESSMENT — PAIN DESCRIPTION - LOCATION
LOCATION: HIP
LOCATION: HIP

## 2023-03-01 ASSESSMENT — PAIN DESCRIPTION - ORIENTATION
ORIENTATION: LEFT
ORIENTATION: LEFT

## 2023-03-01 ASSESSMENT — PAIN DESCRIPTION - PAIN TYPE: TYPE: SURGICAL PAIN

## 2023-03-01 ASSESSMENT — PAIN DESCRIPTION - FREQUENCY: FREQUENCY: CONTINUOUS

## 2023-03-01 ASSESSMENT — PAIN SCALES - WONG BAKER
WONGBAKER_NUMERICALRESPONSE: 0
WONGBAKER_NUMERICALRESPONSE: 2

## 2023-03-01 ASSESSMENT — PAIN - FUNCTIONAL ASSESSMENT
PAIN_FUNCTIONAL_ASSESSMENT: 0-10
PAIN_FUNCTIONAL_ASSESSMENT: ACTIVITIES ARE NOT PREVENTED
PAIN_FUNCTIONAL_ASSESSMENT: ACTIVITIES ARE NOT PREVENTED

## 2023-03-01 ASSESSMENT — LIFESTYLE VARIABLES: SMOKING_STATUS: 0

## 2023-03-01 ASSESSMENT — PAIN DESCRIPTION - DESCRIPTORS
DESCRIPTORS: DISCOMFORT
DESCRIPTORS: OTHER (COMMENT)

## 2023-03-01 ASSESSMENT — PAIN DESCRIPTION - ONSET: ONSET: ON-GOING

## 2023-03-01 NOTE — ANESTHESIA PRE PROCEDURE
Department of Anesthesiology  Preprocedure Note       Name:  Ed Old   Age:  61 y.o.  :  1965                                          MRN:  [id number]         Date:  3/1/2023      Surgeon: Perla Garcia):  June Hope MD    Procedure: Procedure(s):  LEFT ANTERIOR TOTAL HIP REPLACEMENT WITH C-ARM    Medications prior to admission:   Prior to Admission medications    Medication Sig Start Date End Date Taking? Authorizing Provider   amoxicillin (AMOXIL) 500 MG capsule Take 500 mg by mouth 3 times daily 23  Yes Historical Provider, MD   allopurinol (ZYLOPRIM) 300 MG tablet TAKE 1 TABLET BY MOUTH DAILY 7/6/21 3/1/23  Ashia Barr MD   Blood Pressure KIT Dispense one bp kit 21   Alla Alexander MD   Vitamin D (CHOLECALCIFEROL) 50 MCG ( UT) TABS tablet Take 1 tablet by mouth daily Therapeutic Dose.  20   Marcella Garcia MD       Current medications:    Current Facility-Administered Medications   Medication Dose Route Frequency Provider Last Rate Last Admin    meloxicam (MOBIC) tablet 7.5 mg  7.5 mg Oral Once June Hope MD        oxyCODONE HCl (OXY-IR) immediate release tablet 10 mg  10 mg Oral Once June Hope MD        tranexamic acid (LYSTEDA) tablet 1,950 mg  1,950 mg Oral Once June Hope MD        vancomycin [facility]) 2,000 mg in sodium chloride 0.9 % 500 mL IVPB  2,000 mg IntraVENous On Call to Maik Tuttle MD        ceFAZolin (ANCEF) injection 3,000 mg  3,000 mg IntraVENous Once June Hope MD        sodium chloride flush 0.9 % injection 5-40 mL  5-40 mL IntraVENous 2 times per day Alber Izaguirre MD        sodium chloride flush 0.9 % injection 5-40 mL  5-40 mL IntraVENous PRN Alber Izaguirre MD        0.9 % sodium chloride infusion   IntraVENous PRN Alber Izaguirre  mL/hr at 23 1212 New Bag at 23 1212       Allergies:  No Known Allergies    Problem List:    Patient Active Problem List   Diagnosis Code    Staph skin infection L08.9, B95.8    Microalbuminuria R80.9    Hyperglycemia R73.9    Polyuria R35.89    Failure of erection N52.9    Chronic kidney disease, stage II (mild) N18.2    Benign paroxysmal positional nystagmus H81.10    Erectile dysfunction N52.9    Vitamin D deficiency E55.9    Allergic rhinitis J30.9    Benign paroxysmal positional vertigo H81.10    Morbid obesity with BMI of 50.0-59.9, adult (HCC) E66.01, Z68.43    HLD (hyperlipidemia) E78.5    Obstructive sleep apnea syndrome G47.33    Panic attack F41.0    Controlled type 2 diabetes mellitus without complication, without long-term current use of insulin (HCC) E11.9    Primary osteoarthritis of both knees M17.0    Complex tear of lateral meniscus of right knee as current injury S83.271A    Complex tear of medial meniscus of right knee as current injury S83.231A    Degenerative disc disease, cervical M50.30    Idiopathic chronic gout of multiple sites without tophus M1A. 09X0    Chronic GERD K21.9    Hemorrhoid K64.9    Numbness R20.0    Hand pain, right M79.641    Arthritis of carpometacarpal (CMC) joint of right thumb M18.11    Enlarged lymph node R59.9    Groin pain, left R10.32    Pneumonia due to COVID-19 virus U07.1, J12.82       Past Medical History:        Diagnosis Date    Arthritis     Chicken pox     Chronic GERD 07/19/2018    Congestion of upper airway     COVID-19 virus infection 11/22/2020    Dizziness     CORTES (generalized anxiety disorder)     Gout     Hip pain 02/2023    and groin    Hyperlipidemia     Obstructive sleep apnea syndrome 08/08/2016    doesn't use CPAP    Panic attacks     Pneumonia due to COVID-19 virus 12/04/2020    Rash     Vitamin D deficiency 08/03/2015       Past Surgical History:        Procedure Laterality Date    COLONOSCOPY  2017    Per pt' was nml.     HAND SURGERY      KNEE ARTHROSCOPY Right 09/14/2017    RIGHT KNEE ARTHROSCOPY WITH PARTIAL MEDIAL AND LATERAL MENISCECTOMY,CHONDROPLASTY,SYNOVECTOMY    KNEE SURGERY      left meniscus    MENISCECTOMY Left     TONSILLECTOMY      UPPER GASTROINTESTINAL ENDOSCOPY N/A 9/10/2018    EGD BIOPSY performed by Paddy Resendez, DO at [address] [facility] [address] EXTRACTION         Social History:    Social History     Tobacco Use    Smoking status: Never    Smokeless tobacco: Never   Substance Use Topics    Alcohol use: Not Currently     Comment: rarely                                Counseling given: Not Answered      Vital Signs (Current):   Vitals:    02/21/23 1502 03/01/23 1200   BP:  (!) 144/94   Pulse:  87   Resp:  16   Temp:  97.7 °F (36.5 °C)   TempSrc:  Temporal   SpO2:  97%   Weight: 280 lb (127 kg) 280 lb (127 kg)   Height: 5' 8\" (1.727 m) 5' 8\" (1.727 m)                                              BP Readings from Last 3 Encounters:   03/01/23 (!) 144/94   11/01/22 (!) 146/104   05/03/22 133/88       NPO Status: Time of last liquid consumption: 2230                        Time of last solid consumption: 2200                        Date of last liquid consumption: 02/28/23                        Date of last solid food consumption: 02/28/23    BMI:   Wt Readings from Last 3 Encounters:   03/01/23 280 lb (127 kg)   02/23/23 280 lb (127 kg)   11/17/22 (!) 305 lb (138.3 kg)     Body mass index is 42.57 kg/m².     CBC:   Lab Results   Component Value Date/Time    WBC 3.7 02/17/2023 01:21 PM    RBC 4.88 02/17/2023 01:21 PM    HGB 14.3 02/17/2023 01:21 PM    HCT 42.8 02/17/2023 01:21 PM    MCV 87.8 02/17/2023 01:21 PM    RDW 14.5 02/17/2023 01:21 PM     02/17/2023 01:21 PM       CMP:   Lab Results   Component Value Date/Time     02/17/2023 01:21 PM    K 4.5 02/17/2023 01:21 PM    K 4.6 12/05/2020 03:30 AM     02/17/2023 01:21 PM    CO2 26 02/17/2023 01:21 PM    BUN 8 02/17/2023 01:21 PM    CREATININE 1.0 02/17/2023 01:21 PM    GFRAA >60 12/05/2020 03:30 AM    GFRAA >60 03/06/2011 03:10 PM    AGRATIO 0.9 12/04/2020 04:37 PM    LABGLOM >60 02/17/2023 01:21 PM    GLUCOSE 88 02/17/2023 01:21 PM    PROT 8.9 12/04/2020 04:37 PM    PROT 7.7 08/09/2012 09:31 AM    CALCIUM 9.9 02/17/2023 01:21 PM    BILITOT 0.6 12/04/2020 04:37 PM    ALKPHOS 72 12/04/2020 04:37 PM    AST 41 12/04/2020 04:37 PM    ALT 43 12/04/2020 04:37 PM       POC Tests: No results for input(s): POCGLU, POCNA, POCK, POCCL, POCBUN, POCHEMO, POCHCT in the last 72 hours. Coags:   Lab Results   Component Value Date/Time    PROTIME 12.8 02/17/2023 01:21 PM    INR 0.97 02/17/2023 01:21 PM    APTT 33.3 02/17/2023 01:21 PM       HCG (If Applicable): No results found for: PREGTESTUR, PREGSERUM, HCG, HCGQUANT     ABGs: No results found for: PHART, PO2ART, OED6LIK, XOF2FVW, BEART, R4FXXXCN     Type & Screen (If Applicable):  No results found for: LABABO, LABRH    Drug/Infectious Status (If Applicable):  No results found for: HIV, HEPCAB    COVID-19 Screening (If Applicable):   Lab Results   Component Value Date/Time    COVID19 DETECTED 11/22/2020 10:22 PM           Anesthesia Evaluation   no history of anesthetic complications:   Airway: Mallampati: II  TM distance: >3 FB     Comment: Increased neck circumference  Mouth opening: > = 3 FB   Dental: normal exam     Comment: Denies loose teeth    Pulmonary:   (+) sleep apnea: on noncompliant,      (-) rhonchi, wheezes, rales and not a current smoker                           Cardiovascular:  Exercise tolerance: good (>4 METS), Arrived to preop in wheelchair  (+) CAD: non-obstructive,     (-) orthopnea,  HENAO and weak pulses      Rhythm: regular  Rate: normal                 ROS comment: EKG: Sinus rhythm    Echocardiogram:  Summary:  Left ventricular cavity size is normal. There is mild left ventricular hypertrophy. Overall left ventricular systolic function appears low normal with an ejection fraction of 50-55%. No regional wall motion abnormalities are noted. Normal diastolic function. Trace tricuspid regurgitation. No prior echo for comparison. Neuro/Psych:   (+) psychiatric history (History of panic attack):depression/anxiety    (-) seizures, TIA and CVA            ROS comment: Chronic bilateral hip pain GI/Hepatic/Renal:   (+) GERD:, renal disease: CRI, morbid obesity (BMI: 42.6, s/p intentional weight loss, BMI>50 in past)     (-) liver disease       Endo/Other:    (+) Diabetes (HgbA1c: 5.6-5.8%, denies diabetes diagnosis)Type II DM, , .    (-) blood dyscrasia               Abdominal:   (+) obese (protuberant),           Vascular:   + PVD, aortic or cerebral (Mild carotid stenosis), . Other Findings:           Anesthesia Plan      general     ASA 3     (Poor candidate for neuraxial 2/2 habitus and likely difficulty with positioning. Plan GETA. NPO appropriate. Mr. Daren Hamilton denies active nausea / reflux.)  Induction: intravenous. MIPS: Postoperative opioids intended and Prophylactic antiemetics administered. Anesthetic plan and risks discussed with patient. Plan discussed with CRNA. This pre-anesthesia assessment may be used as a history and physical.    DOS STAFF ADDENDUM:    Pt seen and examined, chart reviewed (including anesthesia, drug and allergy history). No interval changes to history and physical examination. Anesthetic plan, risks, benefits, alternatives, and personnel involved discussed with patient. Patient verbalized an understanding and agrees to proceed.       Jayda Tian MD  March 1, 2023  12:19 PM

## 2023-03-01 NOTE — PROGRESS NOTES
[id number] [facility] Orthopedic Surgery   Progress Note      S/P :  SUBJECTIVE  in PACU Opens eyes to name. Does not appear in distress. OBJECTIVE              Physical                      VITALS:  /73   Pulse 81   Temp 97.3 °F (36.3 °C) (Temporal)   Resp 16   Ht 5' 8\" (1.727 m)   Wt 280 lb (127 kg)   SpO2 99%   BMI 42.57 kg/m²                     MUSCULOSKELETAL:  left foot NVI. Wiggles toes to tickling  Pedal pulses are palpable. Surgical wound appears clean and dry left hip with gauze and tape dressing. Ice pack on. CHELSEY hose on.      Data       CBC:   Lab Results   Component Value Date/Time    WBC 3.7 02/17/2023 01:21 PM    RBC 4.88 02/17/2023 01:21 PM    HGB 14.3 02/17/2023 01:21 PM    HCT 42.8 02/17/2023 01:21 PM    MCV 87.8 02/17/2023 01:21 PM    MCH 29.3 02/17/2023 01:21 PM    MCHC 33.4 02/17/2023 01:21 PM    RDW 14.5 02/17/2023 01:21 PM     02/17/2023 01:21 PM    MPV 8.9 02/17/2023 01:21 PM        WBC:    Lab Results   Component Value Date/Time    WBC 3.7 02/17/2023 01:21 PM        Hemoglobin/Hematocrit:    Lab Results   Component Value Date/Time    HGB 14.3 02/17/2023 01:21 PM    HCT 42.8 02/17/2023 01:21 PM        PT/INR:    Lab Results   Component Value Date/Time    PROTIME 12.8 02/17/2023 01:21 PM    INR 0.97 02/17/2023 01:21 PM              Current Inpatient Medications             Current Facility-Administered Medications: sodium chloride flush 0.9 % injection 5-40 mL, 5-40 mL, IntraVENous, 2 times per day  sodium chloride flush 0.9 % injection 5-40 mL, 5-40 mL, IntraVENous, PRN  0.9 % sodium chloride infusion, , IntraVENous, PRN  HYDROmorphone (DILAUDID) injection 0.25 mg, 0.25 mg, IntraVENous, Q5 Min PRN  HYDROmorphone (DILAUDID) injection 0.5 mg, 0.5 mg, IntraVENous, Q5 Min PRN  ondansetron (ZOFRAN) injection 4 mg, 4 mg, IntraVENous, Once PRN  prochlorperazine (COMPAZINE) injection 5 mg, 5 mg, IntraVENous, Once PRN  labetalol (NORMODYNE;TRANDATE) injection 10 mg, 10 mg, IntraVENous, Q15 Min PRN **OR** hydrALAZINE (APRESOLINE) injection 10 mg, 10 mg, IntraVENous, Q15 Min PRN  meperidine (DEMEROL) injection 12.5 mg, 12.5 mg, IntraVENous, Q5 Min PRN  ipratropium-albuterol (DUONEB) nebulizer solution 1 ampule, 1 ampule, Inhalation, Once PRN    ASSESSMENT AND PLAN    Post left MEHDI, stable exam  DVT prophylaxis ordered, Eliquis bid for 14 total days after discharge from hospital for DVT prophylaxis   PT OT for ADL's and ambulation as tolerated  SS for DC planning, home with home care tomorrow  IV or PO pain med as ordered , Lyrica  added for postop pain control in an effort to reduce narcotic use per Dr Christiano Murphy protocol.         Renetta Stack, APRN - CNP  3/1/2023  2:49 PM

## 2023-03-01 NOTE — OP NOTE
Patient: Augie Murray  YOB: 1965  MRN: [id number]    Date of Procedure: 3/1/2023      Pre-Op Diagnosis: Osteoarthritis, left hip     Post-Op Diagnosis: Same       Procedure Performed: Left anterior total hip arthroplasty     Surgeon: Malaika Wall MD     Physician Assistant: JENNIFER Jacobs     Anesthesia: General     Estimated Blood Loss: 953 mL      Complications: None    Implants:  Depuy Marion Gription cup, 58 mm  40 mm polyethylene liner, +4 mm offset  Depuy Actis stem, size 6 high offset  40 mm diameter ceramic femoral head, +12 mm offset    Indications: This is a 61 y.o. male with osteoarthritis of the hip that continues to be painful despite conservative management. We discussed the diagnosis and treatment options and I recommended total hip arthroplasty. The operative procedure, alternatives, and risks were discussed in detail with the patient. The risks include but are not limited to: Infection, vessel injury, nerve injury, DVT, pulmonary embolism, implant loosening, need for revision surgery, leg length discrepancy, dislocation, lateral femoral cutaneous nerve palsy, intraoperative fracture. Informed consent for surgery was signed by the patient. Details: The patient was seen in the preoperative holding area where the site of surgery was marked and informed consent was confirmed. The patient was brought back to the operating room by OR personnel. Anesthesia was administered. The patient was positioned supine on the [place] table. The left lower extremity was then prepped and draped in a standard and sterile fashion. A final and formal timeout was then performed which confirmed the correct patient, correct position, and correct site of surgery. IV antibiotics were administered within 1 hour of the skin incision. A direct anterior supine approach was utilized. The skin and subcutaneous tissue were dissected down to the body of the tensor fascia matthieu.  Incision into the fascia of the TFL was made and dissection was carried medial to the tensor and lateral to the rectus femoris and sartorius. The anterior femoral circumflex vessels were identified and coagulated. The anterior capsule of the hip was exposed and an anterior capsulotomy was made. The femoral neck was exposed by two cobra retractors. A proximal femoral osteotomy was performed and the femoral head was removed. The acetabulum was exposed and debrided of labral and capsular structures. Osteophytes and other acetabular debris were removed. The acetabulum was reamed under direct fluroscopic evaluation. A press fit 58 mm cup was impacted into place in the appropriate abduction and anteversion. This was checked with the C arm and was found to be in satisfacfory position. There were no screws used to secure the fixation of the implant to the floor of the acetabulum. A +4.0 mm 40 mm liner was placed. The proximal femur was exposed by using the hook from the [place] table, externally rotating, extending, and adducting the leg. The proximal femur was prepared to accept a 6 high offset trial stem. The hip was reduced after placing the +8.5 mm 40 mm head segment over the Nata Huger taper of the trial implant. The C arm was brought in to confirm satisfactory hardware placement and leg length equality. The hip was dislocated and the trial implants were removed. Again using the hook from the Ranger table and extending, externally rotating, and adducting the hip, the number 6 high offset Actis stem was hammered into place in the appropriate anteversion. To better match leg length and offset, the +12 mm 40 mm ceramic head segment was placed over the Nata Huger taper. The hip was reduced. The C arm was brought in and confirmed satisfactory postion of the implants and leg length equity. The wound was irrigated and hemostasis was obtained. The wound was injected with local anesthetic. The wound was bathed with betadine.  The wound was closed in layers, a dressing was applied, and the patient was brought to recovery in satisfactory condition. JENNIFER Cardona was essential in patient positioning, surgical assistance during the arthroplasty, and in wound closure. Due to the patient's morbid obesity (BMI 42), additional time was needed for patient positioning, performance of the arthroplasty, and wound closure.     Elizabeth Navarro MD  3/1/2023

## 2023-03-01 NOTE — PROGRESS NOTES
Met with patient at bedside, patient is drowsy but orientedx4. discussed role of nurse navigator. Reviewed reasons to call with questions or concerns, importance of TEDS, Incentive spirometer, pain medication, and physical and occupational therapy. 2/4 bed rails up, bed in lowest position, fall precautions in place, call light within reach. Pulses present bilaterally +2 pedal, no drainage or odor noted at surgical dressing left hip. dry Dressing clean, dry, and intact. Ice in place. Julian and scds on BLEs. Neurovascular checks performed and moderate dorsi and plantar flexions noted bilaterally, toes appear appropriate to ethnicity in color, warm to touch, patient denies numbness or tingling. DC Plan: home with mother and hhc. mother to transport patient. DME needs:needs rolling walker and shower chair, agreeable to [facility] and Romilda Prime Rep informed.       Gurpreet Ness  Orthopedic Nurse Navigator  Phone number: [phone]    Future Appointments   Date Time Provider Sanjeev Martinez   3/16/2023  1:00 PM MD HARRISON Mike ORTHO [facility]   5/2/2023  1:00 PM DO MORRIS Koehler   Electronically signed by Josesito Clark RN on 3/1/2023 at 4:51 PM

## 2023-03-01 NOTE — ANESTHESIA POSTPROCEDURE EVALUATION
Department of Anesthesiology  Postprocedure Note    Patient: Saira Nguyen  MRN: [id number]  YOB: 1965  Date of evaluation: 3/1/2023      Procedure Summary     Date: 03/01/23 Room / Location: [address]    Anesthesia Start: 1304 Anesthesia Stop: 1445    Procedure: LEFT ANTERIOR TOTAL HIP REPLACEMENT WITH C-ARM (Left: Hip) Diagnosis:       Arthritis of left hip      (left hip arthritis)    Surgeons: Rainey Siemens, MD Responsible Provider: Hailey Chan MD    Anesthesia Type: General ASA Status: 3          Anesthesia Type: General    Lourdes Phase I: Luordes Score: 10    Lourdes Phase II:        Anesthesia Post Evaluation    Patient location during evaluation: PACU  Patient participation: complete - patient participated  Level of consciousness: sleepy but conscious and responsive to verbal stimuli  Airway patency: patent  Nausea & Vomiting: no nausea and no vomiting  Complications: no  Cardiovascular status: hemodynamically stable and blood pressure returned to baseline  Respiratory status: spontaneous ventilation, nonlabored ventilation and room air  Hydration status: stable  Comments: Mild obstructive pattern breathing following extubation. Uneventful transport to PACU. Mr. Bing Jauregui was seen resting comfortably following procedure. Anticipate transfer to floor when room available.

## 2023-03-01 NOTE — H&P
Update History & Physical    The patient's History and Physical of February 21, 2023 was reviewed with the patient and I examined the patient. There was no change. The surgical site was confirmed by the patient and me. Plan: The risks, benefits, expected outcome, and alternative to the recommended procedure have been discussed with the patient. Patient understands and wants to proceed with the procedure.      Electronically signed by Ross Fay MD on 3/1/2023 at 12:48 PM

## 2023-03-01 NOTE — CARE COORDINATION
Case Management Notes: pt plans to go home at dc and reports him mom will stay with him for the 1st 24 hrs. Pt asking for a walker with 2 wheels and a shower chair. RN Tommie Simons aware and will get orders and call [facility]. Pt reports no dc needs    Case Management Assessment  Initial Evaluation    Date/Time of Evaluation: 3/1/2023 4:39 PM  Assessment Completed by: [id number] ARLEY Alfonso    If patient is discharged prior to next notation, then this note serves as note for discharge by case management. Patient Name: Astrid Burton                   YOB: 1965  Diagnosis: Arthritis of left hip [M16.12]  Primary osteoarthritis of left hip [M16.12]                   Date / Time: 3/1/2023 11:11 AM    Patient Admission Status: Observation   Readmission Risk (Low < 19, Mod (19-27), High > 27): No data recorded  Current PCP: CARI Lancaster CNP  PCP verified by CM? Yes    Chart Reviewed: Yes      History Provided by: Patient  Patient Orientation: Alert and Oriented (very sleepy)    Patient Cognition: Alert    Hospitalization in the last 30 days (Readmission):  No    If yes, Readmission Assessment in CM Navigator will be completed. Advance Directives:      Code Status: Full Code   Patient's Primary Decision Maker is: Patient Declined (Legal Next of Kin Remains as Decision Maker)      Discharge Planning:    Patient lives with: Alone Type of Home: House  Primary Care Giver: Self  Patient Support Systems include: Parent   Current Financial resources:    Current community resources:    Current services prior to admission: Home Care            Current DME:              Type of Home Care services:  OT, PT    ADLS  Prior functional level: Independent in ADLs/IADLs  Current functional level: Independent in ADLs/IADLs    PT AM-PAC:   /24  OT AM-PAC:   /24    Family can provide assistance at DC:  Yes  Would you like Case Management to discuss the discharge plan with any other family members/significant others, and if so, who? Plans to Return to Present Housing: Yes  Other Identified Issues/Barriers to RETURNING to current housing: none  Potential Assistance needed at discharge: [facility]            Potential DME: Shower Chair, [address]  Patient expects to discharge to: [address] [place] [address] for transportation at discharge:      Financial    Payor: Wade Cancel / Plan: Johnny Cervantes / Product Type: *No Product type* /     Does insurance require precert for SNF: Yes    Potential assistance Purchasing Medications: No  Meds-to-Beds request:        [address], [address] [facility] [phone] - F [phone]  [address] [address]  Phone: [phone] Fax: [facility] [address], [address]  [address]  Phone: [phone] Fax: [phone]    [id number] [place] [address], [place] - [address] - P [phone] - F [phone]  [address] [place] [address] [address] [address]  Phone: [phone] Fax: [address], [address] [phone] Vero Montesinos [phone]  [address] [place] [id number]  Phone: [phone] Fax: [phone]      Notes:    Factors facilitating achievement of predicted outcomes: unsure pt was very sleeping during assmt but reports his mom will help him    Barriers to discharge: Limited family support    Additional Case Management Notes: pt plans to go home at dc and reports him mom will stay with him for the 1st 24 hrs. Pt asking for a walker with 2 wheels and a shower chair. TIMMY Pinto aware and will get orders and [facility].   Pt reports no dc needs    The Plan for Transition of Care is related to the following treatment goals of Arthritis of left hip [M16.12]  Primary osteoarthritis of left hip [T81.06]    IF APPLICABLE: The Patient and/or patient representative Mary Ann Oviedo and his family were provided with a choice of provider and agrees with the discharge plan. Freedom of choice list with basic dialogue that supports the patient's individualized plan of care/goals and shares the quality data associated with the providers was provided to: Patient   Patient Representative Name:       The Patient and/or Patient Representative Agree with the Discharge Plan?  Yes    [address] [place] [address]  Case Management Department  Ph: [phone] Fax: [phone]

## 2023-03-02 VITALS
TEMPERATURE: 97.6 F | HEIGHT: 68 IN | OXYGEN SATURATION: 94 % | RESPIRATION RATE: 18 BRPM | SYSTOLIC BLOOD PRESSURE: 132 MMHG | HEART RATE: 90 BPM | DIASTOLIC BLOOD PRESSURE: 85 MMHG | WEIGHT: 300.93 LBS | BODY MASS INDEX: 45.61 KG/M2

## 2023-03-02 LAB
ANION GAP SERPL CALCULATED.3IONS-SCNC: 11 MMOL/L (ref 3–16)
BUN BLDV-MCNC: 9 MG/DL (ref 7–20)
CALCIUM SERPL-MCNC: 9.2 MG/DL (ref 8.3–10.6)
CHLORIDE BLD-SCNC: 103 MMOL/L (ref 99–110)
CO2: 22 MMOL/L (ref 21–32)
CREAT SERPL-MCNC: 0.9 MG/DL (ref 0.9–1.3)
GFR SERPL CREATININE-BSD FRML MDRD: >60 ML/MIN/{1.73_M2}
GLUCOSE BLD-MCNC: 117 MG/DL (ref 70–99)
GLUCOSE BLD-MCNC: 127 MG/DL (ref 70–99)
MRSA SCREEN RT-PCR: ABNORMAL
ORGANISM: ABNORMAL
PERFORMED ON: ABNORMAL
POTASSIUM SERPL-SCNC: 4.8 MMOL/L (ref 3.5–5.1)
SODIUM BLD-SCNC: 136 MMOL/L (ref 136–145)

## 2023-03-02 PROCEDURE — 97116 GAIT TRAINING THERAPY: CPT

## 2023-03-02 PROCEDURE — 2580000003 HC RX 258: Performed by: ORTHOPAEDIC SURGERY

## 2023-03-02 PROCEDURE — 80048 BASIC METABOLIC PNL TOTAL CA: CPT

## 2023-03-02 PROCEDURE — 97161 PT EVAL LOW COMPLEX 20 MIN: CPT

## 2023-03-02 PROCEDURE — 97535 SELF CARE MNGMENT TRAINING: CPT

## 2023-03-02 PROCEDURE — G0378 HOSPITAL OBSERVATION PER HR: HCPCS

## 2023-03-02 PROCEDURE — 36415 COLL VENOUS BLD VENIPUNCTURE: CPT

## 2023-03-02 PROCEDURE — 6360000002 HC RX W HCPCS: Performed by: ORTHOPAEDIC SURGERY

## 2023-03-02 PROCEDURE — 6370000000 HC RX 637 (ALT 250 FOR IP): Performed by: ORTHOPAEDIC SURGERY

## 2023-03-02 PROCEDURE — 97530 THERAPEUTIC ACTIVITIES: CPT

## 2023-03-02 PROCEDURE — 97166 OT EVAL MOD COMPLEX 45 MIN: CPT

## 2023-03-02 RX ADMIN — OXYCODONE HYDROCHLORIDE 10 MG: 10 TABLET ORAL at 08:15

## 2023-03-02 RX ADMIN — ACETAMINOPHEN 650 MG: 325 TABLET ORAL at 04:21

## 2023-03-02 RX ADMIN — CEFAZOLIN 3000 MG: 10 INJECTION, POWDER, FOR SOLUTION INTRAVENOUS at 04:26

## 2023-03-02 RX ADMIN — ALLOPURINOL 300 MG: 300 TABLET ORAL at 08:14

## 2023-03-02 RX ADMIN — Medication 10 ML: at 08:15

## 2023-03-02 RX ADMIN — ACETAMINOPHEN 650 MG: 325 TABLET ORAL at 11:15

## 2023-03-02 RX ADMIN — PREGABALIN 75 MG: 75 CAPSULE ORAL at 08:15

## 2023-03-02 RX ADMIN — OXYCODONE 5 MG: 5 TABLET ORAL at 04:21

## 2023-03-02 RX ADMIN — APIXABAN 2.5 MG: 2.5 TABLET, FILM COATED ORAL at 08:14

## 2023-03-02 ASSESSMENT — PAIN DESCRIPTION - LOCATION
LOCATION: HIP

## 2023-03-02 ASSESSMENT — PAIN DESCRIPTION - DESCRIPTORS
DESCRIPTORS: ACHING

## 2023-03-02 ASSESSMENT — PAIN DESCRIPTION - PAIN TYPE
TYPE: SURGICAL PAIN
TYPE: SURGICAL PAIN

## 2023-03-02 ASSESSMENT — PAIN SCALES - WONG BAKER
WONGBAKER_NUMERICALRESPONSE: 2

## 2023-03-02 ASSESSMENT — PAIN DESCRIPTION - FREQUENCY
FREQUENCY: CONTINUOUS
FREQUENCY: INTERMITTENT

## 2023-03-02 ASSESSMENT — PAIN DESCRIPTION - ORIENTATION
ORIENTATION: LEFT

## 2023-03-02 ASSESSMENT — PAIN SCALES - GENERAL
PAINLEVEL_OUTOF10: 2
PAINLEVEL_OUTOF10: 0
PAINLEVEL_OUTOF10: 7
PAINLEVEL_OUTOF10: 4
PAINLEVEL_OUTOF10: 2

## 2023-03-02 ASSESSMENT — PAIN DESCRIPTION - ONSET
ONSET: ON-GOING
ONSET: ON-GOING

## 2023-03-02 ASSESSMENT — PAIN - FUNCTIONAL ASSESSMENT
PAIN_FUNCTIONAL_ASSESSMENT: PREVENTS OR INTERFERES SOME ACTIVE ACTIVITIES AND ADLS
PAIN_FUNCTIONAL_ASSESSMENT: PREVENTS OR INTERFERES SOME ACTIVE ACTIVITIES AND ADLS

## 2023-03-02 NOTE — PROGRESS NOTES
[id number] [facility] Orthopedic Surgery   Progress Note      S/P :  SUBJECTIVE  in bed. Alert and oriented. . Pain is   described in left hip and with the intensity of moderate. Pain is described as aching. OBJECTIVE              Physical                      VITALS:  /85   Pulse 90   Temp 97.6 °F (36.4 °C) (Oral)   Resp 18   Ht 5' 8\" (1.727 m)   Wt (!) 300 lb 14.9 oz (136.5 kg)   SpO2 94%   BMI 45.76 kg/m²                     MUSCULOSKELETAL:  left foot NVI. Wiggles toes to command. Able to plantarflex and dorsiflex ankle Pedal pulses are palpable. NEUROLOGIC:                                  Sensory:  Touch:  Left Lower Extremity:  normal                                                 Surgical wound appears clean and dry left hip with Prineo dressing . Interdry in pannus fold. CHELSEY hose on.      Data       CBC:   Lab Results   Component Value Date/Time    WBC 3.7 02/17/2023 01:21 PM    RBC 4.88 02/17/2023 01:21 PM    HGB 14.3 02/17/2023 01:21 PM    HCT 42.8 02/17/2023 01:21 PM    MCV 87.8 02/17/2023 01:21 PM    MCH 29.3 02/17/2023 01:21 PM    MCHC 33.4 02/17/2023 01:21 PM    RDW 14.5 02/17/2023 01:21 PM     02/17/2023 01:21 PM    MPV 8.9 02/17/2023 01:21 PM        WBC:    Lab Results   Component Value Date/Time    WBC 3.7 02/17/2023 01:21 PM        Hemoglobin/Hematocrit:    Lab Results   Component Value Date/Time    HGB 14.3 02/17/2023 01:21 PM    HCT 42.8 02/17/2023 01:21 PM        PT/INR:    Lab Results   Component Value Date/Time    PROTIME 12.8 02/17/2023 01:21 PM    INR 0.97 02/17/2023 01:21 PM              Current Inpatient Medications             Current Facility-Administered Medications: allopurinol (ZYLOPRIM) tablet 300 mg, 300 mg, Oral, Daily  sodium chloride flush 0.9 % injection 5-40 mL, 5-40 mL, IntraVENous, 2 times per day  sodium chloride flush 0.9 % injection 5-40 mL, 5-40 mL, IntraVENous, PRN  0.9 % sodium chloride infusion, , IntraVENous, PRN  acetaminophen (TYLENOL) tablet 650 mg, 650 mg, Oral, Q6H  ketorolac (TORADOL) injection 15 mg, 15 mg, IntraVENous, Once PRN  oxyCODONE (ROXICODONE) immediate release tablet 5 mg, 5 mg, Oral, Q4H PRN **OR** oxyCODONE HCl (OXY-IR) immediate release tablet 10 mg, 10 mg, Oral, Q4H PRN  morphine (PF) injection 2 mg, 2 mg, IntraVENous, Q3H PRN **OR** morphine (PF) injection 4 mg, 4 mg, IntraVENous, Q3H PRN  ondansetron (ZOFRAN-ODT) disintegrating tablet 4 mg, 4 mg, Oral, Q8H PRN **OR** ondansetron (ZOFRAN) injection 4 mg, 4 mg, IntraVENous, Q6H PRN  calcium carbonate (TUMS) chewable tablet 500 mg, 500 mg, Oral, TID PRN  pregabalin (LYRICA) capsule 75 mg, 75 mg, Oral, BID  apixaban (ELIQUIS) tablet 2.5 mg, 2.5 mg, Oral, BID    ASSESSMENT AND PLAN    Post left MEHDI, stable exam  DVT prophylaxis ordered, Eliquis bid for 14 total days after discharge from hospital for DVT prophylaxis   PT OT for ADL's and ambulation as tolerated  SS for DC planning, home with home care today  IV or PO pain med as ordered , Lyrica  added for postop pain control in an effort to reduce narcotic use per Dr Horacio Lamb protocol.       Bouchra Ramos, CARI - CNP  3/2/2023  9:21 AM

## 2023-03-02 NOTE — CARE COORDINATION
[facility]    DC order noted, all docs needed have been faxed to [facility] for home care services.     Home care to see patient within 24-48 hrs    Oziel Alfonso RN, BSN CTN  [facility] [phone]

## 2023-03-02 NOTE — PROGRESS NOTES
Pt resting in bed at beginning of shift. He reports pain in L hip with movement, but denies having any nausea, numbness, or tingling. Pulses palpable, skin warm to touch. He has strong flexion in BLE. Prineo intact and dry, edges of incision well approximated. Fall precautions in place, belongings within reach. Will continue to monitor and assess.

## 2023-03-02 NOTE — PROGRESS NOTES
Pt ws able to stand on the scale with some adriano on the left leg. Patient was seated bedside for 5 minutes.

## 2023-03-02 NOTE — PROGRESS NOTES
Physical Therapy  Facility/Department: [address] ORTHOPEDICS  Physical Therapy Initial Assessment / Discharge    Name: Dieudonne Khalil  : 1965  MRN: [id number]  Date of Service: 3/2/2023    Discharge Recommendations:  Patient would benefit from continued therapy after discharge, 24 hour supervision or assist (2-3)   Dieudonne Khalil scored a 21/24 on the AM-PAC short mobility form. Current research shows that an AM-PAC score of 18 or greater is typically associated with a discharge to the patient's home setting. Based on the patient's AM-PAC score and their current functional mobility deficits, it is recommended that the patient have 2-3 sessions per week of HOME Physical Therapy at d/c to increase the patient's independence. Patient Diagnosis(es): The encounter diagnosis was Primary osteoarthritis of left hip. Past Medical History:  has a past medical history of Arthritis, Chicken pox, Chronic GERD, Congestion of upper airway, COVID-19 virus infection, Dizziness, CORTES (generalized anxiety disorder), Gout, Hip pain, Hyperlipidemia, Obstructive sleep apnea syndrome, Panic attacks, Pneumonia due to COVID-19 virus, Rash, and Vitamin D deficiency. Past Surgical History:  has a past surgical history that includes knee surgery; Hand surgery; [place] tooth extraction; Tonsillectomy; Colonoscopy (2017); meniscectomy (Left); Knee arthroscopy (Right, 2017); Upper gastrointestinal endoscopy (N/A, 9/10/2018); and Total hip arthroplasty (Left, 3/1/2023). Assessment   Body Structures, Functions, Activity Limitations Requiring Skilled Therapeutic Intervention: Decreased functional mobility   Assessment: Patient post L THR 3-1-23 via Dr. Marky Cisneros. Prior status: lives alone in home with mult step entry, and independent in ADLs, IADLs, transfers, and gait without device.   Status 3-2-23: Patient demonstrates transfers, heavy duty wh walker gait x 50', and stairs as needed for home with SBA-CGA (brief Min A on stairs) and cues. He and mom were extensively educated on hip positional precautions and rationale for these, as well as how to maintain thes during home mobility. They were provided with [facility] HEP including written/pictoral hip positional precautions, as well as HEP of initial THR exs and activity expectations. Both verb full understanding of all instructions. He is appropriate to DC to home with inital 24 hour assist and home therapy. He requires a heavy duty wide wh walker to promote safe and quality gait. This has been issued. Therapy Prognosis: Good  Decision Making: Low Complexity  History: as noted  Clinical Presentation: Stable  Requires PT Follow-Up: No (transition to next level of care)  Activity Tolerance  Activity Tolerance: Patient tolerated evaluation without incident     Plan   Safety Devices  Type of Devices: Call light within reach, Chair alarm in place, Gait belt, Patient at risk for falls, Nurse notified, Left in chair     Restrictions  Restrictions/Precautions  Restrictions/Precautions: Weight Bearing, Fall Risk  Lower Extremity Weight Bearing Restrictions  Left Lower Extremity Weight Bearing: Weight Bearing As Tolerated  Position Activity Restriction  Hip Precautions: No hip flexion > 90 degrees, No hip external rotation, No hip extension, Anterior hip precautions     Subjective   General  Chart Reviewed: Yes  Patient assessed for rehabilitation services?: Yes  Additional Pertinent Hx: 61 yo m lui to hospitals 3-1-23 for elective R THR via Dr. Rubi Saldana. Other PMHx as noted  Response To Previous Treatment: Not applicable  Family / Caregiver Present: Yes (mom)  Referring Practitioner: Josh Lui MD  Referral Date : 03/01/23  Subjective  Subjective: Patient in bed. Agreeable to therapy Reports L anterior groin pain 6/10 with mobility. Reports dizziness with initial stance.          Social/Functional History  Social/Functional History  Lives With: Alone (mother to stay)  Type of Home: [facility] Layout: Two level, Able to Live on Main level with bedroom/bathroom (recliner chair, full bath)  Home Access: Stairs to enter with rails  Entrance Stairs - Number of Steps: 8 steps to enter 2+5+1  Entrance Stairs - Rails: Left  Bathroom Shower/Tub: Tub/Shower unit  Bathroom Toilet: Standard (vanity close)  Bathroom Accessibility: Walker accessible  Home Equipment: [place] Hero, 4 wheeled, Long-handled shoehorn, Lift chair  Has the patient had two or more falls in the past year or any fall with injury in the past year?: No  ADL Assistance: Independent  Homemaking Assistance: Independent  Ambulation Assistance: Independent  Transfer Assistance: Independent  Active : Yes  Additional Comments: Here for elective Left THR: Reports Rigth hip \"not good\", B knees \"bad\"    Vision/Hearing  Vision  Vision: Within Functional Limits  Hearing  Hearing: Within functional limits      Cognition   Orientation  Overall Orientation Status: Within Functional Limits  Cognition  Overall Cognitive Status: WFL     Objective   AROM RLE (degrees)  RLE AROM: WFL  RLE General AROM: min guarding  AROM LLE (degrees)  LLE AROM : WFL  Strength RLE  Strength RLE: WNL  Strength LLE  Strength LLE: WFL  Bed mobility  Supine to Sit: Stand by assistance (HOB up. extra time. Using looped belt as leg . Effortful)  Sit to Supine: Unable to assess (in bs chair at end of session)  Transfers  Sit to Stand: Stand by assistance (cues for technique)  Stand to Sit: Stand by assistance  Comment: Therapist demonstrates and verbally instructs patient/mom in correct car transfer technique in context of maintaining hip positional precautions. They verb understanding. Ambulation  Surface: Level tile  Device: Rolling Walker (heavy duty wide)  Assistance: Stand by assistance  Quality of Gait: Initial cues to maintain controlled karina and remain within wh walker base. Step through pattern. Good bearing through L LE.   Stable  Distance: 48'  Stairs/Curb  Stairs?: Yes  Stairs  # Steps : 8  Stairs Height: 6\"  Assistance: Minimal assistance;Stand by assistance  Comment: 2 steps with B rails and CGA. 3rd/4th step with B hands to L rail with Min A, cues, and L knee min giving way as R LE is progressed up to next step. Desends with B rails. Then ascends 4 stairs with L rail and R crutch (mom states having crutches) with SBA-CGA, very stable. Patient instructed to use this technique, or R str cane vs crutch for home entry. Patient then ascends (backwards) / descneds (forward) 6\" platform step using walker, CGA, and cues. Good technique. Balance  Posture: Good  Sitting - Static: Good  Sitting - Dynamic: Good  Standing - Static: Good (supported)  Standing - Dynamic: Good (at wh walker)    AM-PAC Score  AM-PAC Inpatient Mobility Raw Score : 21 (03/02/23 1103)  AM-PAC Inpatient T-Scale Score : 50.25 (03/02/23 1103)  Mobility Inpatient CMS 0-100% Score: 28.97 (03/02/23 1103)  Mobility Inpatient CMS G-Code Modifier : Nadia Martell (03/02/23 1103)    Goals  Patient Goals   Patient Goals : \"To walk normal.\"       Education  Patient Education  Education Provided Comments: THR HEP, including activity expectations, and pictoral written Positional precautions provided to patient and fully reviewed 3-2-23: [email or web address]  Access Code: [email or web address]  Education Method: Demonstration;Verbal;Teach Back  Barriers to Learning: None  Education Outcome: Verbalized understanding;Demonstrated understanding      Therapy Time   Individual Concurrent Group Co-treatment   Time In 9457         Time Out 1050         Minutes 73            Ev 15; TA 35; Gt 25    Cornelia Abernathy PT  Electronically signed by Viviane Macias, [address] [place] [address] Drive [phone])  on 3/2/2023 at 4:48 PM

## 2023-03-02 NOTE — PROGRESS NOTES
Patient in bed, alert and oriented , did not  void yet since surgery he said he feels has has to go, he ws given a urinal . Patient said he is comfortable as far as pain.

## 2023-03-02 NOTE — PLAN OF CARE
Problem: Chronic Conditions and Co-morbidities  Goal: Patient's chronic conditions and co-morbidity symptoms are monitored and maintained or improved  Outcome: Progressing  Flowsheets  Taken 3/2/2023 0749 by Amna Whitman RN  Care Plan - Patient's Chronic Conditions and Co-Morbidity Symptoms are Monitored and Maintained or Improved:   Monitor and assess patient's chronic conditions and comorbid symptoms for stability, deterioration, or improvement   Collaborate with multidisciplinary team to address chronic and comorbid conditions and prevent exacerbation or deterioration   Update acute care plan with appropriate goals if chronic or comorbid symptoms are exacerbated and prevent overall improvement and discharge  Taken 3/1/2023 2138 by Davida Urena RN  Care Plan - Patient's Chronic Conditions and Co-Morbidity Symptoms are Monitored and Maintained or Improved: Monitor and assess patient's chronic conditions and comorbid symptoms for stability, deterioration, or improvement     Problem: Discharge Planning  Goal: Discharge to home or other facility with appropriate resources  Outcome: Progressing  Flowsheets  Taken 3/2/2023 0749 by Amna Whitman RN  Discharge to home or other facility with appropriate resources:   Identify barriers to discharge with patient and caregiver   Identify discharge learning needs (meds, wound care, etc)   Refer to discharge planning if patient needs post-hospital services based on physician order or complex needs related to functional status, cognitive ability or social support system  Taken 3/1/2023 2138 by Davida Urena RN  Discharge to home or other facility with appropriate resources:   Arrange for needed discharge resources and transportation as appropriate   Identify barriers to discharge with patient and caregiver   Arrange for interpreters to assist at discharge as needed   Refer to discharge planning if patient needs post-hospital services based on physician order or complex needs related to functional status, cognitive ability or social support system   Identify discharge learning needs (meds, wound care, etc)     Problem: Neurosensory - Adult  Goal: Achieves stable or improved neurological status  Outcome: Progressing  Flowsheets  Taken 3/2/2023 0749 by Link Banegas RN  Achieves stable or improved neurological status:   Assess for and report changes in neurological status   Monitor temperature, glucose, and sodium.  Initiate appropriate interventions as ordered  Taken 3/1/2023 2138 by Shabana Car RN  Achieves stable or improved neurological status: Assess for and report changes in neurological status     Problem: Respiratory - Adult  Goal: Achieves optimal ventilation and oxygenation  Outcome: Progressing  Flowsheets (Taken 3/2/2023 0749)  Achieves optimal ventilation and oxygenation:   Assess for changes in respiratory status   Position to facilitate oxygenation and minimize respiratory effort   Respiratory therapy support as indicated   Assess for changes in mentation and behavior   Assess and instruct to report shortness of breath or any respiratory difficulty     Problem: Skin/Tissue Integrity - Adult  Goal: Skin integrity remains intact  Outcome: Progressing  Flowsheets  Taken 3/2/2023 0749 by Link Banegas RN  Skin Integrity Remains Intact: Monitor for areas of redness and/or skin breakdown  Taken 3/2/2023 0027 by Shabana Car RN  Skin Integrity Remains Intact:   Monitor for areas of redness and/or skin breakdown   Assess vascular access sites hourly  Taken 3/1/2023 2138 by Shabana Car RN  Skin Integrity Remains Intact:   Monitor for areas of redness and/or skin breakdown   Assess vascular access sites hourly  Goal: Incisions, wounds, or drain sites healing without S/S of infection  Outcome: Progressing  Flowsheets  Taken 3/2/2023 0749 by Lnik Banegas RN  Incisions, Wounds, or Drain Sites Healing Without Sign and Symptoms of Infection: TWICE DAILY: Assess and document dressing/incision, wound bed, drain sites and surrounding tissue  Taken 3/2/2023 0027 by Bren Heard, RN  Incisions, Wounds, or Drain Sites Healing Without Sign and Symptoms of Infection: TWICE DAILY: Assess and document skin integrity  Taken 3/1/2023 2138 by Bren Heard RN  Incisions, Wounds, or Drain Sites Healing Without Sign and Symptoms of Infection: TWICE DAILY: Assess and document skin integrity     Problem: Musculoskeletal - Adult  Goal: Return mobility to safest level of function  Outcome: Progressing  Flowsheets (Taken 3/2/2023 0749)  Return Mobility to Safest Level of Function:   Assess patient stability and activity tolerance for standing, transferring and ambulating with or without assistive devices   Assist with transfers and ambulation using safe patient handling equipment as needed   Ensure adequate protection for wounds/incisions during mobilization   Obtain physical therapy/occupational therapy consults as needed   Instruct patient/family in ordered activity level  Goal: Maintain proper alignment of affected body part  Outcome: Progressing  Flowsheets (Taken 3/2/2023 0749)  Maintain proper alignment of affected body part:   Support and protect limb and body alignment per provider's orders   Instruct and reinforce with patient and family use of appropriate assistive device and precautions (e.g. spinal or hip dislocation precautions)  Goal: Return ADL status to a safe level of function  Outcome: Progressing  Flowsheets (Taken 3/2/2023 0749)  Return ADL Status to a Safe Level of Function:   Administer medication as ordered   Obtain physical therapy/occupational therapy consults as needed   Assess activities of daily living deficits and provide assistive devices as needed   Assist and instruct patient to increase activity and self care as tolerated     Problem: Infection - Adult  Goal: Absence of infection at discharge  Outcome: Progressing  Flowsheets  Taken 3/2/2023 0749 by Cherri Saeed Akshat Hubbard RN  Absence of infection at discharge:   Assess and monitor for signs and symptoms of infection   Monitor lab/diagnostic results   Monitor all insertion sites i.e., indwelling lines, tubes and drains   Administer medications as ordered   Instruct and encourage patient and family to use good hand hygiene technique  Taken 3/2/2023 0027 by Diogenes García RN  Absence of infection at discharge:   Assess and monitor for signs and symptoms of infection   Monitor lab/diagnostic results   Monitor all insertion sites i.e., indwelling lines, tubes and drains  Taken 3/1/2023 2138 by Diogenes García RN  Absence of infection at discharge:   Assess and monitor for signs and symptoms of infection   Monitor lab/diagnostic results  Goal: Absence of infection during hospitalization  Outcome: Progressing  Flowsheets  Taken 3/2/2023 0749 by Devon Alston RN  Absence of infection during hospitalization:   Assess and monitor for signs and symptoms of infection   Monitor lab/diagnostic results   Monitor all insertion sites i.e., indwelling lines, tubes and drains   Administer medications as ordered   Instruct and encourage patient and family to use good hand hygiene technique  Taken 3/2/2023 0027 by Diogenes García RN  Absence of infection during hospitalization:   Assess and monitor for signs and symptoms of infection   Monitor lab/diagnostic results  Taken 3/1/2023 2138 by Diogenes García RN  Absence of infection during hospitalization:   Assess and monitor for signs and symptoms of infection   Monitor lab/diagnostic results     Problem: Metabolic/Fluid and Electrolytes - Adult  Goal: Glucose maintained within prescribed range  Outcome: Progressing  Flowsheets  Taken 3/2/2023 0749 by Devon Alston RN  Glucose maintained within prescribed range:   Monitor blood glucose as ordered   Assess for signs and symptoms of hyperglycemia and hypoglycemia  Taken 3/1/2023 2138 by Diogenes García RN  Glucose maintained within prescribed range: Monitor blood glucose as ordered     Problem: Pain  Goal: Verbalizes/displays adequate comfort level or baseline comfort level  Outcome: Progressing  Flowsheets (Taken 3/2/2023 0749)  Verbalizes/displays adequate comfort level or baseline comfort level:   Encourage patient to monitor pain and request assistance   Administer analgesics based on type and severity of pain and evaluate response   Consider cultural and social influences on pain and pain management   Assess pain using appropriate pain scale   Implement non-pharmacological measures as appropriate and evaluate response   Notify Licensed Independent Practitioner if interventions unsuccessful or patient reports new pain     Problem: Safety - Adult  Goal: Free from fall injury  Outcome: Progressing  Flowsheets (Taken 3/2/2023 0749)  Free From Fall Injury: Instruct family/caregiver on patient safety     Problem: ABCDS Injury Assessment  Goal: Absence of physical injury  Outcome: Progressing  Flowsheets (Taken 3/2/2023 0749)  Absence of Physical Injury: Implement safety measures based on patient assessment

## 2023-03-02 NOTE — ACP (ADVANCE CARE PLANNING)
Advance Care Planning     Advance Care Planning Inpatient Note  [facility] Department    Today's Date: 3/2/2023  Unit: [facility] ORTHOPEDICS    Received request from [place]. Upon review of chart and communication with care team, patient's decision making abilities are not in question. . Patient was/were present in the room during visit. Goals of ACP Conversation:  Discuss advance care planning documents    Health Care Decision Makers:     No healthcare decision makers have been documented. Click here to complete [address] including selection of the Healthcare Decision Maker Relationship (ie \"Primary\")  Summary:  No Decision Maker named by patient at this time    Advance Care Planning Documents (Patient Wishes):  None     Assessment:  Pt would like to take the AD docs home to review and complete.      Interventions:  Provided education on documents for clarity and greater understanding  Discussed and provided education on state decision maker hierarchy  Encouraged ongoing ACP conversation with future decision makers and loved ones  Reviewed but did not complete ACP document    Care Preferences Communicated:   No    Outcomes/Plan:  ACP Discussion: Postponed  Pt to follow-up    Electronically signed by Ivette Capellan on 3/2/2023 at 8:39 AM

## 2023-03-02 NOTE — PROGRESS NOTES
Educated on mrsa nares +, states he has Bactroban ointment at home already from his physician.   Electronically signed by Duyen Syed RN on 3/2/2023 at 10:58 AM

## 2023-03-02 NOTE — PROGRESS NOTES
Occupational Therapy  Facility/Department: [facility] 9Q ORTHOPEDICS  Occupational Therapy Initial Assessment    Name: Maru Benavides  : 1965  MRN: [id number]  Date of Service: 3/2/2023    Discharge Recommendations:  2-3 sessions per week, Patient would benefit from continued therapy after discharge, Home with Home health OT, 24 hour supervision or assist (Pt will required TTB for safe seated bathing within tub shower and raised toilet seated with arms to maintain hip precautions)        Maru Benavides scored a 18/24 on the AM-PAC ADL Inpatient form. Current research shows that an AM-PAC score of 18 or greater is typically associated with a discharge to the patient's home setting. Based on the patient's AM-PAC score, and their current ADL deficits, it is recommended that the patient have 2-3 sessions per week of Occupational Therapy at d/c to increase the patient's independence. At this time, this patient demonstrates the endurance and safety to discharge home with [address] [place] [address] (home vs OP services) and a follow up treatment frequency of 2-3x/wk. Please see assessment section for further patient specific details. If patient discharges prior to next session this note will serve as a discharge summary. Please see below for the latest assessment towards goals. Patient Diagnosis(es): The encounter diagnosis was Primary osteoarthritis of left hip. Past Medical History:  has a past medical history of Arthritis, Chicken pox, Chronic GERD, Congestion of upper airway, COVID-19 virus infection, Dizziness, CORTES (generalized anxiety disorder), Gout, Hip pain, Hyperlipidemia, Obstructive sleep apnea syndrome, Panic attacks, Pneumonia due to COVID-19 virus, Rash, and Vitamin D deficiency. Past Surgical History:  has a past surgical history that includes knee surgery; Hand surgery; [place] tooth extraction; Tonsillectomy; Colonoscopy (2017); meniscectomy (Left); Knee arthroscopy (Right, 2017);  Upper gastrointestinal endoscopy (N/A, 9/10/2018); and Total hip arthroplasty (Left, 3/1/2023). Treatment Diagnosis: impaired ADL/fxl mobility    Assessment   Performance deficits / Impairments: Decreased functional mobility ; Decreased balance;Decreased ADL status; Decreased high-level IADLs;Decreased endurance  Assessment: 60 yo male admitted 3/2 for elective L MEHDI now WBAT with anterior approach. PMH: CKD, Obesity, DM, DDD. PTA, pt lives alone and fully IND no AD use. Today, pt functioning below basleiine limited by L hip pain, and decreased balance and endurance. Pt and mother educated on seated bathing/dressing (will require TTB for safe bathing for fall prevention), LB dressing techniques with issued adpative equipment, harjinder hose wear/dressing, and importance of hourly fxl mobility, ice L hip and elevation on BLE. Pt completes fxl tx, standing balance PRN, donning pants, donning socks and donning shoes with AE with SBA. Pt does require min increased time and cues for safety. BUE WFL for self care. Cont acute OT to address above deficits. Rec return home with family for 24 hr SUP and OT 2-3x/week.  Pt will required TTB for safe seated bathing within tub shower and raised toilet seated with arms to maintain hip precautions  Treatment Diagnosis: impaired ADL/fxl mobility  Prognosis: Good  Decision Making: Medium Complexity  REQUIRES OT FOLLOW-UP: Yes  Activity Tolerance  Activity Tolerance: Patient Tolerated treatment well        Plan   Occupational Therapy Plan  Times Per Week: 2-3  Current Treatment Recommendations: Strengthening, ROM, Balance training, Functional mobility training, Endurance training, Pain management, Safety education & training, Positioning, Patient/Caregiver education & training, Self-Care / ADL, Modalities, Home management training     Restrictions  Restrictions/Precautions  Restrictions/Precautions: Weight Bearing, Fall Risk  Lower Extremity Weight Bearing Restrictions  Left Lower Extremity Weight Bearing: Weight Bearing As Tolerated  Position Activity Restriction  Hip Precautions: No hip flexion > 90 degrees, No hip external rotation, No hip extension, Anterior hip precautions    Subjective   General  Chart Reviewed: Yes  Patient assessed for rehabilitation services?: Yes  Additional Pertinent Hx: 60 yo male admitted 3/2 for elective L MEHDI now WBAT with anterior approach. PMH: CKD, Obesity, DM, DDD  Family / Caregiver Present: Yes (mother)  Referring Practitioner: Ilene Gutierrez MD  Diagnosis: L MEHDI  Subjective  Subjective: Pt resting in recliner upon arrival (pt being wheeled into room by PT) and agreeable to OT eval. Pt reporting moderate L hip pain with activity. General Comment  Comments: RN ok to see       Social/Functional History  Social/Functional History  Lives With: Alone (mother to stay)  Type of Home: House  Home Layout: Two level, Able to Live on Main level with bedroom/bathroom (recliner chair, full bath)  Home Access: Stairs to enter with rails  Entrance Stairs - Number of Steps: 8 steps to enter 2+5+1  Entrance Stairs - Rails: Left  Bathroom Shower/Tub: Tub/Shower unit  Bathroom Toilet: Standard (vanity close)  Bathroom Accessibility: Walker accessible  Home Equipment: Mayme Cohens, 4 wheeled, Long-handled shoehorn, Lift chair  Has the patient had two or more falls in the past year or any fall with injury in the past year?: No  ADL Assistance: Independent  Homemaking Assistance: Independent  Ambulation Assistance: Independent  Transfer Assistance: Independent  Active : Yes  Additional Comments: Here for elective Left THR: Reports Rigth hip \"not good\", B knees \"bad\"       Objective           Observation/Palpation  Observation: BLE harjinder hose (adjusted for proper wear)  Safety Devices  Type of Devices: Call light within reach; Chair alarm in place;Gait belt;Patient at risk for falls;Nurse notified; Left in chair          AROM: Within functional limits  PROM: Within functional limits  Strength:  Within functional limits  Coordination: Within functional limits  Tone: Normal    ADL  Equipment Provided: Reacher;Sock aid;Long-handled shoe horn;Long-handled sponge (issued and used)  UE Dressing: Setup  UE Dressing Skilled Clinical Factors: seated donning shirt  LE Dressing: Stand by assistance  LE Dressing Skilled Clinical Factors: After education on AE. SBA donning pants with reacher with cues to thread LLE first. SBA donning socks with with sock aid. SBA donning shoes with shoe horn          Bed mobility  Bed Mobility Comments: not observed. recliner at start and EOS    Transfers  Sit to stand: Stand by assistance  Stand to sit: Stand by assistance  Transfer Comments: RW. min cues hand placement. to/from recliner x4. Standing balance: SBA PRN tx and pant management                 Fxl mobility: pt declines mobility as just finished with PT session    Vision  Vision: Within Functional Limits  Hearing  Hearing: Within functional limits    Cognition  Overall Cognitive Status: WFL  Orientation  Overall Orientation Status: Within Functional Limits                    Education Given To: Patient  Education Provided: Role of Therapy;Plan of Care;Transfer Training;Precautions; Fall Prevention Strategies; ADL Adaptive Strategies; Equipment  Education Provided Comments: Pt and mother educated on seated bathing/dressing (will require TTB for safe bathing for fall prevention), LB dressing techniques with issued adpative equipment, harjinder hose wear/dressing, and importance of hourly fxl mobility, ice L hip and elevation on BLE  Education Method: Demonstration;Verbal  Barriers to Learning: None  Education Outcome: Verbalized understanding;Demonstrated understanding                      AM-PAC Score        AM-PAC Inpatient Daily Activity Raw Score: 18 (03/02/23 1127)  AM-PAC Inpatient ADL T-Scale Score : 38.66 (03/02/23 1127)  ADL Inpatient CMS 0-100% Score: 46.65 (03/02/23 1127)  ADL Inpatient CMS G-Code Modifier : CK (03/02/23 1127)    Goals  Short Term Goals  Time Frame for Short Term Goals: prior to d/c  Short Term Goal 1: toileting SUP  Short Term Goal 2: LB dressing with AE SUP  Short Term Goal 3: tolerate 5 min fxl standing task SUP  Short Term Goal 4: fxl tx and mobility with RW household distances SUP  Patient Goals   Patient goals : return home with family       Therapy Time   Individual Concurrent Group Co-treatment   Time In 8903         Time Out 1127         Minutes 40         Timed Code Treatment Minutes: 25 Minutes (15 eval. 25 ADL)       KALIA Solano,OTRL

## 2023-03-02 NOTE — PROGRESS NOTES
Clinical Pharmacy Note  Medication Counseling    Reviewed new medications started during hospital admission: Eliquis, Lyrica, oxycodone. Indications and side effects were emphasized during counseling. All medication-related questions addressed. Patient verbalized understanding of education. Should the patient express any additional questions or concerns regarding their medications, please do not hesitate to contact the pharmacy department. Patient/caregiver aware they may refuse medications during hospital stay. 5 minutes spent educating patient regarding medications.     Mine iDaz [facility], PharmD, 3/2/2023 11:25 AM

## 2023-03-02 NOTE — PROGRESS NOTES
Pt. In bed drowsy his left anterior hip dressing is clean , dry and intact. He denies nausea and pain at this time.

## 2023-03-02 NOTE — PROGRESS NOTES
Hudlu performed this morning including Nurse navigator Diana Garcia, Physical therapist beryl, and Occupational therapist nancy. Discussed plan of care, discharge plan, and dme needs if applicable for orthopedic total joint patient.

## 2023-03-02 NOTE — PROGRESS NOTES
Data- Discharge order received, patient verbalized agreement to discharge, disposition to previous residence, needs noted for [employer] and [address] [place] [address] and informed Bobby Huston NP. Action- discharge instructions prepared and given to patient and mother, patient and mother verbalized understanding. Medication information packet given related to NEW and/or CHANGED prescriptions emphasizing name/purpose/side effects, pt verbalized understanding. Discharge instruction summary: Diet- General, Activity- Full weight bearing, Primary Care Physician as follows: Allison Mandujano, APRN - [phone]. Follow up appointment with orthopedic office noted below, immunizations reviewed and discussed with patient, prescription medications to be filled by retail pharmacy and then delivered. Inpatient surgical procedure precautions reviewed: Anterior Hip Precautions. Educated patient on using incentive spirometer post-discharge per surgeon's instructions. Neurovascular checks performed and moderate dorsi and plantar flexions noted bilaterally, toes appear appropriate to ethnicity in color, Warm to touch, patient reports numbness in left hip at incision only . Left Hip Incision site prineo glue dressing assessed and is clean,dry, and intact, no signs of redness, drainage, or odor noted. Proximal end of incision has excessive tape which is starting to peel but is intact over incision site. patient's bedside RN oscar notified of patient completing discharge instructions. Nurse Navigator and Orthopedic Office contact information on discharge instructions and provided to patient. Educated on eliquis as dvt prophylaxis for 30 days post hip surgery, patient verbalized understanding. Response-. prescription medications to be filled by [email or web address] and then delivered. Disposition is home with mother and [address] [employer]. Durable Medical Equipment to be delivered to patient by [facility].  Patient to be transported by mother .     Future Appointments   Date Time Provider Sanjeev Martinez   3/16/2023  1:00 PM MD HARRISON Hudson [facility]   5/2/2023  1:00 PM DO MORRIS Dean     Electronically signed by Elier Green RN on 3/2/2023 at 9:35 AM

## 2023-03-03 ENCOUNTER — TELEPHONE (OUTPATIENT)
Dept: ORTHOPEDIC SURGERY | Age: 58
End: 2023-03-03

## 2023-03-03 NOTE — TELEPHONE ENCOUNTER
[address] [place] Name: [address] [place] [address] [facility]OM[facility]ARE  Contact Name: Claudean Kearns Number: [phone]  Request or Information:       STATES THAT THEY NEED VERBAL ORDERS FOR HOME PT AND OT.  PLEASE ADVISE

## 2023-03-06 ENCOUNTER — TELEPHONE (OUTPATIENT)
Dept: ORTHOPEDICS UNIT | Age: 58
End: 2023-03-06

## 2023-03-06 NOTE — TELEPHONE ENCOUNTER
Spoke with patient regarding post discharge from hospital.    Incision status: No drainage, odor, or redness noted per patient    Edema/Swelling/Teds: edema noted, wearing teds    Pain level and status: 4/10 tolerable     Use of pain medications: yes ; Patient stated they are taking their pain medication oxycodone as prescribed. Use of ice therapy: yes     Blood thinner: eliquis 2.5mg ; Verified with patient that they are taking their anticoagulant as prescribed twice a day. Bowels: no constipation    Home Care Agency active: yes ; Claims already worked with therapists . Outpatient therapy: n/a    Do you have all of your medications: yes    Changes in medications: no    No other questions/concerns at this time. Encouraged patient to call Orthopedic Nurse Navigator Kim Lim or Orthopedic office if has any questions/concerns.       Follow up appointments:    Future Appointments   Date Time Provider Sanjeev Martinez   3/16/2023  1:00 PM MD HARRISON Raya [facility]   5/2/2023  1:00 PM Mau Christine DO [facility]   Electronically signed by Jamal Hayes RN on 3/6/2023 at 4:40 PM

## 2023-03-06 NOTE — DISCHARGE SUMMARY
Physician Discharge Summary     Patient ID:  Ernesto Maza  [id number]  82 y.o.  1965    Admit date: 3/1/2023    Discharge date and time: 3/2/2023 11:54 AM     Admitting Physician: China Bishop MD     Discharge Physician: Janie Schroeder    Admission Diagnoses: Arthritis of left hip [M16.12]  Primary osteoarthritis of left hip [M16.12]    Discharge Diagnoses: left  hip OA    Admission Condition: good    Discharged Condition: good    Indication for Admission: Failed conservative treatment as outpatient for joint pain including PT and pain meds. This patient was then electively scheduled for total joint replacement surgery    Surgical procedure: left MEHDI    Consults: PT OT SS    This patient had no postoperative complications. They has PT and OT for ADL's . IV and PO pain med for pain control and was eventually DC in stable condition    Treatments: analgesia,  therapies: PT OT,  and surgery      Disposition: home    Patient Instructions:   [unfilled]  Activity: activity as tolerated  Diet: regular diet  Wound Care: keep wound clean and dry    Follow-up with CAIT Hammer in 2 weeks.     Signed:  CARI Zaidi CNP  3/6/2023  11:17 AM

## 2023-03-09 ENCOUNTER — TELEPHONE (OUTPATIENT)
Dept: ORTHOPEDIC SURGERY | Age: 58
End: 2023-03-09

## 2023-03-09 NOTE — TELEPHONE ENCOUNTER
POST OP PROBLEM    Subject: LT LEG AND ANKLE SWELLING/S/P LT HIP SX 3/1  Patient Request: César Mendez"  Contact Number: [phone]    PATIENT IS CALLING REGARDING SWELLING TO HIS LT LEG AND ANKLE. PATIENT STATES HE'S HAD SWELLING AT THE KNEE CAP [address] [place] [address] Road AND CANNOT SEE HIS KNEE DUE TO SWELLING. DID NOTICE YESTERDAY SWELLING IS DOWN THE LEG AND INTO THE ANKLE. NO CALF PAIN OR FEVER. WILL REACH OUT TO CLINIC.

## 2023-03-15 ENCOUNTER — TELEPHONE (OUTPATIENT)
Dept: ORTHOPEDIC SURGERY | Age: 58
End: 2023-03-15

## 2023-03-16 ENCOUNTER — OFFICE VISIT (OUTPATIENT)
Dept: ORTHOPEDIC SURGERY | Age: 58
End: 2023-03-16

## 2023-03-16 DIAGNOSIS — M25.562 ACUTE PAIN OF LEFT KNEE: ICD-10-CM

## 2023-03-16 DIAGNOSIS — Z96.642 STATUS POST TOTAL REPLACEMENT OF LEFT HIP: Primary | ICD-10-CM

## 2023-03-16 DIAGNOSIS — M25.579 ANKLE PAIN, UNSPECIFIED CHRONICITY, UNSPECIFIED LATERALITY: ICD-10-CM

## 2023-03-16 RX ORDER — METHYLPREDNISOLONE 4 MG/1
TABLET ORAL
Qty: 1 KIT | Refills: 0 | Status: SHIPPED | OUTPATIENT
Start: 2023-03-16

## 2023-03-16 NOTE — PROGRESS NOTES
[facility]  Office Visit    Chief Complaint: Follow-up s/p left total hip arthroplasty    HPI:  Jm Francisco is a 57 y.o. who is here in follow-up of left total hip arthroplasty performed on March 1, 2023.  He had been doing well postoperatively but developed left ankle and knee pain over the past few days.  There are no new injuries.  He walks with use of walker and has been taking Tylenol for pain control.  He has physical therapy arranged.  He reports worsening pain and swelling in his left knee and ankle over the past few days that makes walking challenging.      Patient Active Problem List   Diagnosis    Staph skin infection    Microalbuminuria    Hyperglycemia    Polyuria    Failure of erection    Chronic kidney disease, stage II (mild)    Benign paroxysmal positional nystagmus    Erectile dysfunction    Vitamin D deficiency    Allergic rhinitis    Benign paroxysmal positional vertigo    Morbid obesity with BMI of 50.0-59.9, adult ([facility])    HLD (hyperlipidemia)    Obstructive sleep apnea syndrome    Panic attack    Controlled type 2 diabetes mellitus without complication, without long-term current use of insulin ([facility])    Primary osteoarthritis of both knees    Complex tear of lateral meniscus of right knee as current injury    Complex tear of medial meniscus of right knee as current injury    Degenerative disc disease, cervical    Idiopathic chronic gout of multiple sites without tophus    Chronic GERD    Hemorrhoid    Numbness    Hand pain, right    Arthritis of carpometacarpal (CMC) joint of right thumb    Enlarged lymph node    Groin pain, left    Pneumonia due to COVID-19 virus    Primary osteoarthritis of left hip       ROS:  Constitutional: denies fever, chills, weight loss  MSK: denies pain in other joints, muscle aches  Neurological: denies numbness, tingling, weakness    Exam:    Appearance: sitting in exam room chair, appears to be in no acute distress, awake and  alert  Resp: unlabored breathing on room air  Skin: warm, dry and intact with out erythema or significant increased temperature  Neuro: grossly intact both lower extremities. Intact sensation to light touch. Motor exam 4+ to 5/5 in all major motor groups. Left hip: Incision is healing as expected. Examination demonstrates negative logroll and negative Stinchfield. There is brisk capillary refill. Strength is 5/5 in hamstrings, quads, hip flexors. LLE: Mild knee effusion. Crepitus with active knee range of motion. Tenderness and swelling about the ankle. Calf is soft and nontender. Imaging:  3 views of the left hip were performed and reviewed today. Significant for total hip arthroplasty prosthesis in place with no signs of osteolysis, loosening, fracture, or dislocation. 4 views of the left knee were performed and interpreted today. Significant for tricompartmental degenerative changes due to osteoarthritis. There are no fractures or dislocations. 3 views of the left ankle were performed and interpreted today. He has relatively maintained joint spaces with no fractures or dislocations. Assessment:  S/p left total hip arthroplasty    Plan:  His left hip is doing well postoperatively. He is having pain and swelling in both his left knee and ankle but not the rest of his leg. There is low concern for DVT. He does have severe left knee osteoarthritis and may be having a flare due to gout in the left ankle. I recommended a Medrol Dosepak for this and this was prescribed today. We will otherwise continue weightbearing as tolerated and activity as tolerated and follow-up in 4 weeks for repeat assessment. This dictation was done with Dragon dictation and may contain mechanical errors related to translation.

## 2023-04-17 ENCOUNTER — OFFICE VISIT (OUTPATIENT)
Dept: ORTHOPEDIC SURGERY | Age: 58
End: 2023-04-17

## 2023-04-17 DIAGNOSIS — Z96.642 STATUS POST TOTAL REPLACEMENT OF LEFT HIP: Primary | ICD-10-CM

## 2023-04-17 PROCEDURE — 99024 POSTOP FOLLOW-UP VISIT: CPT | Performed by: ORTHOPAEDIC SURGERY

## 2023-04-17 NOTE — PROGRESS NOTES
[facility]  Office Visit    Chief Complaint: Follow-up s/p left total hip arthroplasty    HPI:  James Shelby is a 61 y.o. who is here in follow-up of left total hip arthroplasty performed on March 1, 2023. He continues to recover postoperative. He is working with physical therapy and walks with use of a cane. He is off of narcotic pain medication. He does report stiffness and soreness in his low back, worse in the morning. The pain does not radiate.       Patient Active Problem List   Diagnosis    Staph skin infection    Microalbuminuria    Hyperglycemia    Polyuria    Failure of erection    Chronic kidney disease, stage II (mild)    Benign paroxysmal positional nystagmus    Erectile dysfunction    Vitamin D deficiency    Allergic rhinitis    Benign paroxysmal positional vertigo    Morbid obesity with BMI of 50.0-59.9, adult ([facility] [address]    HLD (hyperlipidemia)    Obstructive sleep apnea syndrome    Panic attack    Controlled type 2 diabetes mellitus without complication, without long-term current use of insulin ([facility])    Primary osteoarthritis of both knees    Complex tear of lateral meniscus of right knee as current injury    Complex tear of medial meniscus of right knee as current injury    Degenerative disc disease, cervical    Idiopathic chronic gout of multiple sites without tophus    Chronic GERD    Hemorrhoid    Numbness    Hand pain, right    Arthritis of carpometacarpal (CMC) joint of right thumb    Enlarged lymph node    Groin pain, left    Pneumonia due to COVID-19 virus    Primary osteoarthritis of left hip       ROS:  Constitutional: denies fever, chills, weight loss  MSK: denies pain in other joints, muscle aches  Neurological: denies numbness, tingling, weakness    Exam:  Appearance: sitting in exam room chair, appears to be in no acute distress, awake and alert  Resp: unlabored breathing on room air  Skin: warm, dry and intact with out erythema or significant increased

## 2023-04-24 ENCOUNTER — PATIENT MESSAGE (OUTPATIENT)
Dept: ORTHOPEDIC SURGERY | Age: 58
End: 2023-04-24

## 2023-04-24 RX ORDER — AMOXICILLIN 500 MG/1
CAPSULE ORAL
Qty: 4 CAPSULE | Refills: 1 | Status: SHIPPED | OUTPATIENT
Start: 2023-04-24

## 2023-05-09 RX ORDER — AMOXICILLIN 500 MG/1
CAPSULE ORAL
Qty: 4 CAPSULE | Refills: 1 | Status: SHIPPED | OUTPATIENT
Start: 2023-05-09

## 2023-05-10 ENCOUNTER — OFFICE VISIT (OUTPATIENT)
Dept: ENT CLINIC | Age: 58
End: 2023-05-10
Payer: COMMERCIAL

## 2023-05-10 VITALS
WEIGHT: 300 LBS | DIASTOLIC BLOOD PRESSURE: 91 MMHG | SYSTOLIC BLOOD PRESSURE: 135 MMHG | OXYGEN SATURATION: 96 % | BODY MASS INDEX: 45.47 KG/M2 | HEIGHT: 68 IN | TEMPERATURE: 97.9 F | HEART RATE: 90 BPM

## 2023-05-10 DIAGNOSIS — H61.23 BILATERAL IMPACTED CERUMEN: Primary | ICD-10-CM

## 2023-05-10 PROCEDURE — 69210 REMOVE IMPACTED EAR WAX UNI: CPT | Performed by: OTOLARYNGOLOGY

## 2023-05-10 NOTE — PROGRESS NOTES
[facility]  [address] [address] [facility], [facility] [address]  P: [phone]  F: [phone]       Patient     Rebecca Raymond  1965    ChiefComplaint     Chief Complaint   Patient presents with    Follow-up     Patient is here today for his 6 month cleaning of both ears       History of Present Illness     Rebecca Raymond is a pleasant 61 y.o. male here for routine ear cleaning    Past Medical History     Past Medical History:   Diagnosis Date    Arthritis     Chicken pox     Chronic GERD 07/19/2018    Congestion of upper airway     COVID-19 virus infection 11/22/2020    Dizziness     CORTES (generalized anxiety disorder)     Gout     Hip pain 02/2023    and groin    Hyperlipidemia     Obstructive sleep apnea syndrome 08/08/2016    doesn't use CPAP    Panic attacks     Pneumonia due to COVID-19 virus 12/04/2020    Rash     Vitamin D deficiency 08/03/2015       Past Surgical History     Past Surgical History:   Procedure Laterality Date    COLONOSCOPY  2017    Per pt' was nml.     HAND SURGERY      KNEE ARTHROSCOPY Right 09/14/2017    RIGHT KNEE ARTHROSCOPY WITH PARTIAL MEDIAL AND LATERAL MENISCECTOMY,CHONDROPLASTY,SYNOVECTOMY    KNEE SURGERY      left meniscus    MENISCECTOMY Left     TONSILLECTOMY      TOTAL HIP ARTHROPLASTY Left 3/1/2023    LEFT ANTERIOR TOTAL HIP REPLACEMENT WITH C-ARM performed by Reji Fernandes MD at [address] [place] [address] N/A 9/10/2018    EGD BIOPSY performed by Lakeisha Suero DO at [address] EXTRACTION         Family History     Family History   Problem Relation Age of Onset    Elevated Lipids Mother     High Cholesterol Mother     No Known Problems Father     No Known Problems Brother     No Known Problems Brother     No Known Problems Sister     No Known Problems Maternal Grandmother     No Known Problems Maternal Grandfather     No Known Problems Paternal Grandmother     No Known Problems Paternal Grandfather        Social

## 2023-06-05 ENCOUNTER — OFFICE VISIT (OUTPATIENT)
Dept: ORTHOPEDIC SURGERY | Age: 58
End: 2023-06-05

## 2023-06-05 VITALS — BODY MASS INDEX: 42.44 KG/M2 | HEIGHT: 68 IN | WEIGHT: 280 LBS

## 2023-06-05 DIAGNOSIS — Z96.642 STATUS POST TOTAL REPLACEMENT OF LEFT HIP: Primary | ICD-10-CM

## 2023-06-05 NOTE — PROGRESS NOTES
out erythema or significant increased temperature  Neuro: grossly intact both lower extremities. Intact sensation to light touch. Motor exam 4+ to 5/5 in all major motor groups. Left hip: Incision is healed. Examination demonstrates negative logroll and negative Stinchfield. There is brisk capillary refill. Strength is 5/5 in hamstrings, quads, hip flexors. Imaging:  AP pelvis, AP and frog-leg lateral views of the left hip were performed and interpreted today. Significant for total hip arthroplasty prosthesis in place with no signs of osteolysis, loosening, fracture, dislocation. He has right hip osteoarthritis. Assessment:  S/p left total hip arthroplasty    Plan:  He is recovering well postoperatively. He will continue working with physical therapy. We discussed with the patient today the use of antibiotic prophylaxis prior to any dental procedures. We discussed that the antibiotic prophylaxis would be used to help prevent periprosthetic joint infections. If they were not offered antibiotics by the physician performing the procedure, they should contact our office that we may prescribe them antibiotics. Follow-up on an annual basis or sooner if needed. Total time spent on today's encounter was at least 22 minutes. This time included reviewing prior notes, radiographs, and lab results when available, reviewing history obtained by medical assistant, performing history and physical exam, reviewing tests/radiographs with the patient, counseling the patient, ordering medications or tests, documentation in the electronic health record, and coordination of care. This dictation was done with Dragon dictation and may contain mechanical errors related to translation.

## 2023-09-07 ENCOUNTER — OFFICE VISIT (OUTPATIENT)
Dept: ORTHOPEDIC SURGERY | Age: 58
End: 2023-09-07
Payer: COMMERCIAL

## 2023-09-07 VITALS — BODY MASS INDEX: 42.44 KG/M2 | RESPIRATION RATE: 16 BRPM | WEIGHT: 280 LBS | HEIGHT: 68 IN

## 2023-09-07 DIAGNOSIS — M16.11 PRIMARY OSTEOARTHRITIS OF RIGHT HIP: Primary | ICD-10-CM

## 2023-09-07 DIAGNOSIS — Z96.642 HISTORY OF TOTAL HIP ARTHROPLASTY, LEFT: ICD-10-CM

## 2023-09-07 PROCEDURE — 99213 OFFICE O/P EST LOW 20 MIN: CPT | Performed by: ORTHOPAEDIC SURGERY

## 2023-09-07 PROCEDURE — 3017F COLORECTAL CA SCREEN DOC REV: CPT | Performed by: ORTHOPAEDIC SURGERY

## 2023-09-07 PROCEDURE — G8427 DOCREV CUR MEDS BY ELIG CLIN: HCPCS | Performed by: ORTHOPAEDIC SURGERY

## 2023-09-07 PROCEDURE — G8417 CALC BMI ABV UP PARAM F/U: HCPCS | Performed by: ORTHOPAEDIC SURGERY

## 2023-09-07 PROCEDURE — 1036F TOBACCO NON-USER: CPT | Performed by: ORTHOPAEDIC SURGERY

## 2023-09-07 NOTE — PROGRESS NOTES
patient the diagnosis in detail and answered all questions. The patient verbalized understanding of the plan as it has been described above and is in agreement. Plan for anterior right total hip arthroplasty. We did discuss that BMI over 40 is increased risk of complications including infection. Total time spent on today's encounter was at least 23 minutes. This time included reviewing prior notes, radiographs, and lab results when available, reviewing history obtained by medical assistant, performing history and physical exam, reviewing tests/radiographs with the patient, counseling the patient, ordering medications or tests, documentation in the electronic health record, and coordination of care. This dictation was done with [employer] dictation and may contain mechanical errors related to translation.

## 2023-09-11 ENCOUNTER — TELEPHONE (OUTPATIENT)
Dept: ORTHOPEDIC SURGERY | Age: 58
End: 2023-09-11

## 2023-09-11 NOTE — TELEPHONE ENCOUNTER
Surgery and/or Procedure Scheduling     Contact Name: Rand Chau"  Surgical/Procedure Request: Sx on Rt Hip  Patient Contact Number: [phone]    Patient call to get schedule for sx on his rt hip. He just need a call back regarding this matter. Please Advise.

## 2023-10-24 ENCOUNTER — CLINICAL DOCUMENTATION (OUTPATIENT)
Dept: ORTHOPEDIC SURGERY | Age: 58
End: 2023-10-24

## 2023-10-24 NOTE — PROGRESS NOTES
[facility]  Clinical Documentation    HPI:  Carolina Tellez is a 60 y.o. with a history of severe bilateral hip osteoarthritis. He successfully underwent left anterior total hip arthroplasty March 2023 and his left hip is doing very well. He also has severe degenerative changes of the right hip and continues to have pain on a daily basis. He underwent a steroid injection in the left hip in the past and this provided minimal to no relief of symptoms. For this reason, he is not interested in a right hip steroid injection. He was active in physical therapy for both hips after his left hip replacement 7 months ago. He has been doing home exercises as well. He has increasing pain in his right groin, lateral hip, and buttock. He has no history of injury or surgery to the right hip. He currently has right hip pain on a daily basis. He denies low back pain or radiating pain down the leg. The patient has difficulty putting on socks and shoes, difficulty getting out of a car, difficulty with ambulation and doing activities of daily living including pain at night. Pain at rest is 7 and with activities 9-10/10. He denies diabetes, tobacco use, history of blood clots. He does have help at home. BMI is 42. Imaging:  Prior right hip radiographs were reviewed today. These are significant for right hip arthritis with no remaining joint space. 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 There is a left total hip arthroplasty prosthesis in place. Assessment:  Right hip osteoarthritis    Plan:  He has severe right hip osteoarthritis. We discussed continued treatment with NSAIDs, steroid injection, total hip arthroplasty. Given his excellent pain relief on the contralateral side, I recommended total hip arthroplasty. We have discussed this procedure in the past.  Further physical therapy will not be beneficial given severe degenerative changes.  Plan for anterior right

## 2023-10-30 ENCOUNTER — TELEPHONE (OUTPATIENT)
Dept: ORTHOPEDICS UNIT | Age: 58
End: 2023-10-30

## 2023-10-30 NOTE — TELEPHONE ENCOUNTER
Date Of Surgery: 11/8/2023  Surgeon: Dr Jen White REPLACEMENT(S): Total Hip Replacement    DC Plan: Home    HOME ASSISTANCE - WHO WILL BE STAYING WITH YOU AT [address]? mother    DC TRANSPORTATION: mother    STEPS INTO HOME: 8    STEPS TO BATHROOM/BEDROOM:  Able to live on the main level. DME NEEDS:  Denies durable medical equipment needs at this time, already has a rolling walker. LENGTH OF STAY HAS BEEN DISCUSSED WITH THE PATIENT, APPROPRIATE TO HIS/ HER PROCEDURE. PATIENT HAS BEEN INFORMED THAT THEY WILL BE DISCHARGED WHEN THE PHYSICIAN DEEMS THEM MEDICALLY READY. MOST PATIENTS CAN EXPECT TO BE IN THE HOSPITAL ONE NIGHT OR 1-2 DAYS AS AN ADMISSION FOR THOSE WITH MEDICAL HEALTH ISSUES/COMPLICATIONS. HOME CARE CHOICE(S): Prefers to use [address] [place] [address]. SNF/REHAB CHOICES (S):     Declined a need for skilled nursing facility. MEDS TO BEDS FROM [facility]: Patient is agreeable to have medications filled via meds to beds program with [facility]. Facesheet with discharge needs provided to [address] / .

## 2023-10-30 NOTE — TELEPHONE ENCOUNTER
Attempted to contact patient to review discharge plans for upcoming procedure. Voicemail box has not been setup yet. Unable to leave a voicemail.

## 2023-10-30 NOTE — OP NOTE
Esophagogastroduodenoscopy     Indications: Pre-op gastric Surgery, history of / rule out Gastroesophageal reflux disease. Pre-operative Diagnosis: Obesity/pre-op bariatric surgery . Post-operative Diagnosis: Gastritis, duodenitis, Hiatal Hernia Hill Grade 1    Surgeon: Parvez Rosas    Anesthesia: MAC      Procedure Details   The patient was seen in the Holding Room. The risks, benefits, complications, treatment options, and expected outcomes were discussed with the patient. Pre-op Endoscopy recommended to rule any intragastric pathology that would interfere with proposed procedure /  And or due to current conditions. The possibilities of reaction to medication, pulmonary aspiration, perforation of viscus, bleeding, recurrent infection, the need for additional procedures, failure to diagnose a condition, and creating a complication requiring transfusion or operation were discussed with the patient. The patient concurred with the proposed plan, giving informed consent. The site of surgery properly noted/marked. The patient was taken to Endoscopy Suite, identified as Melba Wall and the procedure verified as  Esophagogastroduodenoscopy  A Time Out was held and the above information confirmed. Upper Endoscopy: An endoscope was inserted through the oropharynx into the upper esophagus. The endoscope was passed into the stomach to the level of the pylorus and passed to the duodenum where the ampulla was visualized and duodenum was normal. Then the scope was retracted back to the stomach and it was abnormal , biopsy of the antrum obtained, then retroflexed and the gastroesophageal junction was inspected.  There was a hiatal hernia and no evidence of Mendez's     Findings:  Esophageal findings include:  Upper: normal Esophageal Mucosa  Lower:normal Esophageal Mucosa  Distal: normal Esophageal Mucosa    Gastric findings include: abnormal Gastric Mucosa    Duodenal Findings: abnormal Duodenal Calm/Appropriate

## 2023-11-01 ENCOUNTER — TELEPHONE (OUTPATIENT)
Dept: ORTHOPEDIC SURGERY | Age: 58
End: 2023-11-01

## 2023-11-01 RX ORDER — ROSUVASTATIN CALCIUM 5 MG/1
5 TABLET, COATED ORAL DAILY
COMMUNITY

## 2023-11-01 RX ORDER — ERGOCALCIFEROL 1.25 MG/1
50000 CAPSULE ORAL WEEKLY
COMMUNITY

## 2023-11-03 ENCOUNTER — HOSPITAL ENCOUNTER (OUTPATIENT)
Age: 58
Discharge: HOME | End: 2023-11-03
Payer: COMMERCIAL

## 2023-11-03 ENCOUNTER — OFFICE VISIT (OUTPATIENT)
Dept: ORTHOPEDIC SURGERY | Age: 58
End: 2023-11-03
Payer: COMMERCIAL

## 2023-11-03 ENCOUNTER — TELEPHONE (OUTPATIENT)
Dept: ORTHOPEDIC SURGERY | Age: 58
End: 2023-11-03

## 2023-11-03 VITALS — RESPIRATION RATE: 16 BRPM | WEIGHT: 280 LBS | BODY MASS INDEX: 42.44 KG/M2 | HEIGHT: 68 IN

## 2023-11-03 DIAGNOSIS — M16.11 PRIMARY OSTEOARTHRITIS OF RIGHT HIP: Primary | ICD-10-CM

## 2023-11-03 DIAGNOSIS — M16.11 PRIMARY OSTEOARTHRITIS OF RIGHT HIP: ICD-10-CM

## 2023-11-03 LAB
25(OH)D3 SERPL-MCNC: 54.1 NG/ML
ABO + RH BLD: NORMAL
ALBUMIN SERPL-MCNC: 4 G/DL (ref 3.4–5)
BLD GP AB SCN SERPL QL: NORMAL
EST. AVERAGE GLUCOSE BLD GHB EST-MCNC: 114 MG/DL
HBA1C MFR BLD: 5.6 %
MRSA DNA SPEC QL NAA+PROBE: NORMAL
PREALB SERPL-MCNC: 19.5 MG/DL (ref 20–40)

## 2023-11-03 PROCEDURE — 87641 MR-STAPH DNA AMP PROBE: CPT

## 2023-11-03 PROCEDURE — 3017F COLORECTAL CA SCREEN DOC REV: CPT | Performed by: ORTHOPAEDIC SURGERY

## 2023-11-03 PROCEDURE — 36415 COLL VENOUS BLD VENIPUNCTURE: CPT

## 2023-11-03 PROCEDURE — 86850 RBC ANTIBODY SCREEN: CPT

## 2023-11-03 PROCEDURE — G8427 DOCREV CUR MEDS BY ELIG CLIN: HCPCS | Performed by: ORTHOPAEDIC SURGERY

## 2023-11-03 PROCEDURE — 86901 BLOOD TYPING SEROLOGIC RH(D): CPT

## 2023-11-03 PROCEDURE — G8484 FLU IMMUNIZE NO ADMIN: HCPCS | Performed by: ORTHOPAEDIC SURGERY

## 2023-11-03 PROCEDURE — 83036 HEMOGLOBIN GLYCOSYLATED A1C: CPT

## 2023-11-03 PROCEDURE — 99213 OFFICE O/P EST LOW 20 MIN: CPT | Performed by: ORTHOPAEDIC SURGERY

## 2023-11-03 PROCEDURE — G8417 CALC BMI ABV UP PARAM F/U: HCPCS | Performed by: ORTHOPAEDIC SURGERY

## 2023-11-03 PROCEDURE — 1036F TOBACCO NON-USER: CPT | Performed by: ORTHOPAEDIC SURGERY

## 2023-11-03 PROCEDURE — 86900 BLOOD TYPING SEROLOGIC ABO: CPT

## 2023-11-03 PROCEDURE — 84134 ASSAY OF PREALBUMIN: CPT

## 2023-11-03 PROCEDURE — 82040 ASSAY OF SERUM ALBUMIN: CPT

## 2023-11-03 PROCEDURE — 82306 VITAMIN D 25 HYDROXY: CPT

## 2023-11-03 NOTE — TELEPHONE ENCOUNTER
General Question     Subject: Needs to know where to do the blood work.  Patient: Jm Francisco \"King\"  Contact Number: [phone]

## 2023-11-03 NOTE — PROGRESS NOTES
[address] [facility]  Office Visit    Chief Complaint: Right hip pain    HPI:  Dg Spencer is a 60 y.o. who is here in follow-up of right hip pain. He is scheduled for right total hip arthroplasty next week. For review, he has a history significant for severe left hip arthritis for which he underwent anterior left total hip arthroplasty March 2023. His left hip is doing very well. However, he has increasing pain in his right groin, lateral hip, and buttock. He has no history of injury, surgery, injection on the right. He currently has right hip pain on a daily basis. He denies low back pain or radiating pain down the leg. The patient has difficulty putting on socks and shoes, difficulty getting out of a car, difficulty with ambulation and doing activities of daily living including pain at night. Pain at rest is 7 and with activities 9-10/10. He denies diabetes, tobacco use, history of blood clots. He does have help at home. BMI is 42.       Patient Active Problem List   Diagnosis    Staph skin infection    Microalbuminuria    Hyperglycemia    Polyuria    Failure of erection    Chronic kidney disease, stage II (mild)    Benign paroxysmal positional nystagmus    Erectile dysfunction    Vitamin D deficiency    Allergic rhinitis    Benign paroxysmal positional vertigo    Morbid obesity with BMI of 50.0-59.9, adult ([facility])    HLD (hyperlipidemia)    Obstructive sleep apnea syndrome    Panic attack    Controlled type 2 diabetes mellitus without complication, without long-term current use of insulin ([facility])    Primary osteoarthritis of both knees    Complex tear of lateral meniscus of right knee as current injury    Complex tear of medial meniscus of right knee as current injury    Degenerative disc disease, cervical    Idiopathic chronic gout of multiple sites without tophus    Chronic GERD    Hemorrhoid    Numbness    Hand pain, right    Arthritis of carpometacarpal (CMC) joint of right

## 2023-11-07 ENCOUNTER — ANESTHESIA EVENT (OUTPATIENT)
Dept: OPERATING ROOM | Age: 58
End: 2023-11-07
Payer: COMMERCIAL

## 2023-11-08 ENCOUNTER — HOSPITAL ENCOUNTER (OUTPATIENT)
Age: 58
Setting detail: OBSERVATION
Discharge: HOME | End: 2023-11-09
Attending: ORTHOPAEDIC SURGERY | Admitting: ORTHOPAEDIC SURGERY
Payer: COMMERCIAL

## 2023-11-08 ENCOUNTER — ANESTHESIA (OUTPATIENT)
Dept: OPERATING ROOM | Age: 58
End: 2023-11-08
Payer: COMMERCIAL

## 2023-11-08 ENCOUNTER — APPOINTMENT (OUTPATIENT)
Dept: GENERAL RADIOLOGY | Age: 58
End: 2023-11-08
Attending: ORTHOPAEDIC SURGERY
Payer: COMMERCIAL

## 2023-11-08 DIAGNOSIS — M16.11 PRIMARY OSTEOARTHRITIS OF RIGHT HIP: Primary | ICD-10-CM

## 2023-11-08 LAB
ABO + RH BLD: NORMAL
BLD GP AB SCN SERPL QL: NORMAL
GLUCOSE BLD-MCNC: 138 MG/DL (ref 70–99)
GLUCOSE BLD-MCNC: 156 MG/DL (ref 70–99)
GLUCOSE BLD-MCNC: 95 MG/DL (ref 70–99)
PERFORMED ON: ABNORMAL
PERFORMED ON: ABNORMAL
PERFORMED ON: NORMAL

## 2023-11-08 PROCEDURE — 2580000003 HC RX 258: Performed by: ORTHOPAEDIC SURGERY

## 2023-11-08 PROCEDURE — 73501 X-RAY EXAM HIP UNI 1 VIEW: CPT

## 2023-11-08 PROCEDURE — 6360000002 HC RX W HCPCS: Performed by: ORTHOPAEDIC SURGERY

## 2023-11-08 PROCEDURE — 2500000003 HC RX 250 WO HCPCS

## 2023-11-08 PROCEDURE — 2580000003 HC RX 258: Performed by: ANESTHESIOLOGY

## 2023-11-08 PROCEDURE — 3600000015 HC SURGERY LEVEL 5 ADDTL 15MIN: Performed by: ORTHOPAEDIC SURGERY

## 2023-11-08 PROCEDURE — G0378 HOSPITAL OBSERVATION PER HR: HCPCS

## 2023-11-08 PROCEDURE — A4217 STERILE WATER/SALINE, 500 ML: HCPCS | Performed by: ORTHOPAEDIC SURGERY

## 2023-11-08 PROCEDURE — 3700000000 HC ANESTHESIA ATTENDED CARE: Performed by: ORTHOPAEDIC SURGERY

## 2023-11-08 PROCEDURE — 3700000001 HC ADD 15 MINUTES (ANESTHESIA): Performed by: ORTHOPAEDIC SURGERY

## 2023-11-08 PROCEDURE — 86901 BLOOD TYPING SEROLOGIC RH(D): CPT

## 2023-11-08 PROCEDURE — 86850 RBC ANTIBODY SCREEN: CPT

## 2023-11-08 PROCEDURE — 3600000005 HC SURGERY LEVEL 5 BASE: Performed by: ORTHOPAEDIC SURGERY

## 2023-11-08 PROCEDURE — 7100000000 HC PACU RECOVERY - FIRST 15 MIN: Performed by: ORTHOPAEDIC SURGERY

## 2023-11-08 PROCEDURE — 9990000010 HC NO CHARGE VISIT

## 2023-11-08 PROCEDURE — 86900 BLOOD TYPING SEROLOGIC ABO: CPT

## 2023-11-08 PROCEDURE — 6370000000 HC RX 637 (ALT 250 FOR IP): Performed by: ORTHOPAEDIC SURGERY

## 2023-11-08 PROCEDURE — 2709999900 HC NON-CHARGEABLE SUPPLY: Performed by: ORTHOPAEDIC SURGERY

## 2023-11-08 PROCEDURE — 6360000002 HC RX W HCPCS: Performed by: ANESTHESIOLOGY

## 2023-11-08 PROCEDURE — C1776 JOINT DEVICE (IMPLANTABLE): HCPCS | Performed by: ORTHOPAEDIC SURGERY

## 2023-11-08 PROCEDURE — 6360000002 HC RX W HCPCS

## 2023-11-08 PROCEDURE — 7100000001 HC PACU RECOVERY - ADDTL 15 MIN: Performed by: ORTHOPAEDIC SURGERY

## 2023-11-08 PROCEDURE — 2720000010 HC SURG SUPPLY STERILE: Performed by: ORTHOPAEDIC SURGERY

## 2023-11-08 DEVICE — STEM FEM SZ 7 HIP HI OFFSET CLLRD CEMENTLESS 12/14 TAPR: Type: IMPLANTABLE DEVICE | Site: HIP | Status: FUNCTIONAL

## 2023-11-08 DEVICE — CUP ACET DIA58MM HIP GRIPTION PRI CEMENTLESS FIX SECT SER: Type: IMPLANTABLE DEVICE | Site: HIP | Status: FUNCTIONAL

## 2023-11-08 DEVICE — HEAD FEM DIA40MM +1.5MM OFFSET 12/14 TAPR HIP CERAMIC TI SL: Type: IMPLANTABLE DEVICE | Site: HIP | Status: FUNCTIONAL

## 2023-11-08 DEVICE — LINER ACET OD58MM ID40MM +4MM OFFSET HIP POLYETH MTL ON: Type: IMPLANTABLE DEVICE | Site: HIP | Status: FUNCTIONAL

## 2023-11-08 RX ORDER — SODIUM CHLORIDE 0.9 % (FLUSH) 0.9 %
5-40 SYRINGE (ML) INJECTION PRN
Status: DISCONTINUED | OUTPATIENT
Start: 2023-11-08 | End: 2023-11-08 | Stop reason: HOSPADM

## 2023-11-08 RX ORDER — SODIUM CHLORIDE 9 MG/ML
INJECTION, SOLUTION INTRAVENOUS PRN
Status: DISCONTINUED | OUTPATIENT
Start: 2023-11-08 | End: 2023-11-09 | Stop reason: HOSPADM

## 2023-11-08 RX ORDER — CALCIUM CARBONATE 500 MG/1
500 TABLET, CHEWABLE ORAL 3 TIMES DAILY PRN
Status: DISCONTINUED | OUTPATIENT
Start: 2023-11-08 | End: 2023-11-09 | Stop reason: HOSPADM

## 2023-11-08 RX ORDER — FENTANYL CITRATE 0.05 MG/ML
25 INJECTION, SOLUTION INTRAMUSCULAR; INTRAVENOUS EVERY 5 MIN PRN
Status: DISCONTINUED | OUTPATIENT
Start: 2023-11-08 | End: 2023-11-08 | Stop reason: HOSPADM

## 2023-11-08 RX ORDER — MIDAZOLAM HYDROCHLORIDE 1 MG/ML
INJECTION INTRAMUSCULAR; INTRAVENOUS PRN
Status: DISCONTINUED | OUTPATIENT
Start: 2023-11-08 | End: 2023-11-08 | Stop reason: SDUPTHER

## 2023-11-08 RX ORDER — GLYCOPYRROLATE 0.2 MG/ML
INJECTION INTRAMUSCULAR; INTRAVENOUS PRN
Status: DISCONTINUED | OUTPATIENT
Start: 2023-11-08 | End: 2023-11-08 | Stop reason: SDUPTHER

## 2023-11-08 RX ORDER — PROPOFOL 10 MG/ML
INJECTION, EMULSION INTRAVENOUS PRN
Status: DISCONTINUED | OUTPATIENT
Start: 2023-11-08 | End: 2023-11-08 | Stop reason: SDUPTHER

## 2023-11-08 RX ORDER — INSULIN LISPRO 100 [IU]/ML
0.08 INJECTION, SOLUTION INTRAVENOUS; SUBCUTANEOUS
Status: DISCONTINUED | OUTPATIENT
Start: 2023-11-08 | End: 2023-11-09

## 2023-11-08 RX ORDER — ONDANSETRON 2 MG/ML
4 INJECTION INTRAMUSCULAR; INTRAVENOUS EVERY 6 HOURS PRN
Status: DISCONTINUED | OUTPATIENT
Start: 2023-11-08 | End: 2023-11-09 | Stop reason: HOSPADM

## 2023-11-08 RX ORDER — OXYCODONE HYDROCHLORIDE 10 MG/1
10 TABLET ORAL ONCE
Status: COMPLETED | OUTPATIENT
Start: 2023-11-08 | End: 2023-11-08

## 2023-11-08 RX ORDER — PREGABALIN 75 MG/1
75 CAPSULE ORAL 2 TIMES DAILY
Qty: 28 CAPSULE | Refills: 0 | Status: SHIPPED | OUTPATIENT
Start: 2023-11-08 | End: 2023-11-22

## 2023-11-08 RX ORDER — OXYCODONE HYDROCHLORIDE 10 MG/1
10 TABLET ORAL EVERY 4 HOURS PRN
Status: DISCONTINUED | OUTPATIENT
Start: 2023-11-08 | End: 2023-11-09 | Stop reason: HOSPADM

## 2023-11-08 RX ORDER — ONDANSETRON 2 MG/ML
4 INJECTION INTRAMUSCULAR; INTRAVENOUS
Status: DISCONTINUED | OUTPATIENT
Start: 2023-11-08 | End: 2023-11-08 | Stop reason: HOSPADM

## 2023-11-08 RX ORDER — ROSUVASTATIN CALCIUM 10 MG/1
5 TABLET, COATED ORAL DAILY
Status: DISCONTINUED | OUTPATIENT
Start: 2023-11-09 | End: 2023-11-09 | Stop reason: HOSPADM

## 2023-11-08 RX ORDER — ROCURONIUM BROMIDE 10 MG/ML
INJECTION, SOLUTION INTRAVENOUS PRN
Status: DISCONTINUED | OUTPATIENT
Start: 2023-11-08 | End: 2023-11-08 | Stop reason: SDUPTHER

## 2023-11-08 RX ORDER — SODIUM CHLORIDE 9 MG/ML
INJECTION, SOLUTION INTRAVENOUS PRN
Status: DISCONTINUED | OUTPATIENT
Start: 2023-11-08 | End: 2023-11-08 | Stop reason: HOSPADM

## 2023-11-08 RX ORDER — SODIUM CHLORIDE 0.9 % (FLUSH) 0.9 %
5-40 SYRINGE (ML) INJECTION EVERY 12 HOURS SCHEDULED
Status: DISCONTINUED | OUTPATIENT
Start: 2023-11-08 | End: 2023-11-08 | Stop reason: HOSPADM

## 2023-11-08 RX ORDER — MORPHINE SULFATE 4 MG/ML
4 INJECTION, SOLUTION INTRAMUSCULAR; INTRAVENOUS
Status: DISCONTINUED | OUTPATIENT
Start: 2023-11-08 | End: 2023-11-09 | Stop reason: HOSPADM

## 2023-11-08 RX ORDER — SODIUM CHLORIDE 0.9 % (FLUSH) 0.9 %
5-40 SYRINGE (ML) INJECTION EVERY 12 HOURS SCHEDULED
Status: DISCONTINUED | OUTPATIENT
Start: 2023-11-08 | End: 2023-11-09 | Stop reason: HOSPADM

## 2023-11-08 RX ORDER — TRANEXAMIC ACID 650 MG/1
1950 TABLET ORAL ONCE
Status: COMPLETED | OUTPATIENT
Start: 2023-11-08 | End: 2023-11-08

## 2023-11-08 RX ORDER — KETOROLAC TROMETHAMINE 15 MG/ML
15 INJECTION, SOLUTION INTRAMUSCULAR; INTRAVENOUS
Status: DISCONTINUED | OUTPATIENT
Start: 2023-11-08 | End: 2023-11-08 | Stop reason: SDUPTHER

## 2023-11-08 RX ORDER — INSULIN GLARGINE 100 [IU]/ML
0.25 INJECTION, SOLUTION SUBCUTANEOUS NIGHTLY
Status: DISCONTINUED | OUTPATIENT
Start: 2023-11-08 | End: 2023-11-08

## 2023-11-08 RX ORDER — PREGABALIN 75 MG/1
75 CAPSULE ORAL 2 TIMES DAILY
Status: DISCONTINUED | OUTPATIENT
Start: 2023-11-08 | End: 2023-11-09 | Stop reason: HOSPADM

## 2023-11-08 RX ORDER — OXYCODONE HYDROCHLORIDE 5 MG/1
5-10 TABLET ORAL EVERY 6 HOURS PRN
Qty: 40 TABLET | Refills: 0 | Status: SHIPPED | OUTPATIENT
Start: 2023-11-08 | End: 2023-11-13

## 2023-11-08 RX ORDER — MORPHINE SULFATE 2 MG/ML
2 INJECTION, SOLUTION INTRAMUSCULAR; INTRAVENOUS
Status: DISCONTINUED | OUTPATIENT
Start: 2023-11-08 | End: 2023-11-09 | Stop reason: HOSPADM

## 2023-11-08 RX ORDER — OXYCODONE HYDROCHLORIDE 5 MG/1
5 TABLET ORAL EVERY 4 HOURS PRN
Status: DISCONTINUED | OUTPATIENT
Start: 2023-11-08 | End: 2023-11-09 | Stop reason: HOSPADM

## 2023-11-08 RX ORDER — INSULIN LISPRO 100 [IU]/ML
0-4 INJECTION, SOLUTION INTRAVENOUS; SUBCUTANEOUS NIGHTLY
Status: DISCONTINUED | OUTPATIENT
Start: 2023-11-08 | End: 2023-11-09 | Stop reason: HOSPADM

## 2023-11-08 RX ORDER — SODIUM CHLORIDE 450 MG/100ML
INJECTION, SOLUTION INTRAVENOUS CONTINUOUS
Status: DISCONTINUED | OUTPATIENT
Start: 2023-11-08 | End: 2023-11-09 | Stop reason: HOSPADM

## 2023-11-08 RX ORDER — SODIUM CHLORIDE 0.9 % (FLUSH) 0.9 %
5-40 SYRINGE (ML) INJECTION PRN
Status: DISCONTINUED | OUTPATIENT
Start: 2023-11-08 | End: 2023-11-09 | Stop reason: HOSPADM

## 2023-11-08 RX ORDER — DEXAMETHASONE SODIUM PHOSPHATE 4 MG/ML
INJECTION, SOLUTION INTRA-ARTICULAR; INTRALESIONAL; INTRAMUSCULAR; INTRAVENOUS; SOFT TISSUE PRN
Status: DISCONTINUED | OUTPATIENT
Start: 2023-11-08 | End: 2023-11-08 | Stop reason: SDUPTHER

## 2023-11-08 RX ORDER — MELOXICAM 7.5 MG/1
7.5 TABLET ORAL DAILY
Status: CANCELLED | OUTPATIENT
Start: 2023-11-09

## 2023-11-08 RX ORDER — ONDANSETRON 2 MG/ML
INJECTION INTRAMUSCULAR; INTRAVENOUS PRN
Status: DISCONTINUED | OUTPATIENT
Start: 2023-11-08 | End: 2023-11-08 | Stop reason: SDUPTHER

## 2023-11-08 RX ORDER — MELOXICAM 7.5 MG/1
7.5 TABLET ORAL ONCE
Status: COMPLETED | OUTPATIENT
Start: 2023-11-08 | End: 2023-11-08

## 2023-11-08 RX ORDER — INSULIN LISPRO 100 [IU]/ML
0-4 INJECTION, SOLUTION INTRAVENOUS; SUBCUTANEOUS
Status: DISCONTINUED | OUTPATIENT
Start: 2023-11-08 | End: 2023-11-09 | Stop reason: HOSPADM

## 2023-11-08 RX ORDER — ONDANSETRON 4 MG/1
4 TABLET, ORALLY DISINTEGRATING ORAL EVERY 8 HOURS PRN
Status: DISCONTINUED | OUTPATIENT
Start: 2023-11-08 | End: 2023-11-09 | Stop reason: HOSPADM

## 2023-11-08 RX ORDER — ACETAMINOPHEN 325 MG/1
650 TABLET ORAL EVERY 6 HOURS
Status: DISCONTINUED | OUTPATIENT
Start: 2023-11-08 | End: 2023-11-09 | Stop reason: HOSPADM

## 2023-11-08 RX ORDER — SUCCINYLCHOLINE/SOD CL,ISO/PF 200MG/10ML
SYRINGE (ML) INTRAVENOUS PRN
Status: DISCONTINUED | OUTPATIENT
Start: 2023-11-08 | End: 2023-11-08 | Stop reason: SDUPTHER

## 2023-11-08 RX ORDER — FENTANYL CITRATE 50 UG/ML
INJECTION, SOLUTION INTRAMUSCULAR; INTRAVENOUS PRN
Status: DISCONTINUED | OUTPATIENT
Start: 2023-11-08 | End: 2023-11-08 | Stop reason: SDUPTHER

## 2023-11-08 RX ORDER — KETOROLAC TROMETHAMINE 15 MG/ML
15 INJECTION, SOLUTION INTRAMUSCULAR; INTRAVENOUS
Status: DISCONTINUED | OUTPATIENT
Start: 2023-11-08 | End: 2023-11-09 | Stop reason: HOSPADM

## 2023-11-08 RX ORDER — DEXTROSE MONOHYDRATE 100 MG/ML
INJECTION, SOLUTION INTRAVENOUS CONTINUOUS PRN
Status: DISCONTINUED | OUTPATIENT
Start: 2023-11-08 | End: 2023-11-09 | Stop reason: HOSPADM

## 2023-11-08 RX ORDER — LIDOCAINE HYDROCHLORIDE 20 MG/ML
INJECTION, SOLUTION EPIDURAL; INFILTRATION; INTRACAUDAL; PERINEURAL PRN
Status: DISCONTINUED | OUTPATIENT
Start: 2023-11-08 | End: 2023-11-08 | Stop reason: SDUPTHER

## 2023-11-08 RX ORDER — PHENYLEPHRINE HCL IN 0.9% NACL 1 MG/10 ML
SYRINGE (ML) INTRAVENOUS PRN
Status: DISCONTINUED | OUTPATIENT
Start: 2023-11-08 | End: 2023-11-08 | Stop reason: SDUPTHER

## 2023-11-08 RX ADMIN — HYDROMORPHONE HYDROCHLORIDE 0.5 MG: 1 INJECTION, SOLUTION INTRAMUSCULAR; INTRAVENOUS; SUBCUTANEOUS at 13:54

## 2023-11-08 RX ADMIN — SODIUM CHLORIDE: 4.5 INJECTION, SOLUTION INTRAVENOUS at 20:41

## 2023-11-08 RX ADMIN — Medication 200 MCG: at 11:48

## 2023-11-08 RX ADMIN — ROCURONIUM BROMIDE 20 MG: 10 INJECTION INTRAVENOUS at 12:24

## 2023-11-08 RX ADMIN — ONDANSETRON 4 MG: 2 INJECTION INTRAMUSCULAR; INTRAVENOUS at 11:39

## 2023-11-08 RX ADMIN — PREGABALIN 75 MG: 75 CAPSULE ORAL at 20:44

## 2023-11-08 RX ADMIN — ROCURONIUM BROMIDE 20 MG: 10 INJECTION INTRAVENOUS at 12:00

## 2023-11-08 RX ADMIN — SODIUM CHLORIDE 3000 MG: 900 INJECTION INTRAVENOUS at 20:44

## 2023-11-08 RX ADMIN — HYDROMORPHONE HYDROCHLORIDE 0.5 MG: 1 INJECTION, SOLUTION INTRAMUSCULAR; INTRAVENOUS; SUBCUTANEOUS at 13:42

## 2023-11-08 RX ADMIN — PROPOFOL 300 MG: 10 INJECTION, EMULSION INTRAVENOUS at 11:33

## 2023-11-08 RX ADMIN — SODIUM CHLORIDE: 9 INJECTION, SOLUTION INTRAVENOUS at 09:27

## 2023-11-08 RX ADMIN — MIDAZOLAM 2 MG: 1 INJECTION INTRAMUSCULAR; INTRAVENOUS at 11:29

## 2023-11-08 RX ADMIN — MELOXICAM 7.5 MG: 7.5 TABLET ORAL at 09:35

## 2023-11-08 RX ADMIN — LIDOCAINE HYDROCHLORIDE 100 MG: 20 INJECTION, SOLUTION EPIDURAL; INFILTRATION; INTRACAUDAL; PERINEURAL at 11:32

## 2023-11-08 RX ADMIN — FENTANYL CITRATE 100 MCG: 50 INJECTION INTRAMUSCULAR; INTRAVENOUS at 11:32

## 2023-11-08 RX ADMIN — HYDROMORPHONE HYDROCHLORIDE 0.5 MG: 1 INJECTION, SOLUTION INTRAMUSCULAR; INTRAVENOUS; SUBCUTANEOUS at 13:11

## 2023-11-08 RX ADMIN — DEXAMETHASONE SODIUM PHOSPHATE 10 MG: 4 INJECTION, SOLUTION INTRAMUSCULAR; INTRAVENOUS at 11:39

## 2023-11-08 RX ADMIN — SUGAMMADEX 50 MG: 100 INJECTION, SOLUTION INTRAVENOUS at 12:50

## 2023-11-08 RX ADMIN — Medication 200 MG: at 11:34

## 2023-11-08 RX ADMIN — Medication 100 MCG: at 12:39

## 2023-11-08 RX ADMIN — SODIUM CHLORIDE 3000 MG: 900 INJECTION INTRAVENOUS at 11:40

## 2023-11-08 RX ADMIN — SODIUM CHLORIDE: 4.5 INJECTION, SOLUTION INTRAVENOUS at 17:45

## 2023-11-08 RX ADMIN — SODIUM CHLORIDE, PRESERVATIVE FREE 10 ML: 5 INJECTION INTRAVENOUS at 20:46

## 2023-11-08 RX ADMIN — ROCURONIUM BROMIDE 50 MG: 10 INJECTION INTRAVENOUS at 11:41

## 2023-11-08 RX ADMIN — Medication 100 MCG: at 11:51

## 2023-11-08 RX ADMIN — Medication 100 MCG: at 12:46

## 2023-11-08 RX ADMIN — SUGAMMADEX 100 MG: 100 INJECTION, SOLUTION INTRAVENOUS at 12:48

## 2023-11-08 RX ADMIN — ACETAMINOPHEN 650 MG: 325 TABLET ORAL at 17:45

## 2023-11-08 RX ADMIN — TRANEXAMIC ACID 1950 MG: 650 TABLET, FILM COATED ORAL at 09:35

## 2023-11-08 RX ADMIN — GLYCOPYRROLATE 0.2 MG: 0.2 INJECTION INTRAMUSCULAR; INTRAVENOUS at 11:39

## 2023-11-08 RX ADMIN — SUGAMMADEX 100 MG: 100 INJECTION, SOLUTION INTRAVENOUS at 12:47

## 2023-11-08 RX ADMIN — SODIUM CHLORIDE: 9 INJECTION, SOLUTION INTRAVENOUS at 12:30

## 2023-11-08 RX ADMIN — Medication 100 MCG: at 12:22

## 2023-11-08 RX ADMIN — Medication 100 MCG: at 12:53

## 2023-11-08 RX ADMIN — OXYCODONE HYDROCHLORIDE 10 MG: 10 TABLET ORAL at 09:35

## 2023-11-08 RX ADMIN — Medication 150 MCG: at 12:03

## 2023-11-08 RX ADMIN — Medication 50 MCG: at 12:12

## 2023-11-08 RX ADMIN — SUGAMMADEX 50 MG: 100 INJECTION, SOLUTION INTRAVENOUS at 12:46

## 2023-11-08 ASSESSMENT — PAIN DESCRIPTION - LOCATION
LOCATION: HIP

## 2023-11-08 ASSESSMENT — PAIN DESCRIPTION - PAIN TYPE
TYPE: SURGICAL PAIN

## 2023-11-08 ASSESSMENT — PAIN SCALES - GENERAL
PAINLEVEL_OUTOF10: 4
PAINLEVEL_OUTOF10: 7
PAINLEVEL_OUTOF10: 6
PAINLEVEL_OUTOF10: 7
PAINLEVEL_OUTOF10: 0

## 2023-11-08 ASSESSMENT — PAIN DESCRIPTION - ORIENTATION
ORIENTATION: RIGHT

## 2023-11-08 ASSESSMENT — PAIN DESCRIPTION - DESCRIPTORS
DESCRIPTORS: SHARP
DESCRIPTORS: SHARP

## 2023-11-08 ASSESSMENT — PAIN DESCRIPTION - FREQUENCY
FREQUENCY: CONTINUOUS

## 2023-11-08 ASSESSMENT — PAIN DESCRIPTION - ONSET
ONSET: ON-GOING

## 2023-11-08 ASSESSMENT — PAIN - FUNCTIONAL ASSESSMENT
PAIN_FUNCTIONAL_ASSESSMENT: PREVENTS OR INTERFERES SOME ACTIVE ACTIVITIES AND ADLS
PAIN_FUNCTIONAL_ASSESSMENT: PREVENTS OR INTERFERES SOME ACTIVE ACTIVITIES AND ADLS
PAIN_FUNCTIONAL_ASSESSMENT: 0-10

## 2023-11-08 NOTE — OP NOTE
Patient: Santana Bailon  YOB: 1965  MRN: [id number]    Date of Procedure: 11/8/2023      Pre-Op Diagnosis: Osteoarthritis, right hip     Post-Op Diagnosis: Same       Procedure Performed: Right anterior total hip arthroplasty     Surgeon: Rudolph Ortiz MD     Anesthesia: General     Estimated Blood Loss: 524 mL      Complications: None    Implants:  Depuy [place] Gription cup, 58 mm  40 mm polyethylene liner, +4 mm offset  Depuy Actis stem, size 7 high offset  40 mm diameter ceramic femoral head, +1.5 mm offset    Indications: This is a 60 y.o. male with osteoarthritis of the hip that continues to be painful despite conservative management. We discussed the diagnosis and treatment options and I recommended total hip arthroplasty. The operative procedure, alternatives, and risks were discussed in detail with the patient. The risks include but are not limited to: Infection, vessel injury, nerve injury, DVT, pulmonary embolism, implant loosening, need for revision surgery, leg length discrepancy, dislocation, lateral femoral cutaneous nerve palsy, intraoperative fracture. Informed consent for surgery was signed by the patient. Details: The patient was seen in the preoperative holding area where the site of surgery was marked and informed consent was confirmed. The patient was brought back to the operating room by OR personnel. Anesthesia was administered. The patient was positioned supine on the Dennis table. The right lower extremity was then prepped and draped in a standard and sterile fashion. A final and formal timeout was then performed which confirmed the correct patient, correct position, and correct site of surgery. IV antibiotics were administered within 1 hour of the skin incision. A direct anterior supine approach was utilized. The skin and subcutaneous tissue were dissected down to the body of the tensor fascia matthieu.  Incision into the fascia of the TFL was made and dissection was

## 2023-11-08 NOTE — ANESTHESIA POSTPROCEDURE EVALUATION
Department of Anesthesiology  Postprocedure Note    Patient: Santana Bailon  MRN: [id number]  YOB: 1965  Date of evaluation: 11/8/2023      Procedure Summary     Date: 11/08/23 Room / Location: [address]    Anesthesia Start: 1129 Anesthesia Stop: 1237    Procedure: RIGHT ANTERIOR TOTAL HIP REPLACEMENT WITH C-ARM (Right: Hip) Diagnosis:       Arthritis of right hip      (Arthritis of right hip [M16.11])    Surgeons: Rudolph Ortiz MD Responsible Provider: Maura Amaral MD    Anesthesia Type: general ASA Status: 3          Anesthesia Type: No value filed. Lourdes Phase I: Lourdes Score: 10    Lourdes Phase II:        Anesthesia Post Evaluation    Patient location during evaluation: PACU  Patient participation: complete - patient participated  Level of consciousness: awake  Airway patency: patent  Nausea & Vomiting: no nausea and no vomiting  Cardiovascular status: blood pressure returned to baseline  Respiratory status: acceptable  Hydration status: stable  Comments: Vital signs stable  OK to discharge from Stage I post anesthesia care.   Care transferred from Anesthesiology department on discharge from perioperative area   Multimodal analgesia pain management approach  Pain management: satisfactory to patient

## 2023-11-08 NOTE — ANESTHESIA PRE PROCEDURE
DM.    (-) blood dyscrasia               Abdominal:   (+) obese          Vascular:   + PVD, aortic or cerebral (Hx JUDIT). Other Findings:             Anesthesia Plan      general     ASA 3       Induction: intravenous. MIPS: Postoperative opioids intended and Prophylactic antiemetics administered. Anesthetic plan and risks discussed with patient. Plan discussed with CRNA. This pre-anesthesia assessment may be used as a history and physical.    DOS STAFF ADDENDUM:    Pt seen and examined, chart reviewed (including anesthesia, drug and allergy history). No interval changes to history and physical examination. Anesthetic plan, risks, benefits, alternatives, and personnel involved discussed with patient. Questions and concerns addressed. Patient(family) verbalized an understanding and agrees to proceed.       Harris Cazares MD  November 8, 2023  9:29 AM

## 2023-11-08 NOTE — H&P
Update History & Physical    The patient's History and Physical of October 31, 2023 was reviewed with the patient and I examined the patient. There was no change. The surgical site was confirmed by the patient and me. Plan: The risks, benefits, expected outcome, and alternative to the recommended procedure have been discussed with the patient. Patient understands and wants to proceed with the procedure.      Electronically signed by Cyndi Lamb MD on 11/8/2023 at 11:34 AM

## 2023-11-09 VITALS
HEART RATE: 100 BPM | RESPIRATION RATE: 16 BRPM | BODY MASS INDEX: 46.78 KG/M2 | TEMPERATURE: 97.8 F | DIASTOLIC BLOOD PRESSURE: 78 MMHG | SYSTOLIC BLOOD PRESSURE: 122 MMHG | WEIGHT: 308.64 LBS | OXYGEN SATURATION: 95 % | HEIGHT: 68 IN

## 2023-11-09 LAB
GLUCOSE BLD-MCNC: 105 MG/DL (ref 70–99)
PERFORMED ON: ABNORMAL

## 2023-11-09 PROCEDURE — 94760 N-INVAS EAR/PLS OXIMETRY 1: CPT

## 2023-11-09 PROCEDURE — 97161 PT EVAL LOW COMPLEX 20 MIN: CPT

## 2023-11-09 PROCEDURE — 96361 HYDRATE IV INFUSION ADD-ON: CPT

## 2023-11-09 PROCEDURE — 6360000002 HC RX W HCPCS: Performed by: ORTHOPAEDIC SURGERY

## 2023-11-09 PROCEDURE — 96360 HYDRATION IV INFUSION INIT: CPT

## 2023-11-09 PROCEDURE — 97530 THERAPEUTIC ACTIVITIES: CPT

## 2023-11-09 PROCEDURE — G0378 HOSPITAL OBSERVATION PER HR: HCPCS

## 2023-11-09 PROCEDURE — 2580000003 HC RX 258: Performed by: ORTHOPAEDIC SURGERY

## 2023-11-09 PROCEDURE — 97116 GAIT TRAINING THERAPY: CPT

## 2023-11-09 PROCEDURE — 6370000000 HC RX 637 (ALT 250 FOR IP): Performed by: ORTHOPAEDIC SURGERY

## 2023-11-09 PROCEDURE — 97535 SELF CARE MNGMENT TRAINING: CPT

## 2023-11-09 PROCEDURE — 97166 OT EVAL MOD COMPLEX 45 MIN: CPT

## 2023-11-09 RX ADMIN — APIXABAN 2.5 MG: 2.5 TABLET, FILM COATED ORAL at 08:29

## 2023-11-09 RX ADMIN — ACETAMINOPHEN 650 MG: 325 TABLET ORAL at 06:21

## 2023-11-09 RX ADMIN — ROSUVASTATIN CALCIUM 5 MG: 10 TABLET, FILM COATED ORAL at 08:29

## 2023-11-09 RX ADMIN — PREGABALIN 75 MG: 75 CAPSULE ORAL at 08:29

## 2023-11-09 RX ADMIN — OXYCODONE HYDROCHLORIDE 10 MG: 10 TABLET ORAL at 08:29

## 2023-11-09 RX ADMIN — SODIUM CHLORIDE 3000 MG: 900 INJECTION INTRAVENOUS at 04:31

## 2023-11-09 RX ADMIN — SODIUM CHLORIDE, PRESERVATIVE FREE 10 ML: 5 INJECTION INTRAVENOUS at 08:32

## 2023-11-09 RX ADMIN — ACETAMINOPHEN 650 MG: 325 TABLET ORAL at 00:46

## 2023-11-09 ASSESSMENT — PAIN DESCRIPTION - PAIN TYPE: TYPE: SURGICAL PAIN

## 2023-11-09 ASSESSMENT — PAIN DESCRIPTION - ONSET: ONSET: ON-GOING

## 2023-11-09 ASSESSMENT — PAIN DESCRIPTION - LOCATION
LOCATION: HIP
LOCATION: HIP

## 2023-11-09 ASSESSMENT — PAIN SCALES - GENERAL
PAINLEVEL_OUTOF10: 4
PAINLEVEL_OUTOF10: 7

## 2023-11-09 ASSESSMENT — PAIN DESCRIPTION - FREQUENCY: FREQUENCY: CONTINUOUS

## 2023-11-09 ASSESSMENT — PAIN SCALES - WONG BAKER: WONGBAKER_NUMERICALRESPONSE: 2

## 2023-11-09 ASSESSMENT — PAIN DESCRIPTION - ORIENTATION
ORIENTATION: RIGHT
ORIENTATION: RIGHT

## 2023-11-09 ASSESSMENT — PAIN DESCRIPTION - DESCRIPTORS: DESCRIPTORS: SHARP

## 2023-11-09 NOTE — CARE COORDINATION
[facility]    DC order noted, all docs needed have been faxed to [facility] for home care services.     Home care to see patient within 24-48 hrs    Zane Engle RN, BSN CTN  [facility] [phone]
DISCHARGE SUMMARY     DATE OF DISCHARGE: 11/9/2023    DISCHARGE DESTINATION: home      HOME CARE AGENCY:            Discharging to Facility/ Agency   Name:  [facility] care    Address: [place] [address]  Phone: [phone]  Fax: [phone]     DME ORDERED: none    TRANSPORTATION:   family    COMMENTS: Patient plans to return home with support of family and Doreen De Anda.     Electronically signed by Erasmo Strange, VERONICA, LISW, Case Management on 11/9/2023 at 10:09 AM  [facility] [id number]
to RETURNING to current housing: none  Potential Assistance needed at discharge: (P) Home Care            Potential DME:  none  Patient expects to discharge to: (P) [facility] for transportation at discharge:  car    Financial    Payor: Syed Woodard / Plan: Michael Quiroz / Product Type: *No Product type* /     Does insurance require precert for SNF: n/a    Potential assistance Purchasing Medications: (P) No  Meds-to-Beds request:        [address], [address] [phone] - F [phone]  [address] [address]  Phone: [phone] Fax: C/Fernie Santiago [address] [facility], [address] [place] [address] [phone] - f [phone]  [address] [facility] [address]  Phone: [phone] Fax: [facility] [address], [facility] [address] [address] [phone] Tasia Merritt [phone] [address] [facility] [address]  Phone: [phone] Fax: [phone]      Notes:    Factors facilitating achievement of predicted outcomes: Family support, Caregiver support, Motivated, Cooperative, and Pleasant    Barriers to discharge: none    Additional Case Management Notes:    spoke with patient and confirmed JET Class plan to return home with his mother  and the support of [address] [place] [address]. made referral to [facility]. She will follow and pull orders from [employer] at [place]         The Plan for Transition of Care is related to the following treatment goals of Arthritis of right hip [M16.11]  Primary osteoarthritis of right hip [M16.11]    The Patient and/or patient representative Rosario Call and his family were provided with a choice of provider and agrees with the discharge plan.  Freedom of choice list with basic dialogue that supports the patient's individualized plan of care/goals and shares the quality data associated with the providers was provided to: (P) Patient     The

## 2023-11-09 NOTE — DISCHARGE SUMMARY
Physician Discharge Summary     Patient ID:  Yuri Saunders  [id number]  17 y.o.  1965    Admit date: 11/8/2023    Discharge date and time: 11/9/2023  2:05 PM     Admitting Physician: Shaneka Zambrano MD     Discharge Physician: Carrie Hendrix    Admission Diagnoses: Arthritis of right hip [M16.11]  Primary osteoarthritis of right hip [M16.11]    Discharge Diagnoses: right hip OA    Admission Condition: good    Discharged Condition: good    Indication for Admission: Failed conservative treatment as outpatient for joint pain including PT and pain meds. This patient was then electively scheduled for total joint replacement surgery    Surgical procedure: right MEHDI    Consults: PT OT SS    This patient had no postoperative complications. They has PT and OT for ADL's . IV and PO pain med for pain control and was eventually DC in stable condition    Treatments: analgesia,  therapies: PT OT,  and surgery      Disposition: home    Patient Instructions:   [unfilled]  Activity: activity as tolerated  Diet: regular diet  Wound Care: keep wound clean and dry    Follow-up with CAIT Hammer in 2 weeks.     Signed:  CARI Gaxiola CNP  11/9/2023  2:20 PM

## 2023-11-09 NOTE — PLAN OF CARE
Problem: Skin/Tissue Integrity - Adult  Goal: Skin integrity remains intact  Outcome: Progressing  Flowsheets (Taken 11/8/2023 1957)  Skin Integrity Remains Intact:   Monitor for areas of redness and/or skin breakdown   Assess vascular access sites hourly  Goal: Incisions, wounds, or drain sites healing without S/S of infection  Outcome: Progressing  Flowsheets (Taken 11/8/2023 1957)  Incisions, Wounds, or Drain Sites Healing Without Sign and Symptoms of Infection: ADMISSION and DAILY: Assess and document risk factors for pressure ulcer development     Problem: Musculoskeletal - Adult  Goal: Return mobility to safest level of function  Outcome: Progressing  Flowsheets (Taken 11/8/2023 1957)  Return Mobility to Safest Level of Function:   Assess patient stability and activity tolerance for standing, transferring and ambulating with or without assistive devices   Assist with transfers and ambulation using safe patient handling equipment as needed   Ensure adequate protection for wounds/incisions during mobilization   Obtain physical therapy/occupational therapy consults as needed  Goal: Maintain proper alignment of affected body part  Outcome: Progressing  Flowsheets (Taken 11/8/2023 1957)  Maintain proper alignment of affected body part: Support and protect limb and body alignment per provider's orders  Goal: Return ADL status to a safe level of function  Outcome: Progressing  Flowsheets (Taken 11/8/2023 1957)  Return ADL Status to a Safe Level of Function:   Administer medication as ordered   Assess activities of daily living deficits and provide assistive devices as needed     Problem: Infection - Adult  Goal: Absence of infection at discharge  Outcome: Progressing  Flowsheets (Taken 11/8/2023 1957)  Absence of infection at discharge:   Assess and monitor for signs and symptoms of infection   Monitor lab/diagnostic results   Monitor all insertion sites i.e., indwelling lines, tubes and drains  Goal: Absence of
patient assessment

## 2023-11-13 ENCOUNTER — TELEPHONE (OUTPATIENT)
Dept: ORTHOPEDICS UNIT | Age: 58
End: 2023-11-13

## 2023-11-13 NOTE — TELEPHONE ENCOUNTER
Spoke with patient regarding post discharge from hospital.    Incision status: No drainage, odor, or redness noted per patient, bruising noted    Edema/Swelling/Teds: edema noted, wearing teds, elevating legs when resting    Pain level and status: 1-2/10 tolerable     Use of pain medications: yes ; Patient stated they are taking their pain medication oxycodone as prescribed. Use of ice therapy: yes     Blood thinner: eliquis ; Verified with patient that they are taking their anticoagulant as prescribed twice a day. Bowels: no constipation    Home Care Agency active: yes ; Claims already worked with home physical therapist last Thursday. States he has worked with occupational therapist as well. He is inquiring is his next physical therapy Wednesday can be moved to Tuesday as he is eager to complete his physical therapy sessions. I informed the patient I would contact [facility] to notify him of his inquiry. I called and left a voicemail with [facility] liaison. Outpatient therapy: n/a    Do you have all of your medications: vitamin D being refilled then patient reports his friend is picking up for him. Changes in medications: no    No other questions/concerns at this time. Encouraged patient to call Orthopedic Nurse Navigator Alma Sheppard or Orthopedic office if has any questions/concerns.       Follow up appointments:    Future Appointments   Date Time Provider [address] [place] [address]   11/20/2023  1:45 PM MD HARRISON Mas ORTHO [facility]   5/8/2024  3:30 PM Kwabena Heller DO [facility]     Electronically signed by Carie Saint, RN on 11/13/2023 at 3:50 PM

## 2023-11-20 ENCOUNTER — OFFICE VISIT (OUTPATIENT)
Dept: ORTHOPEDIC SURGERY | Age: 58
End: 2023-11-20

## 2023-11-20 VITALS — RESPIRATION RATE: 16 BRPM | BODY MASS INDEX: 46.68 KG/M2 | HEIGHT: 68 IN | WEIGHT: 308 LBS

## 2023-11-20 DIAGNOSIS — Z96.641 STATUS POST TOTAL REPLACEMENT OF RIGHT HIP: Primary | ICD-10-CM

## 2023-11-20 DIAGNOSIS — M16.11 PRIMARY OSTEOARTHRITIS OF RIGHT HIP: ICD-10-CM

## 2023-11-20 PROCEDURE — 99024 POSTOP FOLLOW-UP VISIT: CPT | Performed by: PHYSICIAN ASSISTANT

## 2023-11-20 RX ORDER — OXYCODONE HYDROCHLORIDE 5 MG/1
5-10 TABLET ORAL EVERY 6 HOURS PRN
Qty: 60 TABLET | Refills: 0 | Status: SHIPPED | OUTPATIENT
Start: 2023-11-20 | End: 2023-11-28

## 2023-11-20 RX ORDER — CYCLOBENZAPRINE HCL 10 MG
10 TABLET ORAL 3 TIMES DAILY PRN
Qty: 90 TABLET | Refills: 0 | Status: SHIPPED | OUTPATIENT
Start: 2023-11-20 | End: 2023-12-20

## 2023-11-20 NOTE — PROGRESS NOTES
This dictation was done with [employer] dictation and may contain mechanical errors related to translation. Resp. rate 16, height 1.727 m (5' 8\"), weight (!) 139.7 kg (308 lb). This is a very pleasant 58-year-old gentleman who had a right total hip replacement done on 11/8/2023. Looking at his incision we talked about dressing care. And the need to air this out. He is independently able to do a straight leg raise and extension has no neurologic deficits. He was sent for x-rays including an AP pelvis and a 2 view right hip     Three views of the right total Hip arthroplasty were done today including an AP pelvis and a 2 view hip. This reveals satisfactory alignment of the prosthesis with good sizing and fit. There is good version of the cup and no subsiding of the stem. . No signs of significant polyethylene wear or failure. No progressive radiolucencies,fractures, tumors or dislocations. Actually now both hips are replaced he feels more equivalent he has a good healing incision good x-rays. We will refill his pain medicine his muscle relaxer and write for him to go to outpatient physical therapy per the protocol.     My impression is stable healing right total hip replacement    I will follow-up with us in 4 weeks

## 2023-12-11 ENCOUNTER — OFFICE VISIT (OUTPATIENT)
Dept: ORTHOPEDIC SURGERY | Age: 58
End: 2023-12-11

## 2023-12-11 VITALS — HEIGHT: 68 IN | WEIGHT: 308 LBS | BODY MASS INDEX: 46.68 KG/M2

## 2023-12-11 DIAGNOSIS — Z96.641 STATUS POST TOTAL REPLACEMENT OF RIGHT HIP: Primary | ICD-10-CM

## 2023-12-11 PROCEDURE — 99024 POSTOP FOLLOW-UP VISIT: CPT | Performed by: ORTHOPAEDIC SURGERY

## 2024-05-08 ENCOUNTER — OFFICE VISIT (OUTPATIENT)
Dept: ENT CLINIC | Age: 59
End: 2024-05-08
Payer: COMMERCIAL

## 2024-05-08 VITALS
TEMPERATURE: 97.3 F | BODY MASS INDEX: 47.74 KG/M2 | SYSTOLIC BLOOD PRESSURE: 132 MMHG | WEIGHT: 315 LBS | RESPIRATION RATE: 16 BRPM | DIASTOLIC BLOOD PRESSURE: 87 MMHG | HEIGHT: 68 IN | HEART RATE: 78 BPM

## 2024-05-08 DIAGNOSIS — H61.21 IMPACTED CERUMEN OF RIGHT EAR: Primary | ICD-10-CM

## 2024-05-08 PROCEDURE — 69210 REMOVE IMPACTED EAR WAX UNI: CPT | Performed by: OTOLARYNGOLOGY

## 2024-05-08 RX ORDER — ALLOPURINOL 300 MG/1
300 TABLET ORAL DAILY
COMMUNITY

## 2024-05-08 NOTE — PROGRESS NOTES
[facility]  [address] , [address]  [place] [address]  P: [phone]  F: [phone]       Patient     Jm Francisco  1965    ChiefComplaint     Chief Complaint   Patient presents with    Cerumen Impaction     Both ears need cleaned       History of Present Illness     Jm Francisco is a pleasant 58 y.o. male here for routine ear cleaning    Past Medical History     Past Medical History:   Diagnosis Date    Arthritis     Chicken pox     Chronic GERD 07/19/2018    Congestion of upper airway     COVID-19 virus infection 11/22/2020    Dizziness     CORTES (generalized anxiety disorder)     Gout     Hip pain 02/2023    and groin    Hyperlipidemia     Obstructive sleep apnea syndrome 08/08/2016    doesn't use CPAP    Panic attacks     Pneumonia due to COVID-19 virus 12/04/2020    Rash     Vitamin D deficiency 08/03/2015       Past Surgical History     Past Surgical History:   Procedure Laterality Date    COLONOSCOPY  2017    Per pt' was nml.    HAND SURGERY      KNEE ARTHROSCOPY Right 09/14/2017    RIGHT KNEE ARTHROSCOPY WITH PARTIAL MEDIAL AND LATERAL MENISCECTOMY,CHONDROPLASTY,SYNOVECTOMY    KNEE SURGERY      left meniscus    MENISCECTOMY Left     TONSILLECTOMY      TOTAL HIP ARTHROPLASTY Left 3/1/2023    LEFT ANTERIOR TOTAL HIP REPLACEMENT WITH C-ARM performed by Adrian Hammer MD at [facility] OR    TOTAL HIP ARTHROPLASTY Right 11/8/2023    RIGHT ANTERIOR TOTAL HIP REPLACEMENT WITH C-ARM performed by Adrian Hammer MD at [facility] OR    UPPER GASTROINTESTINAL ENDOSCOPY N/A 9/10/2018    EGD BIOPSY performed by Carlo Bradley DO at [facility] ENDOSCOPY    WISDOM TOOTH EXTRACTION         Family History     Family History   Problem Relation Age of Onset    Elevated Lipids Mother     High Cholesterol Mother     No Known Problems Father     No Known Problems Brother     No Known Problems Brother     No Known Problems Sister     No Known Problems Maternal Grandmother     No Known Problems Maternal

## 2024-06-06 NOTE — TELEPHONE ENCOUNTER
Per Dr. Humaira Easley he will discuss with patient tomorrow.
Willis Echols [address] [facility], [phone]. Patient having surgery side left ankle pain and swelling. This is a new issue within the past 24 HRS. Has apt tomorrow at 1:00. Did mention didn't think blood clot but is a new issue within past 24 hrs.
yes

## 2025-08-18 ENCOUNTER — OFFICE VISIT (OUTPATIENT)
Dept: ORTHOPEDIC SURGERY | Age: 60
End: 2025-08-18

## 2025-08-18 VITALS — BODY MASS INDEX: 47.74 KG/M2 | WEIGHT: 315 LBS | HEIGHT: 68 IN

## 2025-08-18 DIAGNOSIS — M25.561 CHRONIC PAIN OF BOTH KNEES: Primary | ICD-10-CM

## 2025-08-18 DIAGNOSIS — M25.562 CHRONIC PAIN OF BOTH KNEES: Primary | ICD-10-CM

## 2025-08-18 DIAGNOSIS — G89.29 CHRONIC PAIN OF BOTH KNEES: Primary | ICD-10-CM

## 2025-08-18 DIAGNOSIS — M17.0 OSTEOARTHRITIS OF BOTH KNEES, UNSPECIFIED OSTEOARTHRITIS TYPE: ICD-10-CM

## 2025-08-18 RX ORDER — BUPIVACAINE HYDROCHLORIDE 2.5 MG/ML
2 INJECTION, SOLUTION INFILTRATION; PERINEURAL ONCE
Status: COMPLETED | OUTPATIENT
Start: 2025-08-18 | End: 2025-08-18

## 2025-08-18 RX ORDER — TRIAMCINOLONE ACETONIDE 40 MG/ML
40 INJECTION, SUSPENSION INTRA-ARTICULAR; INTRAMUSCULAR ONCE
Status: COMPLETED | OUTPATIENT
Start: 2025-08-18 | End: 2025-08-18

## 2025-08-18 RX ADMIN — BUPIVACAINE HYDROCHLORIDE 5 MG: 2.5 INJECTION, SOLUTION INFILTRATION; PERINEURAL at 15:32

## 2025-08-18 RX ADMIN — TRIAMCINOLONE ACETONIDE 40 MG: 40 INJECTION, SUSPENSION INTRA-ARTICULAR; INTRAMUSCULAR at 15:32

## 2025-08-18 RX ADMIN — TRIAMCINOLONE ACETONIDE 40 MG: 40 INJECTION, SUSPENSION INTRA-ARTICULAR; INTRAMUSCULAR at 15:33

## 2025-08-28 ENCOUNTER — TELEPHONE (OUTPATIENT)
Dept: ORTHOPEDIC SURGERY | Age: 60
End: 2025-08-28

## (undated) DEVICE — SYRINGE IRRIG 60ML SFT PLIABLE BLB EZ TO GRP 1 HND USE W/

## (undated) DEVICE — NEEDLE HYPO 18GA L1.5IN THN WALL PIVOTING SHLD BVL ORIENTED

## (undated) DEVICE — KIT POS FOAM HANA TBL

## (undated) DEVICE — GLOVE SURG SZ 8 L12IN FNGR THK79MIL GRN LTX FREE

## (undated) DEVICE — GLOVE ORTHO 8   MSG9480

## (undated) DEVICE — 3M™ MICROPORE™ TAPE, 1530-1: Brand: 3M™ MICROPORE™

## (undated) DEVICE — BIPOLAR SEALER 23-112-1 AQM 6.0: Brand: AQUAMANTYS ®

## (undated) DEVICE — BASIC SINGLE BASIN 1-LF: Brand: MEDLINE INDUSTRIES, INC.

## (undated) DEVICE — 1010 S-DRAPE TOWEL DRAPE 10/BX: Brand: STERI-DRAPE™

## (undated) DEVICE — SOLUTION IV IRRIG POUR BRL 0.9% SODIUM CHL 2F7124

## (undated) DEVICE — DRAPE MICSCP W54XL150IN W/ 4 BINOC GLS LENS LEICA

## (undated) DEVICE — FORCEPS BX L240CM DIA2.4MM L NDL RAD JAW 4 133340

## (undated) DEVICE — GLOVE SURG SZ 85 L12IN FNGR ORTHO 126MIL CRM LTX FREE

## (undated) DEVICE — ADHESIVE SKIN CLOSURE WND 8.661X1.5 IN 22 CM LIQUIBAND SECUR

## (undated) DEVICE — RETRACTOR SURG WIDE FLAT LO PROF LTWT PHOTONGUIDE

## (undated) DEVICE — SOLUTION PREP PAINT POV IOD FOR SKIN MUCOUS MEM

## (undated) DEVICE — 6619 2 PTNT ISO SYS INCISE AREA&LT;(&GT;&&LT;)&GT;P: Brand: STERI-DRAPE™ IOBAN™ 2

## (undated) DEVICE — TOTAL BASIC: Brand: MEDLINE INDUSTRIES, INC.

## (undated) DEVICE — SOLUTION IV 1000ML 0.9% SOD CHL FOR IRRIG PLAS CONT

## (undated) DEVICE — SUTURE STRATAFIX SPRL SZ 3-0 L12IN ABSRB UD FS-1 L30X30CM SXMP2B410

## (undated) DEVICE — SYRINGE 60ML BULB IRRIG ST LF

## (undated) DEVICE — SUTURE ABSORBABLE MONOFILAMENT 1 OS-8 36 CM 40 MM VIO PDS +

## (undated) DEVICE — SUTURE ABSORBABLE MONOFILAMENT 1-0 OS8 14 IN STRATAFIX SPRL SXPD2B202

## (undated) DEVICE — MAT FLR W32XL58IN

## (undated) DEVICE — SUTURE STRATAFIX SPRL SZ 2 0 L14IN ABSRB UD MH L36MM 1 2 CIR SXMP2B401

## (undated) DEVICE — HYPODERMIC SAFETY NEEDLE: Brand: MONOJECT

## (undated) DEVICE — C-ARM: Brand: UNBRANDED

## (undated) DEVICE — GLOVE ORANGE PI 8 1/2   MSG9085